# Patient Record
Sex: MALE | Race: WHITE | Employment: STUDENT | URBAN - METROPOLITAN AREA
[De-identification: names, ages, dates, MRNs, and addresses within clinical notes are randomized per-mention and may not be internally consistent; named-entity substitution may affect disease eponyms.]

---
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
## 2019-01-16 ENCOUNTER — OFFICE VISIT (OUTPATIENT)
Dept: OTHER | Facility: OUTPATIENT CENTER | Age: 19
End: 2019-01-16
Payer: COMMERCIAL

## 2019-01-16 DIAGNOSIS — F64.2 GENDER DYSPHORIA IN PEDIATRIC PATIENT: ICD-10-CM

## 2019-01-16 NOTE — PROGRESS NOTES
"[facility] -  Case Progress Note    1/16/19  Client Name: Remy Stephenson she/her/hers  YOB: 2000  MRN:  [id number]  Treating Provider: Cornelia Avendano PsyD  Type of Session: individual  Present in Session: client  Number of Minutes:  55    Current Symptoms/Status:  Client describes incongruence between her birth assigned sex and her experienced gender. Client describes self as a \"gender fluid female\"; noting distress in reaction to being treated as and referred to as male. Client describes body dysphoria regarding some primary and secondary sex characteristics. Client describes preference for flexibility in her gender expression.    Client's parents are legal guardians. Client is a neurodiverse individual (ASD, ADHD).    Progress Toward Treatment Goals:   Attending second session with provider.    Intervention and Response:  Client was administered the Body Image Scale-Gender Spectrum, [place] Gender Dysphoria Scale-Gender Spectrum, and the Gender Queer Identity Scale in the second session. Client s responses on the Body Image Scale-Gender Spectrum indicated that she is most distressed by her facial/body hair, weight, penis, and testicles. Client expressed dissatisfaction with her scrotum, lack of breasts, lack of vagina, lack of clitoris, arms, weight, waist, voice, figure, and lack of ovaries/uterus; noting desire for feminine secondary sex characteristics. However, it is unclear as to what degree client could comprehend the concepts discussed. For example, when responding to what she would change about the absence of ovaries/uterus, client responded \"bottom surgery.\" Also, when asked about what bothers her about her muscles with responded with \"my penis.\" Therefore, assessment should continue to explore the development of client's gender dysphoria and the acquisition of her gender transition narrative.     On the [place] Gender Dysphoria Scale-Gender Spectrum, the " "client's responses indicated complete agreement with  every time someone treats me like my assigned sex I feel hurt,   it feels good to live as my affirmed gender,   it is uncomfortable to be sexual in my assigned sex,   a life in my affirmed gender is more attractive for me than a life in my assigned sex,\"  I feel unhappy behaving in my assigned sex,   I always want to be treated as my affirmed gender,   I wish I had been born in my affirmed gender,   I feel unhappy because I have the physical characteristics of my assigned sex,  the bodily functions of my assigned sex are distressing,   my life would be meaningless if I would have to live in my assigned sex,   I feel hopeless if I have to stay in my assigned sex,   I feel unhappy when someone misgenders me,  and  I hate my assigned sex,  and  feel uncomfortable behaving like my assigned sex.       Client struggled to provide responses, often responding with \"I don't know.\" Client required long pauses prior to responding and required repeated reframing from provider in order to understand questions. Client struggled with expressive and respective language processing.   Assignment:  none    Interactive Complexity:  1. The need to manage maladaptive communication (difficulties with receptive and expressive language processing) among participants that complicates delivery of care.    Diagnosis:  302.6 Gender Dysphoria, unspecified  ASD, ADHD, Bipolar II, anxiety (by history)    Plan / Need for Future Services:  Return for therapy in 2 weeks.      Cornelia Avendano PsyD  "

## 2019-01-24 ENCOUNTER — OFFICE VISIT (OUTPATIENT)
Dept: FAMILY MEDICINE | Facility: CLINIC | Age: 19
End: 2019-01-24
Payer: COMMERCIAL

## 2019-01-24 VITALS
DIASTOLIC BLOOD PRESSURE: 75 MMHG | BODY MASS INDEX: 28.58 KG/M2 | OXYGEN SATURATION: 98 % | SYSTOLIC BLOOD PRESSURE: 118 MMHG | TEMPERATURE: 97.6 F | HEIGHT: 72 IN | WEIGHT: 211 LBS | HEART RATE: 79 BPM

## 2019-01-24 DIAGNOSIS — Z00.00 ANNUAL PHYSICAL EXAM: Primary | ICD-10-CM

## 2019-01-24 DIAGNOSIS — R50.9 FEVER, UNSPECIFIED FEVER CAUSE: ICD-10-CM

## 2019-01-24 ASSESSMENT — PATIENT HEALTH QUESTIONNAIRE - PHQ9: 5. POOR APPETITE OR OVEREATING: MORE THAN HALF THE DAYS

## 2019-01-24 ASSESSMENT — ANXIETY QUESTIONNAIRES
1. FEELING NERVOUS, ANXIOUS, OR ON EDGE: NEARLY EVERY DAY
3. WORRYING TOO MUCH ABOUT DIFFERENT THINGS: MORE THAN HALF THE DAYS
2. NOT BEING ABLE TO STOP OR CONTROL WORRYING: MORE THAN HALF THE DAYS
5. BEING SO RESTLESS THAT IT IS HARD TO SIT STILL: MORE THAN HALF THE DAYS
6. BECOMING EASILY ANNOYED OR IRRITABLE: MORE THAN HALF THE DAYS
IF YOU CHECKED OFF ANY PROBLEMS ON THIS QUESTIONNAIRE, HOW DIFFICULT HAVE THESE PROBLEMS MADE IT FOR YOU TO DO YOUR WORK, TAKE CARE OF THINGS AT HOME, OR GET ALONG WITH OTHER PEOPLE: VERY DIFFICULT
7. FEELING AFRAID AS IF SOMETHING AWFUL MIGHT HAPPEN: MORE THAN HALF THE DAYS
GAD7 TOTAL SCORE: 15

## 2019-01-24 ASSESSMENT — MIFFLIN-ST. JEOR: SCORE: 2005.22

## 2019-01-24 NOTE — PROGRESS NOTES
"Remy \"Seema\" Jewel is here for a general check up, accompanied by parents.  She is not fasting. She is up to date on eye exams and dental visits, but reports she may need new glasses. Wears seat belt.--yes Wears bike helmet--yes.  Concerns today:    HCM  Jacques \"Yeison" is a 17 yo who identifies as female, here for a check up. They prefer she/her pronouns. She is up to date on immunizations. She eats a fairly healthy diet. She typically has 3 meals per day. She believes she is intolerant of gluten and dairy. She had a lipid panel on 6/8/17, normal.She also had a basic metabolic panel, which was normal aside from a slightly elevated calcium level (10.5) and low anion gap (6). Mom reports she may have had kidney stones in the past.    Camp forms  Seema has brought forms to fill out for camp. She has gone to this camp several times in the past. 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 She will be living in staff housing with 1-2 other roommates. The camp starts in June, and lasts for 8 weeks.    Depression/anxiety  She was admitted to the hospital in May 2018 for her depression. She reports today that her mental health is doing better since then. Her primary concern is her anxiety. [facility] She sees her childhood psychiatrist, Dr. Rose, as needed. She will try to transition to an adult psychiatrist around age 21-22.    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 Mom has a list of potential drug interactions. This diagnosis was made by her psychiatrist.    Cough/sore throat  Seema reports a cough and mild sore throat today.    Fever  Seema has had a " history of cyclic fevers, up to 101. This occurs 1-2 times a year. She first noted this in February of 2018. Fever was associated with a mild sore throat and headache. No fatigue. She had CBC, hepatic panel, EBV antibody, and CMV antibody, all of which were normal. Her parents report that she experiences similar episodes 1-2 times/year.    She recently developed a fever for an unknown etiology that lasted for about 1 week. She did not feel fatigued, but her appetite was slightly decreased.  She took 4 days off of work, then returned to her normal activities. She was taking Tylenol to regulate her temperature. Family would like to see ID doctor to determine cause. She feels well today.     GERD  She experiences intermittent heart burn, which she treats with ranitidine. She occasionally has trouble swallowing pills, but no trouble with food. This works well to resolve the symptoms. She also reports intermittent diarrhea and constipation.      Health Maintenance   Topic Date Due     HIV SCREEN (SYSTEM ASSIGNED)  02/15/2017     INFLUENZA VACCINE (1) 09/01/2018     PHQ-2 Q1 YR  04/19/2019     DTAP/TDAP/TD IMMUNIZATION (7 - Td) 04/12/2021     ZOSTER IMMUNIZATION (1 of 2) 02/15/2049     IPV IMMUNIZATION  Completed     HIB IMMUNIZATION  Completed     HPV IMMUNIZATION  Completed     MENINGITIS IMMUNIZATION  Completed     VARICELLA IMMUNIZATION  Completed         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      Current Outpatient Medications   Medication Sig Dispense Refill     metFORMIN (GLUCOPHAGE) 500 MG tablet Take 500 mg by mouth 2 times daily (with meals)       acetylcysteine (N-ACETYL CYSTEINE) 600 MG CAPS capsule Take one po bid 60 capsule 3     clonazePAM (KLONOPIN) 1 MG tablet 0.5 mg 2 times daily as needed Take 0.5 -1 tablet in the morning, and may repeat one additional dose q day prn 90 tablet 1     guanFACINE HCl (INTUNIV) 3 MG TB24 24 hr tablet 4 mg Take one daily for ADHD 30 tablet 1     hydrOXYzine (ATARAX) 25 MG tablet Take one po q 8 hr for respiratory symptoms prn (Patient not taking: Reported on 1/24/2019) 30 tablet 3     lithium (ESKALITH) 450 MG CR tablet Take a single 450mg tablet po q hs along with a single 300mg tablet at hs 90 tablet 3     lithium (ESKALITH/LITHOBID) 300 MG CR tablet 750 mg 2 times daily Take 2 po q am and one po q hs along with 450mg dose 90 tablet 1     loratadine (CLARITIN) 10 MG capsule Take 10 mg by mouth daily 30 capsule 3     lurasidone (LATUDA) 80 MG TABS tablet Take 160 mg by mouth       melatonin 3 MG tablet Take 6-9 mg by mouth       mirtazapine (REMERON) 7.5 MG TABS tablet Take 7.5mg dose  "at bedtime (Patient not taking: Reported on 6/11/2018) 30 tablet 3     polyethylene glycol (MIRALAX/GLYCOLAX) powder None Entered       QUEtiapine (SEROQUEL) 400 MG tablet Take 1 tablet (400 mg) by mouth At Bedtime Take 2 (200mg) po q hs 30 tablet 3     QUEtiapine (SEROQUEL) 50 MG tablet 50 mg as needed Take 1-2 po TID prn 120 tablet 1     sodium chloride (OCEAN) 0.65 % nasal spray Spray 2 sprays into both nostrils daily as needed for congestion 30 mL 0     Allergies   Allergen Reactions     Gluten Meal Other (See Comments)     No Known Allergies          ROS  CONSTITUTIONAL:NEGATIVE for chills or change in weight. POSITIVE for intermittent fever, unknown cause.  INTEGUMENTARY/SKIN: NEGATIVE for worrisome rashes, moles or lesions, mild acne  EYES: NEGATIVE for vision changes or irritation, glasses  ENT/MOUTH: NEGATIVE for ear or mouth problems. POSITIVE for mild sore throat.  RESP:NEGATIVE for significant cough or SOB.  CV: NEGATIVE for chest pain, palpitations, FISCHER, orthopnea, PND  or peripheral edema  GI: NEGATIVE for nausea, abdominal pain, heartburn, or change in bowel habits. POSITIVE for heartburn and intermittent bowel changes.  :NEGATIVE for frequency, dysuria, or hematuria  MUSCULOSKELETAL:NEGATIVE for significant arthralgias or myalgia. Some muscle pain after exercise.  NEURO: NEGATIVE for weakness, dizziness or paresthesias  ENDOCRINE: NEGATIVE for polyuria/dipsia,  temperature intolerance, skin/hair changes  HEME/ALLERGY/IMMUNE: NEGATIVE for bleeding problems  PSYCHIATRIC: NEGATIVE for changes in mood or affect, followed by psychiatrist and counselor    PHQ9 score = 16  LOLLY 7 score = 15    EXAM  /75   Pulse 79   Temp 97.6  F (36.4  C) (Oral)   Ht 1.821 m (5' 11.69\")   Wt 95.7 kg (211 lb)   SpO2 98%   BMI 28.86 kg/m    GENERAL APPEARANCE: Alert, pleasant, NAD, overweight  EYES: PERRL, EOMI, conjunctiva clear  HENT: TM normal bilaterally. Nose and mouth without lesions  NECK: no adenopathy, " thyroid normal to palpation  RESP: lungs clear to auscultation bilaterally  CV: regular rate and rhythm, normal S1 S2, no murmur, no carotid bruits  ABDOMEN: soft, without HSM or masses. Bowel sounds normal. Mild tenderness at midepigastrium  MS: extremities normal- no gross deformities noted, no tender, hot or swollen joints.  SKIN: no suspicious lesions or rashes, mild facial acne  NEURO: Normal strength and tone, sensory exam grossly normal, DTR normoreflexive in upper and lower extremities  PSYCH: mentation appears normal. and affect normal/bright.  EXT: no peripheral edema, pedal pulses palpable    Assessment:  (Z00.00) Annual physical exam  (primary encounter diagnosis)  Comment: 17 yo patient in good health, vaccines are up to date  Plan: Anticipatory guidance given today regarding diet, exercise and calcium intake, safety. I completed form for participation in camp.     (R50.9) Fever, unspecified fever cause  Comment: historically has episodic fevers of unknown origin lasting > 1 week  Plan: INFECTIOUS DISEASE REFERRAL        Refer to ID for evaluation    Lana Faustin MD  Internal Medicine    I, Sophie Riggins, am serving as a scribe to document services personally performed by Dr. Lana Faustin, based on data collection and the provider's statements to me. Dr. Faustin has reviewed, edited, and approved the above note.

## 2019-01-24 NOTE — NURSING NOTE
"18 year old  Chief Complaint   Patient presents with     Physical     no concerns per mom       Blood pressure 118/75, pulse 79, temperature 97.6  F (36.4  C), temperature source Oral, height 1.821 m (5' 11.69\"), weight 95.7 kg (211 lb), SpO2 98 %. Body mass index is 28.86 kg/m .  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     BP Readings from Last 3 Encounters:   01/24/19 118/75 (30 %/ 63 %)*   06/11/18 104/70 (3 %/ 37 %)*   04/19/18 126/69 (62 %/ 35 %)*     *BP percentiles are based on the August 2017 AAP Clinical Practice Guideline for boys         Current Outpatient Medications   Medication     metFORMIN (GLUCOPHAGE) 500 MG tablet     acetylcysteine (N-ACETYL CYSTEINE) 600 MG CAPS capsule     clonazePAM (KLONOPIN) 1 MG tablet     guanFACINE HCl (INTUNIV) 3 MG TB24 24 hr tablet     hydrOXYzine (ATARAX) 25 MG tablet     lithium (ESKALITH) 450 MG CR tablet     lithium (ESKALITH/LITHOBID) 300 MG CR tablet     loratadine (CLARITIN) 10 MG capsule     lurasidone (LATUDA) 80 MG TABS tablet     melatonin 3 MG tablet     mirtazapine (REMERON) 7.5 MG TABS tablet     polyethylene glycol " (MIRALAX/GLYCOLAX) powder     QUEtiapine (SEROQUEL) 400 MG tablet     QUEtiapine (SEROQUEL) 50 MG tablet     sodium chloride (OCEAN) 0.65 % nasal spray     No current facility-administered medications for this visit.        Social History     Tobacco Use     Smoking status: Never Smoker     Smokeless tobacco: Never Used   Substance Use Topics     Alcohol use: No     Drug use: No       Health Maintenance Due   Topic Date Due     HIV SCREEN (SYSTEM ASSIGNED)  02/15/2017     INFLUENZA VACCINE (1) 09/01/2018       No results found for: PAP      January 24, 2019 1:08 PM

## 2019-01-25 ENCOUNTER — OFFICE VISIT (OUTPATIENT)
Dept: OTHER | Facility: OUTPATIENT CENTER | Age: 19
End: 2019-01-25
Payer: COMMERCIAL

## 2019-01-25 DIAGNOSIS — F64.2 GENDER DYSPHORIA IN PEDIATRIC PATIENT: Primary | ICD-10-CM

## 2019-01-25 NOTE — PROGRESS NOTES
"[facility] -  Case Progress Note    1/25/19  Client Name: Remy Stephenson she/her/hers  YOB: 2000  MRN:  [id number]  Treating Provider: Cornelia Avendano PsyD  Type of Session: individual  Present in Session: client  Number of Minutes:  55    Current Symptoms/Status:  Client describes incongruence between her birth assigned sex and her experienced gender. Client describes self as a \"gender fluid female\"; noting distress in reaction to being treated as and referred to as male. Client describes body dysphoria regarding some primary and secondary sex characteristics. Client describes preference for flexibility in her gender expression.    Client's parents are legal guardians. Client is a neurodiverse individual (ASD, ADHD).    Progress Toward Treatment Goals:   Attending second session with provider.    Intervention and Response:  Supportive and expressive approach to exploring and identifying goals for treatment and treatment planning, as well as discussing diagnosis and subsequent recommendations. See scanned and attached treatment plan. Client was actively engaged in the treatment planning process as noted in their identified goals for treatment. Took adequate time providing explanations in ways in which client could best process. Client asked many clarifying questions in order to fully understand recommendations. Client reported agreement with diagnosis and subsequent recommendations for treatment.    Planned to meet with parents separately to discuss diagnostic impressions and recommendations at next session.    Assignment:  none    Interactive Complexity:  1. The need to manage maladaptive communication (difficulties with receptive and expressive language processing) among participants that complicates delivery of care.    Diagnosis:  302.6 Gender Dysphoria, unspecified  ASD, ADHD, Bipolar I, anxiety (by history)    Plan / Need for Future Services:  Return for therapy " in 2 weeks.      Cornelia Avendano PsyD

## 2019-01-29 ENCOUNTER — TELEPHONE (OUTPATIENT)
Dept: FAMILY MEDICINE | Facility: CLINIC | Age: 19
End: 2019-01-29

## 2019-01-29 NOTE — TELEPHONE ENCOUNTER
Father states patient gets fevers once in a while that have no known cause. The patient currently is asymptomatic. He wants to know what to do if she should get a fever. The patient's temperature ranges in the 99.5-102 F using forehead thermometer. Advised father to get an oral thermometer, and can ask pharmacist which brand to purchase. If temperature is elevated >100.5 and/or patient has pain, OK to give acetaminophen, and the intent of the fever reducer is to bring temp down 1-2 degrees.  Increase fluids during illness. All questions answered, and father verbalized understanding, and agrees with the plan.   Vivien Silva RN  01/29/19  12:06 PM

## 2019-01-29 NOTE — TELEPHONE ENCOUNTER
----- Message from Drake Chong sent at 1/29/2019 11:20 AM CST -----  Regarding: call [facility] requested  Contact: [phone]  Msg for :    Pt father looking for guidance on what to do while waiting for infectious disease appt.    VOICEMAIL OK    Thanks,      Drake [facility]

## 2019-02-12 ENCOUNTER — OFFICE VISIT (OUTPATIENT)
Dept: OTHER | Facility: OUTPATIENT CENTER | Age: 19
End: 2019-02-12
Payer: COMMERCIAL

## 2019-02-12 DIAGNOSIS — F84.0 AUTISM SPECTRUM DISORDER: ICD-10-CM

## 2019-02-12 DIAGNOSIS — F64.0 GENDER DYSPHORIA IN ADOLESCENT AND ADULT: Primary | ICD-10-CM

## 2019-02-12 NOTE — PROGRESS NOTES
"[facility] -  Case Progress Note    2/12/19  Client Name: Remy Stephenson she/her/hers  YOB: 2000  MRN:  [id number]  Treating Provider: Cornelia Avendano PsyD  Type of Session: family  Present in Session: client, client's father, client's mother  Number of Minutes:  55    Current Symptoms/Status:  Client describes incongruence between her birth assigned sex and her experienced gender. Client describes self as a \"gender fluid female\"; noting distress in reaction to being treated as and referred to as male. Client describes body dysphoria regarding some primary and secondary sex characteristics. Client describes preference for flexibility in her gender expression.    Client's parents are legal guardians. Client is a neurodiverse individual (ASD, ADHD).    Progress Toward Treatment Goals:   Attending family session with parents and provider.    Intervention and Response:  Supportive and expressive approach to exploring and identifying goals for treatment and treatment planning, as well as discussing diagnosis and subsequent recommendations. See scanned and attached treatment plan. Client and parents were actively engaged in the treatment planning process as noted in their identified goals for treatment. Took adequate time providing explanations in ways in which client could best process. Parents asked clarifying questions in order to fully understand recommendations and best treatment plan. Client and parents reported agreement with diagnosis and subsequent recommendations for treatment.      Assignment:  none    Interactive Complexity:  1. The need to manage maladaptive communication (difficulties with receptive and expressive language processing) among participants that complicates delivery of care.    Diagnosis:  302.85 Gender Dysphoria  ASD, ADHD, Bipolar I, anxiety (by history)    Plan / Need for Future Services:  Return for therapy in 2 weeks.      Cornelia BECERRA" Juan Alberto, SegundoyD

## 2019-02-20 ENCOUNTER — OFFICE VISIT (OUTPATIENT)
Dept: INFECTIOUS DISEASES | Facility: CLINIC | Age: 19
End: 2019-02-20
Attending: INTERNAL MEDICINE
Payer: COMMERCIAL

## 2019-02-20 ENCOUNTER — APPOINTMENT (OUTPATIENT)
Dept: LAB | Facility: CLINIC | Age: 19
End: 2019-02-20
Payer: COMMERCIAL

## 2019-02-20 VITALS
DIASTOLIC BLOOD PRESSURE: 84 MMHG | BODY MASS INDEX: 27.11 KG/M2 | OXYGEN SATURATION: 96 % | HEIGHT: 74 IN | TEMPERATURE: 97.6 F | WEIGHT: 211.2 LBS | SYSTOLIC BLOOD PRESSURE: 125 MMHG | HEART RATE: 78 BPM

## 2019-02-20 DIAGNOSIS — A68.9 RECURRENT FEVER: Primary | ICD-10-CM

## 2019-02-20 DIAGNOSIS — R50.9 FEVER, UNSPECIFIED FEVER CAUSE: ICD-10-CM

## 2019-02-20 LAB
ALBUMIN SERPL-MCNC: 4.3 G/DL (ref 3.4–5)
ALBUMIN UR-MCNC: NEGATIVE MG/DL
ALP SERPL-CCNC: 139 U/L (ref 40–150)
ALT SERPL W P-5'-P-CCNC: 29 U/L (ref 0–70)
ANION GAP SERPL CALCULATED.3IONS-SCNC: 4 MMOL/L (ref 3–14)
APPEARANCE UR: ABNORMAL
AST SERPL W P-5'-P-CCNC: 26 U/L (ref 0–45)
BASOPHILS # BLD AUTO: 0 10E9/L (ref 0–0.2)
BASOPHILS NFR BLD AUTO: 0.2 %
BILIRUB SERPL-MCNC: 0.4 MG/DL (ref 0.2–1.3)
BILIRUB UR QL STRIP: NEGATIVE
BUN SERPL-MCNC: 12 MG/DL (ref 7–30)
CALCIUM SERPL-MCNC: 9.9 MG/DL (ref 8.5–10.1)
CHLORIDE SERPL-SCNC: 105 MMOL/L (ref 94–109)
CO2 SERPL-SCNC: 27 MMOL/L (ref 20–32)
COLOR UR AUTO: YELLOW
CREAT SERPL-MCNC: 0.94 MG/DL (ref 0.66–1.25)
CRP SERPL-MCNC: 16 MG/L (ref 0–8)
DIFFERENTIAL METHOD BLD: NORMAL
EOSINOPHIL # BLD AUTO: 0.2 10E9/L (ref 0–0.7)
EOSINOPHIL NFR BLD AUTO: 2.3 %
ERYTHROCYTE [DISTWIDTH] IN BLOOD BY AUTOMATED COUNT: 12.6 % (ref 10–15)
ERYTHROCYTE [SEDIMENTATION RATE] IN BLOOD BY WESTERGREN METHOD: 10 MM/H (ref 0–15)
GFR SERPL CREATININE-BSD FRML MDRD: >90 ML/MIN/{1.73_M2}
GLUCOSE SERPL-MCNC: 84 MG/DL (ref 70–99)
GLUCOSE UR STRIP-MCNC: NEGATIVE MG/DL
HCT VFR BLD AUTO: 42.6 % (ref 40–53)
HGB BLD-MCNC: 13.9 G/DL (ref 13.3–17.7)
HGB UR QL STRIP: NEGATIVE
IMM GRANULOCYTES # BLD: 0.1 10E9/L (ref 0–0.4)
IMM GRANULOCYTES NFR BLD: 0.5 %
KETONES UR STRIP-MCNC: NEGATIVE MG/DL
LEUKOCYTE ESTERASE UR QL STRIP: NEGATIVE
LYMPHOCYTES # BLD AUTO: 1.6 10E9/L (ref 0.8–5.3)
LYMPHOCYTES NFR BLD AUTO: 16.5 %
MCH RBC QN AUTO: 27.7 PG (ref 26.5–33)
MCHC RBC AUTO-ENTMCNC: 32.6 G/DL (ref 31.5–36.5)
MCV RBC AUTO: 85 FL (ref 78–100)
MONOCYTES # BLD AUTO: 0.5 10E9/L (ref 0–1.3)
MONOCYTES NFR BLD AUTO: 5 %
MUCOUS THREADS #/AREA URNS LPF: PRESENT /LPF
NEUTROPHILS # BLD AUTO: 7.1 10E9/L (ref 1.6–8.3)
NEUTROPHILS NFR BLD AUTO: 75.5 %
NITRATE UR QL: NEGATIVE
NRBC # BLD AUTO: 0 10*3/UL
NRBC BLD AUTO-RTO: 0 /100
PH UR STRIP: 6 PH (ref 5–7)
PLATELET # BLD AUTO: 356 10E9/L (ref 150–450)
POTASSIUM SERPL-SCNC: 4 MMOL/L (ref 3.4–5.3)
PROT SERPL-MCNC: 8.1 G/DL (ref 6.8–8.8)
RBC # BLD AUTO: 5.02 10E12/L (ref 4.4–5.9)
RBC #/AREA URNS AUTO: 0 /HPF (ref 0–2)
SODIUM SERPL-SCNC: 136 MMOL/L (ref 133–144)
SOURCE: ABNORMAL
SP GR UR STRIP: 1.01 (ref 1–1.03)
TRANS CELLS #/AREA URNS HPF: <1 /HPF
UROBILINOGEN UR STRIP-MCNC: 0 MG/DL (ref 0–2)
WBC # BLD AUTO: 9.5 10E9/L (ref 4–11)
WBC #/AREA URNS AUTO: <1 /HPF (ref 0–5)

## 2019-02-20 PROCEDURE — 81001 URINALYSIS AUTO W/SCOPE: CPT | Performed by: STUDENT IN AN ORGANIZED HEALTH CARE EDUCATION/TRAINING PROGRAM

## 2019-02-20 PROCEDURE — 36415 COLL VENOUS BLD VENIPUNCTURE: CPT | Performed by: STUDENT IN AN ORGANIZED HEALTH CARE EDUCATION/TRAINING PROGRAM

## 2019-02-20 PROCEDURE — G0463 HOSPITAL OUTPT CLINIC VISIT: HCPCS | Mod: ZF

## 2019-02-20 PROCEDURE — 85025 COMPLETE CBC W/AUTO DIFF WBC: CPT | Performed by: STUDENT IN AN ORGANIZED HEALTH CARE EDUCATION/TRAINING PROGRAM

## 2019-02-20 PROCEDURE — 86480 TB TEST CELL IMMUN MEASURE: CPT | Performed by: STUDENT IN AN ORGANIZED HEALTH CARE EDUCATION/TRAINING PROGRAM

## 2019-02-20 PROCEDURE — 86308 HETEROPHILE ANTIBODY SCREEN: CPT | Performed by: STUDENT IN AN ORGANIZED HEALTH CARE EDUCATION/TRAINING PROGRAM

## 2019-02-20 PROCEDURE — 80053 COMPREHEN METABOLIC PANEL: CPT | Performed by: STUDENT IN AN ORGANIZED HEALTH CARE EDUCATION/TRAINING PROGRAM

## 2019-02-20 PROCEDURE — 87040 BLOOD CULTURE FOR BACTERIA: CPT | Performed by: STUDENT IN AN ORGANIZED HEALTH CARE EDUCATION/TRAINING PROGRAM

## 2019-02-20 PROCEDURE — 86038 ANTINUCLEAR ANTIBODIES: CPT | Performed by: STUDENT IN AN ORGANIZED HEALTH CARE EDUCATION/TRAINING PROGRAM

## 2019-02-20 PROCEDURE — 86618 LYME DISEASE ANTIBODY: CPT | Performed by: STUDENT IN AN ORGANIZED HEALTH CARE EDUCATION/TRAINING PROGRAM

## 2019-02-20 PROCEDURE — 87491 CHLMYD TRACH DNA AMP PROBE: CPT | Performed by: STUDENT IN AN ORGANIZED HEALTH CARE EDUCATION/TRAINING PROGRAM

## 2019-02-20 PROCEDURE — 86140 C-REACTIVE PROTEIN: CPT | Performed by: STUDENT IN AN ORGANIZED HEALTH CARE EDUCATION/TRAINING PROGRAM

## 2019-02-20 PROCEDURE — 0064U ANTB TP TOTAL&RPR IA QUAL: CPT | Performed by: STUDENT IN AN ORGANIZED HEALTH CARE EDUCATION/TRAINING PROGRAM

## 2019-02-20 PROCEDURE — 87591 N.GONORRHOEAE DNA AMP PROB: CPT | Performed by: STUDENT IN AN ORGANIZED HEALTH CARE EDUCATION/TRAINING PROGRAM

## 2019-02-20 PROCEDURE — 87389 HIV-1 AG W/HIV-1&-2 AB AG IA: CPT | Performed by: STUDENT IN AN ORGANIZED HEALTH CARE EDUCATION/TRAINING PROGRAM

## 2019-02-20 PROCEDURE — 85652 RBC SED RATE AUTOMATED: CPT | Performed by: STUDENT IN AN ORGANIZED HEALTH CARE EDUCATION/TRAINING PROGRAM

## 2019-02-20 ASSESSMENT — PAIN SCALES - GENERAL: PAINLEVEL: MODERATE PAIN (5)

## 2019-02-20 ASSESSMENT — MIFFLIN-ST. JEOR: SCORE: 2029.81

## 2019-02-20 NOTE — PROGRESS NOTES
[facility]  Infectious Disease Clinic Note:  New Patient     Patient:  Remy Holloway, Date of birth 2000, Medical record number [id number]  Date of Visit:  02/20/2019  Consult requested by   for evaluation of .         Assessment and Recommendations:   Recommendations:    Assessment:               History of the Infectious Disease lllness:       Review of Systems:  CONSTITUTIONAL:  No fevers or chills. No night sweats.  EYES: negative for icterus or acute vision changes.   ENT:  negative for hearing loss, tinnitus or sore throat  RESPIRATORY:  negative for cough, sputum, dyspnea  CARDIOVASCULAR:  negative for chest pain, heart palpitations  GASTROINTESTINAL:  negative for nausea, vomiting, diarrhea or constipation  GENITOURINARY:  negative for dysuria or hematuria.  HEME:  No easy bruising or bleeding  INTEGUMENT:  negative for rash or pruritus  NEURO:  Negative for headache or tremor.    Past Medical History:   Diagnosis Date     Amblyopia, unspecified      Esotropia, unspecified      Lack of normal physiological development, unspecified     Normal chromosomes, nl MRI, neg fragile X     Redundant prepuce and phimosis     Penile coronal adhesions-repaired     Umbilical hernia without mention of obstruction or gangrene     repaired     Unspecified otitis media     Recurrent       Past Surgical History:   Procedure Laterality Date     DENTAL SURGERY      wisdom teeth removed     HC CREATE EARDRUM OPENING,GEN ANESTH  8/2000    P.E. Tubes - removed 2002     HC REPAIR, INCOMPLETE CIRCUMCISION  2000    Recircumcised     HERNIA REPAIR, UMBILICAL  2000    repair umbilical hernia       Family History   Problem Relation Age of Onset     Migraines Mother        Social History     Social History Narrative    Environmental History    Child is around people that smoke: No     Child's home has well water: No    Child's home has filtered water:No    Child has pets in the home: Yes    Child's  home/apartment was built before 1950:Yes    Child attends : Yes    Child has exposure to sibling/playmate with lead poisoning: No    Child has exposure to someone with tuberculosis: No    Child home have guns/firearms without trigger locks: No    Child home have guns/firearms with trigger locks: No    There are concerns about the child's exposure to violence in the home: No        Family Information:    Parent #1    Name: SARA Mcdonough, 12-2-63  Education:  Post grad Masters  Occupation:     Parent #2    Name: AAMIR Verma, 1-16-64  Education: Post Grad  Occupation:         Relationship Status of Parent(s):     Who does the child live with? mother, father and brother(s)    What language(s) is/are spoken at home? English     Nuria Reyes RN                             Social History     Tobacco Use     Smoking status: Never Smoker     Smokeless tobacco: Never Used   Substance Use Topics     Alcohol use: No     Drug use: No       Immunization History   Administered Date(s) Administered     Comvax (HIB/HepB) 2000, 2000, 02/16/2000     DTAP (<7y) 2000, 2000, 1999, 06/09/2000, 03/02/2004     HEPA 02/14/2007, 12/14/2007     HPV 07/24/2012, 11/20/2013, 08/13/2014     Influenza (H1N1) 01/24/2010     Influenza (IIV3) PF 02/14/2007     Influenza Intranasal Vaccine 11/15/2007, 10/07/2008     Influenza Intranasal Vaccine 4 valent 11/20/2013     Influenza Vaccine IM 3yrs+ 4 Valent IIV4 10/04/2018     MMR 06/09/2000, 03/08/2001, 03/02/2004     Meningococcal (Menactra ) 04/12/2011, 04/19/2018     OPV, trivalent, live 1999     Poliovirus, inactivated (IPV) 2000, 1999, 03/02/2004     TDAP Vaccine (Boostrix) 04/12/2011     Varicella 02/16/2000, 02/14/2007     Yellow Fever 01/19/2010       Patient Active Problem List   Diagnosis     Constipation     Attention deficit hyperactivity disorder (ADHD)     GENERALIZED ANXIETY and depression      LD- nonverbal, dysgraphia and dyspraxia     Dysuria/ likely passed a kidney stone     Exercise-induced asthma     Autism spectrum disorder     Gender dysphoria in adolescent and adult     Folliculitis     Gastroesophageal reflux disease without esophagitis     Bipolar disorder in partial remission (H)     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)     Gluten-sensitive enteropathy     Gluten intolerance     Seasonal allergic rhinitis     Mild intermittent asthma without complication     Gender dysphoria, unspecified       Outpatient Medications Marked as Taking for the 2/20/19 encounter (Office Visit) with Timothy Taylor MD   Medication Sig     acetylcysteine (N-ACETYL CYSTEINE) 600 MG CAPS capsule Take one po bid     clonazePAM (KLONOPIN) 1 MG tablet 0.5 mg 2 times daily as needed Take 0.5 -1 tablet in the morning, and may repeat one additional dose q day prn     guanFACINE HCl (INTUNIV) 3 MG TB24 24 hr tablet 4 mg Take one daily for ADHD     lithium (ESKALITH) 450 MG CR tablet Take a single 450mg tablet po q hs along with a single 300mg tablet at hs     lithium (ESKALITH/LITHOBID) 300 MG CR tablet 750 mg 2 times daily Take 2 po q am and one po q hs along with 450mg dose     loratadine (CLARITIN) 10 MG capsule Take 10 mg by mouth daily     lurasidone (LATUDA) 80 MG TABS tablet Take 160 mg by mouth     melatonin 3 MG tablet Take 6-9 mg by mouth     metFORMIN (GLUCOPHAGE) 500 MG tablet Take 500 mg by mouth 2 times daily (with meals)     polyethylene glycol (MIRALAX/GLYCOLAX) powder None Entered     QUEtiapine (SEROQUEL) 400 MG tablet Take 1 tablet (400 mg) by mouth At Bedtime Take 2 (200mg) po q hs     QUEtiapine (SEROQUEL) 50 MG tablet 50 mg as needed Take 1-2 po TID prn       Allergies   Allergen Reactions     Gluten Meal Other (See Comments)     No Known Allergies           Physical Exam:   Vitals were reviewed.  All vitals stable  /84   Pulse 78   Temp 97.6  F (36.4  C) (Oral)    "Ht 1.867 m (6' 1.5\")   Wt 95.8 kg (211 lb 3.2 oz)   SpO2 96%   BMI 27.49 kg/m    Wt Readings from Last 4 Encounters:   02/20/19 95.8 kg (211 lb 3.2 oz)   01/24/19 95.7 kg (211 lb) (95 %)*   06/11/18 89.4 kg (197 lb 1.3 oz) (92 %)*   04/19/18 88 kg (194 lb) (91 %)*     * Growth percentiles are based on CDC (Boys, 2-20 Years) data.     Exam:  GENERAL: well-developed, well-nourished, alert, oriented, in no acute distress.  HEENT: Head is normocephalic, atraumatic.   EYES: Eyes have anicteric sclerae.    ENT: Oropharynx is moist without exudates or ulcers. Bilateral ears ceruminous R>L  NECK: Supple.  LUNGS: Clear to auscultation.  CARDIOVASCULAR: Regular rate and rhythm with no murmurs, gallops or rubs.  ABDOMEN: Normal bowel sounds, soft, nontender.  SKIN: No acute rashes. No nail changes.   NEUROLOGIC: Grossly nonfocal.         Laboratory Data:     Inflammatory Markers    Recent Labs   Lab Test 02/20/19  1624   SED 10   CRP 16.0*     Metabolic Studies    Recent Labs   Lab Test 02/20/19  1624 06/11/15 04/30/15  0807 01/22/15  0813     --  138 139   POTASSIUM 4.0 4.1 4.0 4.3   CHLORIDE 105  --  104 108   CO2 27  --  26 26   ANIONGAP 4  --  8 5   BUN 12  --  14 14   CR 0.94 0.93 0.84 0.93   GFRESTIMATED >90  --  >90  Non  GFR Calc   GFR not calculated, patient <16 years old.  Non  GFR Calc     GLC 84 76 90 90   GAIL 9.9  --  10.0 9.2     Hepatic Studies    Recent Labs   Lab Test 02/20/19  1624 02/05/18  1725 04/30/15  0807   BILITOTAL 0.4 0.5 0.7   DBIL  --  0.1  --    ALKPHOS 139 110 130   PROTTOTAL 8.1 7.7 8.0   ALBUMIN 4.3 4.3 4.1   AST 26 24 22   ALT 29 31 36     Pancreatitis testing    Recent Labs   Lab Test 04/30/15  0807 01/22/15  0813   TRIG 52 53     Lipid testing    Recent Labs   Lab Test 04/30/15  0807 01/22/15  0813   CHOL 147 129   HDL 70 78   LDL 67 40   TRIG 52 53   CHOLHDLRATIO 2.1 1.7     Hematology Studies     Recent Labs   Lab Test 02/20/19  1624 02/05/18  1725 " 04/30/15  0807 01/22/15  0813   WBC 9.5 8.7 5.3 7.6   ANEU 7.1 5.8 3.1 4.6   ALYM 1.6 2.2 1.6 2.2   GEOVANI 0.5 0.5 0.4 0.7   AEOS 0.2 0.2 0.1 0.1   HGB 13.9 14.0 14.9 15.9*   HCT 42.6 42.9 42.7 46.2    301 266 246     Iron Testing    Recent Labs   Lab Test 02/20/19  1624 02/05/18  1725 04/30/15  0807 01/22/15  0813   MCV 85 87 82 84     Thyroid Studies     Recent Labs   Lab Test 01/22/15  0813 08/12/13  1359   TSH 1.96 1.16     Urine Studies     Recent Labs   Lab Test 02/20/19  1604 06/05/12  1341 06/01/12  1833 04/01/11  0939   URINEPH 6.0 6.5 5.5 5.5   NITRITE Negative Negative Negative Negative   LEUKEST Negative Trace* Negative Negative   WBCU <1 O - 2 O - 2 O - 2     Microbiology:  Last Culture results with specimen source  Culture Micro   Date Value Ref Range Status   01/30/2018 No Beta Streptococcus isolated  Final   04/21/2010 No Beta Streptococcus isolated  Final   04/19/2010 No Beta Streptococcus isolated  Final   04/21/2009 No Beta Streptococcus isolated  Final   02/20/2009 No Beta Streptococcus isolated  Final   02/12/2009 No Beta Streptococcus isolated  Final   07/01/2008 No Beta Streptococcus isolated  Final   03/07/2008 No Beta Streptococcus isolated  Final   12/14/2007 No Beta Streptococcus isolated  Final   12/02/2006 No Beta Streptococcus isolated  Final   09/11/2006 No Beta Streptococcus isolated  Final   03/08/2006 No Beta Streptococcus isolated  Final   10/07/2003 No Beta Streptococcus isolated  Final   09/16/2003 No Beta Streptococcus isolated  Final    Specimen Description   Date Value Ref Range Status   01/30/2018 Nasopharyngeal  Final   01/30/2018 Throat  Final   01/30/2018 Throat  Final   04/21/2010 Throat  Final   04/21/2010 Throat  Final   04/19/2010 Throat  Final   04/19/2010 Throat  Final   04/21/2009 Throat  Final   04/21/2009 Throat  Final   02/20/2009 Throat  Final   02/20/2009 Throat  Final   02/12/2009 Throat  Final   02/12/2009 Throat  Final   07/01/2008 Throat  Final    07/01/2008 Throat  Final   03/07/2008 Throat  Final   03/07/2008 Throat  Final   12/14/2007 Throat  Final   12/14/2007 Throat  Final   12/02/2006 Throat  Final   12/02/2006 Throat  Final   10/27/2006 Throat  Final   09/11/2006 Throat  Final   09/11/2006 Throat  Final   03/08/2006 Throat  Final   03/08/2006 Throat  Final   11/17/2004 Throat  Final   10/07/2003 Throat  Final   10/07/2003 Throat  Final   09/16/2003 Throat  Final   09/16/2003 Throat  Final   04/10/2003 Throat  Final        Respiratory virus testing    Recent Labs   Lab Test 01/30/18  1326   IRSV Negative     Recent Labs   Lab Test 01/30/18  1326 01/29/18  1631   AFLU  --  NEGATIVE   BFLU  --  NEGATIVE   INFZA Negative  --    INFZB Negative  --      CMV Antibody IgG   Date Value Ref Range Status   02/05/2018 5.9 (H) 0.0 - 0.8 AI Final     Comment:     Positive  Antibody index (AI) values reflect qualitative changes in antibody   concentration that cannot be directly associated with clinical condition or   disease state.       CMV Antibody IgM   Date Value Ref Range Status   02/05/2018 <0.2 0.0 - 0.8 AI Final     Comment:     Negative  Antibody index (AI) values reflect qualitative changes in antibody   concentration that cannot be directly associated with clinical condition or   disease state.       EBV Capsid Antibody IgG   Date Value Ref Range Status   02/05/2018 3.4 (H) 0.0 - 0.8 AI Final     Comment:     Positive, suggests recent or past exposure  Antibody index (AI) values reflect qualitative changes in antibody   concentration that cannot be directly associated with clinical condition or   disease state.       EBV Capsid Antibody IgM   Date Value Ref Range Status   02/05/2018 <0.2 0.0 - 0.8 AI Final     Comment:     No detectable antibody.  Antibody index (AI) values reflect qualitative changes in antibody   concentration that cannot be directly associated with clinical condition or   disease state.       Imaging:  Results for orders placed or  performed during the hospital encounter of 01/30/18   Chest XR,  PA & LAT    Narrative    Exam:  Chest X-ray 1/30/2018 1:40 PM    History: fever- eval for infiltrate;     Comparison: None    Findings: PA and lateral chest radiographs are obtained. Cardiac  mediastinal silhouette is within normal limits. Pulmonary vasculature  is distinct. Lungs are hyperinflated. Trachea is midline. No focal  pulmonary opacities. No pleural effusion or pneumothorax. The upper  abdomen is unremarkable. No acute bony abnormalities.       Impression    Impression:   No acute cardiopulmonary abnormalities.    I have personally reviewed the examination and initial interpretation  and I agree with the findings.    NANCI CASTILLO MD

## 2019-02-20 NOTE — LETTER
2/20/2019      RE: Remy Holloway  [address]  [facility] [id number]       [facility]  Infectious Disease Clinic Note:  New Patient     Patient:  Remy Holloway, Date of birth 2000, Medical record number [id number]  Date of Visit:  02/20/2019  Consult requested by Dr.Janine Faustin for evaluation of fevers of uncertain etiology         Assessment and Recommendations:     Recommendations:  - Check CBC with diff, CMP, ESR, CRP, Blood cultures x 2, UA, HIV, Urine chlamydia, gonorrhea, Treponema antibodies,  Quantiferon, Lyme serology, MARY, mononucleosis screen  - Advised to keep a log of fevers including dates and times, and temperatures before and after tylenol and associated symptoms.  - Follow up in 1 month    Pt seen and plan of care discussed with Staff ID Physician Dr Vicente Brady  PGY4 Infectious Diseases Fellow  Pager: [phone]    Assessment:  18 y/o with autism spectrum disorder, gender dysphoria, BPAD here with recurrent fevers for about 1 year. Prefers female pronouns and the name Seema. Intermittent spikes described, sometimes with weeks in between temperatures. History and examination without any definite localizing signs. Advised patient and father to make a log of fevers and associated symptoms to better ascertain diagnosis as fevers of unknown origin. Will start workup with CBC with diff, CMP, ERS, CRP, Blood cultures, UA, Quantiferon, Patient also reports genital soreness with past history of unprotected sex. Amenable to STD testing. Will check HIV, test for syphilis, gonorrhea, chlamydia. Has a history of going to camp in [place] every summer, and also reports living in [place] the woods in the past in [id number]. Would check Lyme serologies as well. Other tick borne illnesses are not likely given clinical course. Intermittently describes sore throat, nasal congestion. H/o recurrent Otitis media as a child, tympanostomy tubes  removed in 2002. Will consider sinus imaging if above tests negative and fevers persist.Will check a mononucleosis screen. Father also with question of familial mediterranean fever, reports question was raised by PCP, but no family history reported. Will pursue above workup first before considering other etiologies.        History of the Infectious Disease lllness:     20 y/o with autism spectrum disorder, gender dysphoria, BPAD here with recurrent fevers. Prefers female pronouns and the name Seema. Symptoms started towards the end of January 2018. She was on a trip to [place] with her parents when she was noticed to have a fever, which lasted about a week and resolved. When they returned to [place], her fevers returned. She was seen by her PCP, who did a rapid flu test which was negative. She had some sore throat and sinus congestion at the time, along with myalgias and was treated with Doxycycline for sinusitis. She was seen in the ER on 1/30/2018, where some posterior pharyngeal erythema was noted. No fevers were noted in the ER. Flu PCRs, strep throat, and CXR were negative. In 02/2018, they visited an IM physician with persistent fevers. Again, she was afebrile at the clinic. CBC with diff, LFTs were ordered and were negative. CMV and EBV IgGs were positive, IgMs were negative. They returned to PCP in Jan 2019, and complained of persistent fever spikes, and was referred to ID.    Patient is here with her father today. Fevers described as occasional spikes, with sometimes weeks in between. None currently. Associated with chills and a sick feeling, sometimes with sore throat, nasal congestion. When she gets the episodes, she often misses school/work. No abdominal pain, nausea, vomiting, dysuria, rash, joint pain or swelling. Intermittent constipation and diarrhea, attributed to psychotropic meds. Patient also reports some genital soreness, with no discharge or ulcers. Last sexual encounter reported >6 months ago,  unprotected, with a female partner. Is amenable to STD testing. Goes to a camp in [place] every summer, where she engages in swimming, hiking. Also reports living near the woods in [place] in [id number]. 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 No history of exposure to homelessness or residential recently. She reports getting PPDs as part of her employment, which have been reported as negative. She denies any recent travel other than to [place]. No sick contacts. No use of steroids or immunosuppression. No weight loss. No history of lymphoma or rheumatological disease in the family. H/o recurrent otitis media in childhood, tympanostomy tubes removed in 2002. No hearing loss, pain, discharge or other ear symptoms recently.  Reports being treated for a UTI in December 2018 by an outside provider. No other antibiotic use.    Review of Systems:  CONSTITUTIONAL:  Fevers, chills+ No night sweats  EYES: negative for icterus or acute vision changes.   ENT:  negative for hearing loss, tinnitus. Reports occasional sore throat.  RESPIRATORY:  negative for cough, sputum, dyspnea  CARDIOVASCULAR:  negative for chest pain, heart palpitations  GASTROINTESTINAL:  negative for nausea, vomiting, diarrhea or constipation  GENITOURINARY:  negative for dysuria or hematuria. Genital soreness reported+  HEME:  No easy bruising or bleeding  INTEGUMENT:  negative for rash or pruritus  NEURO:  Negative for headache or tremor.    Past Medical History:   Diagnosis Date     Amblyopia, unspecified      Esotropia, unspecified      Lack of normal physiological development, unspecified     Normal chromosomes, nl MRI, neg fragile X     Redundant prepuce and phimosis     Penile coronal adhesions-repaired     Umbilical hernia without mention of obstruction or gangrene     repaired     Unspecified otitis media     Recurrent     Past Surgical History:   Procedure Laterality Date     DENTAL SURGERY      wisdom teeth removed     HC CREATE EARDRUM  OPENING,GEN ANESTH  8/2000    P.E. Tubes - removed 2002     HC REPAIR, INCOMPLETE CIRCUMCISION  2000    Recircumcised     HERNIA REPAIR, UMBILICAL  2000    repair umbilical hernia     Family History   Problem Relation Age of Onset     Migraines Mother    Breast cancer in mother    Social History     Social History Narrative    Environmental History    Child is around people that smoke: No     Child's home has well water: No    Child's home has filtered water:No    Child has pets in the home: Yes    Child's home/apartment was built before 1950:Yes    Child attends : Yes    Child has exposure to sibling/playmate with lead poisoning: No    Child has exposure to someone with tuberculosis: No    Child home have guns/firearms without trigger locks: No    Child home have guns/firearms with trigger locks: No    There are concerns about the child's exposure to violence in the home: No        Family Information:    Parent #1    Name: SARA Mcdonough, 12-2-63  Education:  Post grad Masters  Occupation:     Parent #2    Name: AAMIR Verma, 1-16-64  Education: Post Grad  Occupation:         Relationship Status of Parent(s):     Who does the child live with? mother, father and brother(s)    What language(s) is/are spoken at home? English     Nuria Reyes RN                             Social History     Tobacco Use     Smoking status: Never Smoker     Smokeless tobacco: Never Used   Substance Use Topics     Alcohol use: No     Drug use: No     Immunization History   Administered Date(s) Administered     Comvax (HIB/HepB) [birth year], 02/16/2000     DTAP (<7y) 2000, 2000, 2000, 06/09/2000, 03/02/2004     HEPA 02/14/2007, 12/14/2007     HPV 07/24/2012, 11/20/2013, 08/13/2014     Influenza (H1N1) 01/24/2010     Influenza (IIV3) PF 02/14/2007     Influenza Intranasal Vaccine 11/15/2007, 10/07/2008     Influenza Intranasal Vaccine 4 valent 11/20/2013      Influenza Vaccine IM 3yrs+ 4 Valent IIV4 10/04/2018     MMR 06/09/2000, 03/08/2001, 03/02/2004     Meningococcal (Menactra ) 04/12/2011, 04/19/2018     OPV, trivalent, live 1999     Poliovirus, inactivated (IPV) 2000, 2000, 03/02/2004     TDAP Vaccine (Boostrix) 04/12/2011     Varicella 02/16/2000, 02/14/2007     Yellow Fever 01/19/2010     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)     Gluten-sensitive enteropathy     Gluten intolerance     Seasonal allergic rhinitis     Mild intermittent asthma without complication     Gender dysphoria, unspecified     Outpatient Medications Marked as Taking for the 2/20/19 encounter (Office Visit) with Timothy Taylor MD   Medication Sig     acetylcysteine (N-ACETYL CYSTEINE) 600 MG CAPS capsule Take one po bid     clonazePAM (KLONOPIN) 1 MG tablet 0.5 mg 2 times daily as needed Take 0.5 -1 tablet in the morning, and may repeat one additional dose q day prn     guanFACINE HCl (INTUNIV) 3 MG TB24 24 hr tablet 4 mg Take one daily for ADHD     lithium (ESKALITH) 450 MG CR tablet Take a single 450mg tablet po q hs along with a single 300mg tablet at hs     lithium (ESKALITH/LITHOBID) 300 MG CR tablet 750 mg 2 times daily Take 2 po q am and one po q hs along with 450mg dose     loratadine (CLARITIN) 10 MG capsule Take 10 mg by mouth daily     lurasidone (LATUDA) 80 MG TABS tablet Take 160 mg by mouth     melatonin 3 MG tablet Take 6-9 mg by mouth     metFORMIN (GLUCOPHAGE) 500 MG tablet Take  "500 mg by mouth 2 times daily (with meals)     polyethylene glycol (MIRALAX/GLYCOLAX) powder None Entered     QUEtiapine (SEROQUEL) 400 MG tablet Take 1 tablet (400 mg) by mouth At Bedtime Take 2 (200mg) po q hs     QUEtiapine (SEROQUEL) 50 MG tablet 50 mg as needed Take 1-2 po TID prn     Allergies   Allergen Reactions     Gluten Meal Other (See Comments)     No Known Allergies           Physical Exam:   Vitals were reviewed.  All vitals stable  /84   Pulse 78   Temp 97.6  F (36.4  C) (Oral)   Ht 1.867 m (6' 1.5\")   Wt 95.8 kg (211 lb 3.2 oz)   SpO2 96%   BMI 27.49 kg/m     Wt Readings from Last 4 Encounters:   02/20/19 95.8 kg (211 lb 3.2 oz)   01/24/19 95.7 kg (211 lb) (95 %)*   06/11/18 89.4 kg (197 lb 1.3 oz) (92 %)*   04/19/18 88 kg (194 lb) (91 %)*     * Growth percentiles are based on [facility] (Boys, 2-20 Years) data.     Exam:  GENERAL: well-developed, well-nourished, alert, oriented, in no acute distress.  HEENT: Head is normocephalic, atraumatic.   EYES: Eyes have anicteric sclerae.    ENT: Oropharynx is moist without exudates or ulcers. Bilateral ears ceruminous R>L  NECK: Supple.  LUNGS: Clear to auscultation.  CARDIOVASCULAR: Regular rate and rhythm with no murmurs, gallops or rubs.  ABDOMEN: Normal bowel sounds, soft, nontender.  SKIN: No acute rashes. No nail changes.   NEUROLOGIC: Grossly nonfocal.         Laboratory Data:     Metabolic Studies    2/18: BMP normal    Hepatic Studies    2/18 LFTs normal    Pancreatitis testing    Recent Labs   Lab Test 04/30/15  0807 01/22/15  0813   TRIG 52 53     Lipid testing    Recent Labs   Lab Test 04/30/15  0807 01/22/15  0813   CHOL 147 129   HDL 70 78   LDL 67 40   TRIG 52 53   CHOLHDLRATIO 2.1 1.7     Hematology Studies   WBC 9.4 2/18    Thyroid Studies     Recent Labs   Lab Test 01/22/15  0813 08/12/13  1359   TSH 1.96 1.16     Microbiology:  Last Culture results with specimen source  Culture Micro   Date Value Ref Range Status   01/30/2018 No Beta " Streptococcus isolated  Final   04/21/2010 No Beta Streptococcus isolated  Final   04/19/2010 No Beta Streptococcus isolated  Final   04/21/2009 No Beta Streptococcus isolated  Final   02/20/2009 No Beta Streptococcus isolated  Final   02/12/2009 No Beta Streptococcus isolated  Final   07/01/2008 No Beta Streptococcus isolated  Final   03/07/2008 No Beta Streptococcus isolated  Final   12/14/2007 No Beta Streptococcus isolated  Final   12/02/2006 No Beta Streptococcus isolated  Final   09/11/2006 No Beta Streptococcus isolated  Final   03/08/2006 No Beta Streptococcus isolated  Final   10/07/2003 No Beta Streptococcus isolated  Final   09/16/2003 No Beta Streptococcus isolated  Final    Specimen Description   Date Value Ref Range Status   01/30/2018 Nasopharyngeal  Final   01/30/2018 Throat  Final   01/30/2018 Throat  Final   04/21/2010 Throat  Final   04/21/2010 Throat  Final   04/19/2010 Throat  Final   04/19/2010 Throat  Final   04/21/2009 Throat  Final   04/21/2009 Throat  Final   02/20/2009 Throat  Final   02/20/2009 Throat  Final   02/12/2009 Throat  Final   02/12/2009 Throat  Final   07/01/2008 Throat  Final   07/01/2008 Throat  Final   03/07/2008 Throat  Final   03/07/2008 Throat  Final   12/14/2007 Throat  Final   12/14/2007 Throat  Final   12/02/2006 Throat  Final   12/02/2006 Throat  Final   10/27/2006 Throat  Final   09/11/2006 Throat  Final   09/11/2006 Throat  Final   03/08/2006 Throat  Final   03/08/2006 Throat  Final   11/17/2004 Throat  Final   10/07/2003 Throat  Final   10/07/2003 Throat  Final   09/16/2003 Throat  Final   09/16/2003 Throat  Final   04/10/2003 Throat  Final        Respiratory virus testing    Recent Labs   Lab Test 01/30/18  1326   IRSV Negative     Recent Labs   Lab Test 01/30/18  1326 01/29/18  1631   AFLU  --  NEGATIVE   BFLU  --  NEGATIVE   INFZA Negative  --    INFZB Negative  --      CMV Antibody IgG   Date Value Ref Range Status   02/05/2018 5.9 (H) 0.0 - 0.8 AI Final      Comment:     Positive  Antibody index (AI) values reflect qualitative changes in antibody   concentration that cannot be directly associated with clinical condition or   disease state.       CMV Antibody IgM   Date Value Ref Range Status   02/05/2018 <0.2 0.0 - 0.8 AI Final     Comment:     Negative  Antibody index (AI) values reflect qualitative changes in antibody   concentration that cannot be directly associated with clinical condition or   disease state.       EBV Capsid Antibody IgG   Date Value Ref Range Status   02/05/2018 3.4 (H) 0.0 - 0.8 AI Final     Comment:     Positive, suggests recent or past exposure  Antibody index (AI) values reflect qualitative changes in antibody   concentration that cannot be directly associated with clinical condition or   disease state.       EBV Capsid Antibody IgM   Date Value Ref Range Status   02/05/2018 <0.2 0.0 - 0.8 AI Final     Comment:     No detectable antibody.  Antibody index (AI) values reflect qualitative changes in antibody   concentration that cannot be directly associated with clinical condition or   disease state.       Imaging:  Results for orders placed or performed during the hospital encounter of 01/30/18   Chest XR,  PA & LAT    Narrative    Exam:  Chest X-ray 1/30/2018 1:40 PM    History: fever- eval for infiltrate;     Comparison: None    Findings: PA and lateral chest radiographs are obtained. Cardiac  mediastinal silhouette is within normal limits. Pulmonary vasculature  is distinct. Lungs are hyperinflated. Trachea is midline. No focal  pulmonary opacities. No pleural effusion or pneumothorax. The upper  abdomen is unremarkable. No acute bony abnormalities.       Impression    Impression:   No acute cardiopulmonary abnormalities.    I have personally reviewed the examination and initial interpretation  and I agree with the findings.    NANCI CASTILLO MD         Infectious Disease Clinic Staff Note: Ms. Holloway was seen, examined, and the case was  discussed with Dr. Taylor, ID Fellow -- I agree with her consultative patient history and examination, assessment and plan in this outpatient ID Consult note. This note reflects my observations and opinions and the plan outlined fully reflects my approach. I have reviewed the available history, radiology, laboratory results, and reports with the Fellow.    Timothy Taylor MD

## 2019-02-20 NOTE — NURSING NOTE
"Chief Complaint   Patient presents with     Consult     Fevers of unknown etiology     /84   Pulse 78   Temp 97.6  F (36.4  C) (Oral)   Ht 1.867 m (6' 1.5\")   Wt 95.8 kg (211 lb 3.2 oz)   SpO2 96%   BMI 27.49 kg/m    Nargis Cueto CMA    "

## 2019-02-21 LAB
ANA SER QL IF: NEGATIVE
B BURGDOR IGG+IGM SER QL: 0.03 (ref 0–0.89)
C TRACH DNA SPEC QL NAA+PROBE: NEGATIVE
HETEROPH AB SER QL: NEGATIVE
HIV 1+2 AB+HIV1 P24 AG SERPL QL IA: NONREACTIVE
N GONORRHOEA DNA SPEC QL NAA+PROBE: NEGATIVE
SPECIMEN SOURCE: NORMAL
SPECIMEN SOURCE: NORMAL
T PALLIDUM AB SER QL: NONREACTIVE

## 2019-02-21 NOTE — PROGRESS NOTES
[facility]  Infectious Disease Clinic Note:  New Patient     Patient:  Remy Holloway, Date of birth 2000, Medical record number [id number]  Date of Visit:  02/20/2019  Consult requested by Dr.Janine Faustin for evaluation of fevers of uncertain etiology         Assessment and Recommendations:     Recommendations:  - Check CBC with diff, CMP, ESR, CRP, Blood cultures x 2, UA, HIV, Urine chlamydia, gonorrhea, Treponema antibodies,  Quantiferon, Lyme serology, MARY, mononucleosis screen  - Advised to keep a log of fevers including dates and times, and temperatures before and after tylenol and associated symptoms.  - Follow up in 1 month    Pt seen and plan of care discussed with Staff ID Physician Dr Vicente Brady  PGY4 Infectious Diseases Fellow  Pager: [phone]    Assessment:  18 y/o with autism spectrum disorder, gender dysphoria, BPAD here with recurrent fevers for about 1 year. Prefers female pronouns and the name Seema. Intermittent spikes described, sometimes with weeks in between temperatures. History and examination without any definite localizing signs. Advised patient and father to make a log of fevers and associated symptoms to better ascertain diagnosis as fevers of unknown origin. Will start workup with CBC with diff, CMP, ERS, CRP, Blood cultures, UA, Quantiferon, Patient also reports genital soreness with past history of unprotected sex. Amenable to STD testing. Will check HIV, test for syphilis, gonorrhea, chlamydia. Has a history of going to camp in [place] every summer, and also reports living in [place] in [id number]. Would check Lyme serologies as well. Other tick borne illnesses are not likely given clinical course. Intermittently describes sore throat, nasal congestion. H/o recurrent Otitis media as a child, tympanostomy tubes removed in 2002. Will consider sinus imaging if above tests negative and fevers  persist.Will check a mononucleosis screen. Father also with question of familial mediterranean fever, reports question was raised by PCP, but no family history reported. Will pursue above workup first before considering other etiologies.        History of the Infectious Disease lllness:     20 y/o with autism spectrum disorder, gender dysphoria, BPAD here with recurrent fevers. Prefers female pronouns and the name Seema. Symptoms started towards the end of January 2018. She was on a trip to [place] with her parents when she was noticed to have a fever, which lasted about a week and resolved. When they returned to [place], her fevers returned. She was seen by her PCP, who did a rapid flu test which was negative. She had some sore throat and sinus congestion at the time, along with myalgias and was treated with Doxycycline for sinusitis. She was seen in the ER on 1/30/2018, where some posterior pharyngeal erythema was noted. No fevers were noted in the ER. Flu PCRs, strep throat, and CXR were negative. In 02/2018, they visited an IM physician with persistent fevers. Again, she was afebrile at the clinic. CBC with diff, LFTs were ordered and were negative. CMV and EBV IgGs were positive, IgMs were negative. They returned to PCP in Jan 2019, and complained of persistent fever spikes, and was referred to ID.    Patient is here with her father today. Fevers described as occasional spikes, with sometimes weeks in between. None currently. Associated with chills and a sick feeling, sometimes with sore throat, nasal congestion. When she gets the episodes, she often misses school/work. No abdominal pain, nausea, vomiting, dysuria, rash, joint pain or swelling. Intermittent constipation and diarrhea, attributed to psychotropic meds. Patient also reports some genital soreness, with no discharge or ulcers. Last sexual encounter reported >6 months ago, unprotected, with a female partner. Is amenable to STD testing. Goes to a camp in  [place] every summer, where she engages in swimming, hiking. Also reports living near the woods in [place] in [phone]. 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 No history of exposure to homelessness or care home recently. She reports getting PPDs as part of her employment, which have been reported as negative. She denies any recent travel other than to [place]. No sick contacts. No use of steroids or immunosuppression. No weight loss. No history of lymphoma or rheumatological disease in the family. H/o recurrent otitis media in childhood, tympanostomy tubes removed in 2002. No hearing loss, pain, discharge or other ear symptoms recently.  Reports being treated for a UTI in December 2018 by an outside provider. No other antibiotic use.    Review of Systems:  CONSTITUTIONAL:  Fevers, chills+ No night sweats  EYES: negative for icterus or acute vision changes.   ENT:  negative for hearing loss, tinnitus. Reports occasional sore throat.  RESPIRATORY:  negative for cough, sputum, dyspnea  CARDIOVASCULAR:  negative for chest pain, heart palpitations  GASTROINTESTINAL:  negative for nausea, vomiting, diarrhea or constipation  GENITOURINARY:  negative for dysuria or hematuria. Genital soreness reported+  HEME:  No easy bruising or bleeding  INTEGUMENT:  negative for rash or pruritus  NEURO:  Negative for headache or tremor.    Past Medical History:   Diagnosis Date     Amblyopia, unspecified      Esotropia, unspecified      Lack of normal physiological development, unspecified     Normal chromosomes, nl MRI, neg fragile X     Redundant prepuce and phimosis     Penile coronal adhesions-repaired     Umbilical hernia without mention of obstruction or gangrene     repaired     Unspecified otitis media     Recurrent     Past Surgical History:   Procedure Laterality Date     DENTAL SURGERY      wisdom teeth removed     HC CREATE EARDRUM OPENING,GEN ANESTH  8/2000    P.E. Tubes - removed 2002     HC REPAIR, INCOMPLETE  CIRCUMCISION  2000    Recircumcised     HERNIA REPAIR, UMBILICAL  2000    repair umbilical hernia     Family History   Problem Relation Age of Onset     Migraines Mother    Breast cancer in mother    Social History     Social History Narrative    Environmental History    Child is around people that smoke: No     Child's home has well water: No    Child's home has filtered water:No    Child has pets in the home: Yes    Child's home/apartment was built before 1950:Yes    Child attends : Yes    Child has exposure to sibling/playmate with lead poisoning: No    Child has exposure to someone with tuberculosis: No    Child home have guns/firearms without trigger locks: No    Child home have guns/firearms with trigger locks: No    There are concerns about the child's exposure to violence in the home: No        Family Information:    Parent #1    Name: SARA Mcdonough, 12-2-63  Education:  Post grad Masters  Occupation:     Parent #2    Name: Daisha  Ana Lilia AAMIR, 1-16-64  Education: Post Grad  Occupation:         Relationship Status of Parent(s):     Who does the child live with? mother, father and brother(s)    What language(s) is/are spoken at home? English     Nuria Reyes RN                             Social History     Tobacco Use     Smoking status: Never Smoker     Smokeless tobacco: Never Used   Substance Use Topics     Alcohol use: No     Drug use: No     Immunization History   Administered Date(s) Administered     Comvax (HIB/HepB) [birth year], 02/16/2000     DTAP (<7y) 2000, 2000, 2000, 06/09/2000, 03/02/2004     HEPA 02/14/2007, 12/14/2007     HPV 07/24/2012, 11/20/2013, 08/13/2014     Influenza (H1N1) 01/24/2010     Influenza (IIV3) PF 02/14/2007     Influenza Intranasal Vaccine 11/15/2007, 10/07/2008     Influenza Intranasal Vaccine 4 valent 11/20/2013     Influenza Vaccine IM 3yrs+ 4 Valent IIV4 10/04/2018     MMR 06/09/2000, 03/08/2001,  03/02/2004     Meningococcal (Menactra ) 04/12/2011, 04/19/2018     OPV, trivalent, live 1999     Poliovirus, inactivated (IPV) 2000, 1999, 03/02/2004     TDAP Vaccine (Boostrix) 04/12/2011     Varicella 02/16/2000, 02/14/2007     Yellow Fever 01/19/2010     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)     Gluten-sensitive enteropathy     Gluten intolerance     Seasonal allergic rhinitis     Mild intermittent asthma without complication     Gender dysphoria, unspecified     Outpatient Medications Marked as Taking for the 2/20/19 encounter (Office Visit) with Timothy Taylor MD   Medication Sig     acetylcysteine (N-ACETYL CYSTEINE) 600 MG CAPS capsule Take one po bid     clonazePAM (KLONOPIN) 1 MG tablet 0.5 mg 2 times daily as needed Take 0.5 -1 tablet in the morning, and may repeat one additional dose q day prn     guanFACINE HCl (INTUNIV) 3 MG TB24 24 hr tablet 4 mg Take one daily for ADHD     lithium (ESKALITH) 450 MG CR tablet Take a single 450mg tablet po q hs along with a single 300mg tablet at hs     lithium (ESKALITH/LITHOBID) 300 MG CR tablet 750 mg 2 times daily Take 2 po q am and one po q hs along with 450mg dose     loratadine (CLARITIN) 10 MG capsule Take 10 mg by mouth daily     lurasidone (LATUDA) 80 MG TABS tablet Take 160 mg by mouth     melatonin 3 MG tablet Take 6-9 mg by mouth     metFORMIN (GLUCOPHAGE) 500 MG tablet Take 500 mg by mouth 2 times daily (with meals)     polyethylene glycol  "(MIRALAX/GLYCOLAX) powder None Entered     QUEtiapine (SEROQUEL) 400 MG tablet Take 1 tablet (400 mg) by mouth At Bedtime Take 2 (200mg) po q hs     QUEtiapine (SEROQUEL) 50 MG tablet 50 mg as needed Take 1-2 po TID prn     Allergies   Allergen Reactions     Gluten Meal Other (See Comments)     No Known Allergies           Physical Exam:   Vitals were reviewed.  All vitals stable  /84   Pulse 78   Temp 97.6  F (36.4  C) (Oral)   Ht 1.867 m (6' 1.5\")   Wt 95.8 kg (211 lb 3.2 oz)   SpO2 96%   BMI 27.49 kg/m    Wt Readings from Last 4 Encounters:   02/20/19 95.8 kg (211 lb 3.2 oz)   01/24/19 95.7 kg (211 lb) (95 %)*   06/11/18 89.4 kg (197 lb 1.3 oz) (92 %)*   04/19/18 88 kg (194 lb) (91 %)*     * Growth percentiles are based on CDC (Boys, 2-20 Years) data.     Exam:  GENERAL: well-developed, well-nourished, alert, oriented, in no acute distress.  HEENT: Head is normocephalic, atraumatic.   EYES: Eyes have anicteric sclerae.    ENT: Oropharynx is moist without exudates or ulcers. Bilateral ears ceruminous R>L  NECK: Supple.  LUNGS: Clear to auscultation.  CARDIOVASCULAR: Regular rate and rhythm with no murmurs, gallops or rubs.  ABDOMEN: Normal bowel sounds, soft, nontender.  SKIN: No acute rashes. No nail changes.   NEUROLOGIC: Grossly nonfocal.         Laboratory Data:     Metabolic Studies    2/18: BMP normal    Hepatic Studies    2/18 LFTs normal    Pancreatitis testing    Recent Labs   Lab Test 04/30/15  0807 01/22/15  0813   TRIG 52 53     Lipid testing    Recent Labs   Lab Test 04/30/15  0807 01/22/15  0813   CHOL 147 129   HDL 70 78   LDL 67 40   TRIG 52 53   CHOLHDLRATIO 2.1 1.7     Hematology Studies   WBC 9.4 2/18    Thyroid Studies     Recent Labs   Lab Test 01/22/15  0813 08/12/13  1359   TSH 1.96 1.16     Microbiology:  Last Culture results with specimen source  Culture Micro   Date Value Ref Range Status   01/30/2018 No Beta Streptococcus isolated  Final   04/21/2010 No Beta Streptococcus " isolated  Final   04/19/2010 No Beta Streptococcus isolated  Final   04/21/2009 No Beta Streptococcus isolated  Final   02/20/2009 No Beta Streptococcus isolated  Final   02/12/2009 No Beta Streptococcus isolated  Final   07/01/2008 No Beta Streptococcus isolated  Final   03/07/2008 No Beta Streptococcus isolated  Final   12/14/2007 No Beta Streptococcus isolated  Final   12/02/2006 No Beta Streptococcus isolated  Final   09/11/2006 No Beta Streptococcus isolated  Final   03/08/2006 No Beta Streptococcus isolated  Final   10/07/2003 No Beta Streptococcus isolated  Final   09/16/2003 No Beta Streptococcus isolated  Final    Specimen Description   Date Value Ref Range Status   01/30/2018 Nasopharyngeal  Final   01/30/2018 Throat  Final   01/30/2018 Throat  Final   04/21/2010 Throat  Final   04/21/2010 Throat  Final   04/19/2010 Throat  Final   04/19/2010 Throat  Final   04/21/2009 Throat  Final   04/21/2009 Throat  Final   02/20/2009 Throat  Final   02/20/2009 Throat  Final   02/12/2009 Throat  Final   02/12/2009 Throat  Final   07/01/2008 Throat  Final   07/01/2008 Throat  Final   03/07/2008 Throat  Final   03/07/2008 Throat  Final   12/14/2007 Throat  Final   12/14/2007 Throat  Final   12/02/2006 Throat  Final   12/02/2006 Throat  Final   10/27/2006 Throat  Final   09/11/2006 Throat  Final   09/11/2006 Throat  Final   03/08/2006 Throat  Final   03/08/2006 Throat  Final   11/17/2004 Throat  Final   10/07/2003 Throat  Final   10/07/2003 Throat  Final   09/16/2003 Throat  Final   09/16/2003 Throat  Final   04/10/2003 Throat  Final        Respiratory virus testing    Recent Labs   Lab Test 01/30/18  1326   IRSV Negative     Recent Labs   Lab Test 01/30/18  1326 01/29/18  1631   AFLU  --  NEGATIVE   BFLU  --  NEGATIVE   INFZA Negative  --    INFZB Negative  --      CMV Antibody IgG   Date Value Ref Range Status   02/05/2018 5.9 (H) 0.0 - 0.8 AI Final     Comment:     Positive  Antibody index (AI) values reflect  qualitative changes in antibody   concentration that cannot be directly associated with clinical condition or   disease state.       CMV Antibody IgM   Date Value Ref Range Status   02/05/2018 <0.2 0.0 - 0.8 AI Final     Comment:     Negative  Antibody index (AI) values reflect qualitative changes in antibody   concentration that cannot be directly associated with clinical condition or   disease state.       EBV Capsid Antibody IgG   Date Value Ref Range Status   02/05/2018 3.4 (H) 0.0 - 0.8 AI Final     Comment:     Positive, suggests recent or past exposure  Antibody index (AI) values reflect qualitative changes in antibody   concentration that cannot be directly associated with clinical condition or   disease state.       EBV Capsid Antibody IgM   Date Value Ref Range Status   02/05/2018 <0.2 0.0 - 0.8 AI Final     Comment:     No detectable antibody.  Antibody index (AI) values reflect qualitative changes in antibody   concentration that cannot be directly associated with clinical condition or   disease state.       Imaging:  Results for orders placed or performed during the hospital encounter of 01/30/18   Chest XR,  PA & LAT    Narrative    Exam:  Chest X-ray 1/30/2018 1:40 PM    History: fever- eval for infiltrate;     Comparison: None    Findings: PA and lateral chest radiographs are obtained. Cardiac  mediastinal silhouette is within normal limits. Pulmonary vasculature  is distinct. Lungs are hyperinflated. Trachea is midline. No focal  pulmonary opacities. No pleural effusion or pneumothorax. The upper  abdomen is unremarkable. No acute bony abnormalities.       Impression    Impression:   No acute cardiopulmonary abnormalities.    I have personally reviewed the examination and initial interpretation  and I agree with the findings.    NANCI CASTILLO MD

## 2019-02-22 ENCOUNTER — OFFICE VISIT (OUTPATIENT)
Dept: OTHER | Facility: OUTPATIENT CENTER | Age: 19
End: 2019-02-22
Payer: COMMERCIAL

## 2019-02-22 DIAGNOSIS — F64.0 GENDER DYSPHORIA IN ADOLESCENT AND ADULT: Primary | ICD-10-CM

## 2019-02-22 DIAGNOSIS — F84.0 AUTISM SPECTRUM DISORDER: ICD-10-CM

## 2019-02-22 LAB
GAMMA INTERFERON BACKGROUND BLD IA-ACNC: 0.04 IU/ML
M TB IFN-G BLD-IMP: NEGATIVE
M TB IFN-G CD4+ BCKGRND COR BLD-ACNC: 3.66 IU/ML
MITOGEN IGNF BCKGRD COR BLD-ACNC: 0 IU/ML
MITOGEN IGNF BCKGRD COR BLD-ACNC: 0 IU/ML

## 2019-02-22 NOTE — PROGRESS NOTES
"[facility] -  Case Progress Note    2/22/19  Client Name: Remy Stephenson she/her/hers  YOB: 2000  MRN:  [id number]  Treating Provider: Cornelia Avendano PsyD  Type of Session: family  Present in Session: client, client's father, client's mother  Number of Minutes:  55    Current Symptoms/Status:  Client describes incongruence between her birth assigned sex and her experienced gender. Client describes self as a \"gender fluid female\"; noting distress in reaction to being treated as and referred to as male. Client describes body dysphoria regarding some primary and secondary sex characteristics. Client describes preference for flexibility in her gender expression.    Client's parents are legal guardians. Client is a neurodiverse individual (ASD, ADHD).    Progress Toward Treatment Goals:   Attending family session with parents and provider.    Intervention and Response:  CBT, relational, and interpersonal approach to developing rapport and clarifying goals for treatment. Met with client's parents to take time to clarify goals and develop plans for therapy with family moving forward. Parents expressed concerns regarding therapist's practice, including placement of care coordination following initial assessment of gender dysphoria. Parents expressed concern for how information in therapy gets processed by client and ways in which she reacts emotionally when confused. Provider reviewed her office agreement, including boundaries regarding accessibility and alternatives of contacting therapist/clinic in times of crisis. Parents responded from a place of protection and perspective that therapist's practice is \"unprofessional.\" Took time to set out session structure of check-in with client and parents, meet with client independently, and end session with a summary check-in.     Met independently with client. Took time to develop rapport through getting to know client through " exploring her family relationships and other relationships that are important to her. Client shared positive relationships with parents and brother, and excitement around her connection as a fan with local rapper. Ended session exploring any questions client had about the session. Explained session goal of getting to know additional aspects of client beyond her gender identity. Validated, reflected, and normalized client's thoughts and feelings throughout session. Client was open, responsive, and engaged throughout session.      Assignment:  none    Interactive Complexity:  1. The need to manage maladaptive communication (difficulties with receptive and expressive language processing) among participants that complicates delivery of care.    Diagnosis:  302.85 Gender Dysphoria  ASD, ADHD, Bipolar I, anxiety (by history)    Plan / Need for Future Services:  Return for therapy in 2 weeks.      Cornelia Avendano PsyD

## 2019-02-23 NOTE — PROGRESS NOTES
Infectious Disease Clinic Staff Note: Ms. Holloway was seen, examined, and the case was discussed with Dr. Taylor, ID Fellow -- I agree with her consultative patient history and examination, assessment and plan in this outpatient ID Consult note. This note reflects my observations and opinions and the plan outlined fully reflects my approach. I have reviewed the available history, radiology, laboratory results, and reports with the Fellow.

## 2019-02-25 ENCOUNTER — TELEPHONE (OUTPATIENT)
Dept: OTHER | Facility: OUTPATIENT CENTER | Age: 19
End: 2019-02-25

## 2019-02-25 NOTE — TELEPHONE ENCOUNTER
"Cl's father, Ed, LM for provider stating that cl had a difficult time understanding aspects of the feedback session, including the term \"explotation.\" Mr. Holloway noted that given cl's difficulty with receptive language processing, cl often becomes confused and subsequently frustrated and emotionally dysregulated. Mr. Holloway urged provider to keep this in mind and urged provider to contact cl's other providers to coordinate care.     Provider replied to father's message with a call on 2/14/19. Provider LM requesting best contact number for Dr. Rose given no number was listed on [facility]. Provider noted plans to coordinate care with providers in the near future. Provider noted awareness of cl's cognitive differences, suggesting some feedback be reserved for parents alone in the future, as well as plans to explore options for client to get questions answered and for provider to assess understanding in future sessions. Provider suggested parents assist client in writing down her questions to bring to next session.    Cl's father, Ed, CB and LM for provider on 2/19/19 stating that the message from provider \"Cut off\" and he did not receive all of it. Mr. Holloway provided number for client's therapist Jaylin Samson, and did not provide number that was requested.   "

## 2019-02-25 NOTE — TELEPHONE ENCOUNTER
"Provider contacted cl's therapist, Jaylinrudy Samson on 2/14/19. Provider and Ms. Samson set up a time to have a telephone conversation re cl on Wed Feb 20 @ noon.      Ms. Samson LM for provider on 2/20 and sent an email in response to scheduled care coordination. Due to the snow ranulfo, this provider was out of the office and missed the call. This provider emailed Ms. Samson back at 12:41pm on 2/20 as well as called twice. Ms. Samson had stated in her email that she would be available to speak all afternoon due to all her cl's cancelling due to weather.     This Provider called and spoke with Ms. Samson on 2/22/19. Ms. Samson was in [place] on vacation. Ms. Samson reported that cl's family have had many providers to varying degrees of success. She noted that client has a history of aggression and significant emotional dysregulation. She reported that client is great to have in group therapy when able to regulate and avoid getting \"reaved up\" particularly about perceived injustices in the world. Provider and Ms. Samson planned to be in contact in the future for further care coordination as needed.  "

## 2019-02-25 NOTE — TELEPHONE ENCOUNTER
"'s psychiatrist, Reina Rose MD, LM for provider noting mert's family is requesting she and provider coordinate care. Dr. Rose noted times over the week that she would be available.     Dr. Rose also emailed provider the following day 2/21/19 with the following message:    \"Jacques's mother has asked me to contact you to discuss his history prior to his visit with you tomorrow.  You can reach me at [phone].  I have left a message previously but have not yet heard back from you.  I have significant concerns about proceeding with any type of hormonal treatment or hormonal suppression.  Jacques has a long history of an autism spectrum disorder and also carries a bipolar disorder diagnosis.  He has an unstable emotional situation which I do not feel is primarily due to gender dysphoria.  He has a history of long periods of mood dysregulation that are associated with aggressive behavior and suicidal thoughts and plans.  His gender dysphoria is not long-standing and has only emerged over the past year or so.  Any use of medications to proceed with transitioning needs to be done extremely cautiously and conservatively.  I would like to discuss his care with you further.  I would be available tomorrow morning between eight and 8:30 or between 11:30 and one.  I am in clinic today and leaving the office at 5:30 so am not available for a phone call today.\"    Provider called and spoke with Dr. Rose on 2/22/19. Dr. Rose described client as difficult and complex to treat from a psychopharmacological standpoint. She described client as emotionally unstable and volatile when in a state of psychological sickness. Provider questioned the urgent nature by which hormone treatment was being talked about between provider and Dr. Rose; noting the message from this provider being that any approach to medical intervention would be assessed through [facility] with care and careful consideration. Dr. Rose noted that mert's mother " "expressed concerns with Dr. Rose re the topic being discussed during recent feedback session. Provider reiterated the message that \"if\" medical intervention is considered, that it would be done in a careful and interdisciplinary way; noting that cl's mother had asked several questions about the topic during the recent feedback session. Provider and Dr. Rose planned to continue care coordination as needed.  "

## 2019-02-26 ENCOUNTER — TELEPHONE (OUTPATIENT)
Dept: FAMILY MEDICINE | Facility: CLINIC | Age: 19
End: 2019-02-26

## 2019-02-26 ENCOUNTER — TELEPHONE (OUTPATIENT)
Dept: INFECTIOUS DISEASES | Facility: CLINIC | Age: 19
End: 2019-02-26

## 2019-02-26 LAB
BACTERIA SPEC CULT: NO GROWTH
Lab: NORMAL
SPECIMEN SOURCE: NORMAL

## 2019-02-26 NOTE — TELEPHONE ENCOUNTER
Placed a call, dad answered and he states that patient may have called because patient is having some mental health issues. 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 Father states there is no need for triage at this time. All questions answered.   Vivien Silva RN  02/26/19  11:34 AM

## 2019-02-26 NOTE — TELEPHONE ENCOUNTER
[facility] Call Center    Phone Message    May a detailed message be left on voicemail: yes    Reason for Call: Other: Patient woke up early this morning with body aches & chills.  He does not have a fever.  He is wanting a call back.  He saw Dr. Taylor regarding fever of unknown origin.       Action Taken: Message routed to:  [facility] ([facility]): Infectious Disease

## 2019-02-26 NOTE — TELEPHONE ENCOUNTER
Health Call Center    Phone Message    May a detailed message be left on voicemail: yes    Reason for Call: Symptoms or Concerns     If patient has red-flag symptoms, warm transfer to triage line    Current symptom or concern: chills, body aches    Symptoms have been present for:  1 years(s)    Has patient previously been seen for this? Yes    By :       Are there any new or worsening symptoms? No      Action Taken: Message routed to:  [facility]: [place]

## 2019-02-27 NOTE — TELEPHONE ENCOUNTER
See note yesterday, 02/27/2019, by Vivien Silva RN. No need for triage at this time.      Karen Ferrer RN

## 2019-03-13 ENCOUNTER — OFFICE VISIT (OUTPATIENT)
Dept: INFECTIOUS DISEASES | Facility: CLINIC | Age: 19
End: 2019-03-13
Attending: INTERNAL MEDICINE
Payer: COMMERCIAL

## 2019-03-13 VITALS
HEART RATE: 83 BPM | DIASTOLIC BLOOD PRESSURE: 75 MMHG | BODY MASS INDEX: 27.46 KG/M2 | OXYGEN SATURATION: 96 % | TEMPERATURE: 97.8 F | SYSTOLIC BLOOD PRESSURE: 116 MMHG | WEIGHT: 214 LBS | HEIGHT: 74 IN

## 2019-03-13 DIAGNOSIS — R50.9 FEVER OF UNKNOWN ORIGIN: ICD-10-CM

## 2019-03-13 DIAGNOSIS — R50.9 FEVER OF UNKNOWN ORIGIN: Primary | ICD-10-CM

## 2019-03-13 LAB — CRP SERPL-MCNC: 12.7 MG/L (ref 0–8)

## 2019-03-13 PROCEDURE — G0463 HOSPITAL OUTPT CLINIC VISIT: HCPCS | Mod: ZF

## 2019-03-13 PROCEDURE — 86140 C-REACTIVE PROTEIN: CPT | Performed by: STUDENT IN AN ORGANIZED HEALTH CARE EDUCATION/TRAINING PROGRAM

## 2019-03-13 PROCEDURE — 87040 BLOOD CULTURE FOR BACTERIA: CPT | Performed by: STUDENT IN AN ORGANIZED HEALTH CARE EDUCATION/TRAINING PROGRAM

## 2019-03-13 PROCEDURE — 36415 COLL VENOUS BLD VENIPUNCTURE: CPT | Performed by: STUDENT IN AN ORGANIZED HEALTH CARE EDUCATION/TRAINING PROGRAM

## 2019-03-13 ASSESSMENT — MIFFLIN-ST. JEOR: SCORE: 2042.51

## 2019-03-13 ASSESSMENT — PAIN SCALES - GENERAL: PAINLEVEL: MODERATE PAIN (5)

## 2019-03-13 NOTE — PROGRESS NOTES
[facility]  Infectious Disease Clinic Note: Established Patient     Patient:  Remy Holloway, Date of birth 2000, Medical record number [id number]  Date of Visit:  03/13/2019  Consult requested by Dr.Janine Faustin for evaluation of fevers of uncertain etiology         Assessment and Recommendations:     Recommendations:  - CT sinuses recommended, patient and family preferred to hold off for now, and consider this again at follow up visit if symptoms persist  - Recheck CRP today  - Recheck another set of blood cultures today  - Advised again to keep a log of fevers including dates and times, and temperatures before and after tylenol, as well as any associated symptoms  - Follow up in 3 months    Plan of care discussed with Staff ID Physician Dr Vicente Brady  PGY4 Infectious Diseases Fellow  Pager: [phone]    Assessment:  20 y/o with autism spectrum disorder, gender dysphoria, BPAD here for 3 week follow up of recurrent fevers for about 1 year. Prefers female pronouns and the name Seema. Intermittent spikes were described at last visit, sometimes with weeks in between temperatures. Advised patient and father to make a log of fevers and associated symptoms to better ascertain diagnosis as fevers of unknown origin. At today's visit, patient reports chills and joint pain. Per father may have had one episode of fever since last visit, but unfortunately no documentation was made. Afebrile in clinic today.    Initial workup done at last visit on 2/20/2019 included CBC with diff, CMP, ESR, Blood cultures, UA and Quantiferon TB, which were found to be negative. Patient also reported genital soreness with past history of unprotected sex, and STD testing including HIV, syphilis, gonorrhea and chlamydia were done, also found to be negative. She also had a history of going to camp in [place] every summer, and [place] in [place] past in  [id number].  Lyme serologies were tested and found negative. Other tick borne illnesses are not likely given clinical course. She also described intermittent sore throat, nasal congestion, with a h/o recurrent Otitis media as a child, tympanostomy tubes removed in 2002. Mononucleosis screen was also done, and negative. Sinus imaging was discussed today, but patient and family preferred to hold off for now, and consider this again at follow up visit if symptoms persist. CRP was elevated at 16. This is a non specific finding in the absence of other evidence of infection, but would repeat to ensure not rising to alarmingly high levels. Would also repeat another set of blood cultures at this point. Patient reported abdominal pain, but per mother this is chronic, and associated with constipation, attributed to her psychiatric meds.     Per our evaluation, no infectious etiology has come to light during history, exam or labs. Other than above mentioned workup, would pursue watchful waiting at this time. Advised again to keep a log of fevers including dates and times, and temperatures before and after tylenol, as well as any associated symptoms, and recommended 3 month follow up. They preferred to call to schedule follow up after further consideration.         History of the Infectious Disease lllness:     18 y/o with autism spectrum disorder, gender dysphoria, BPAD here for follow up of recurrent fevers. Prefers female pronouns and the name Seema.     Symptoms started towards the end of January 2018. She was on a trip to [place] with her parents when she was noticed to have a fever, which lasted about a week and resolved. When they returned to [place], her fevers returned. She was seen by her PCP, who did a rapid flu test which was negative. She had some sore throat and sinus congestion at the time, along with myalgias and was treated with Doxycycline for sinusitis. She was seen in the ER on 1/30/2018, where some posterior  pharyngeal erythema was noted. No fevers were noted in the ER. Flu PCRs, strep throat, and CXR were negative. In 02/2018, they visited an IM physician with persistent fevers. Again, she was afebrile at the clinic. CBC with diff, LFTs were ordered and were negative. CMV and EBV IgGs were positive, IgMs were negative. They returned to PCP in Jan 2019, and complained of persistent fever spikes, and was referred to ID. Last seen in clinic 3 weeks ago, now here for follow up, with negative initial workup including CBC with diff, CMP, ESR, Blood cultures, UA and Quantiferon TB. CRP was elevated at 16.    Patient also reported some genital soreness at last visit, with no discharge or ulcers. Last sexual encounter was reported >6 months ago, unprotected, with a female partner. We tested for HIV, Chlamydia, Gonorrhea and syphilis, all were found negative. She reported going to camp in [place] every summer, where she engaged in swimming, hiking. Also reported living near the woods in [place] in [phone]. We also checked Lyme serologies which were negative. 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 No history of exposure to homelessness or assisted recently. No recent travel other than to [place]. No sick contacts. No use of steroids or immunosuppression. No weight loss. No history of lymphoma or rheumatological disease in the family. H/o recurrent otitis media in childhood, tympanostomy tubes removed in 2002. No hearing loss, pain, discharge or other ear symptoms recently. Mononucleosis screen was done, also negative. Reported being treated for a UTI in December 2018 by an outside provider. No other antibiotic use. Question of Familial Mediterranean Fever, was raised at last visit, but no family history reported, and not seen in commercial genetic testing per mother.     Her fevers were described as occasional spikes, with sometimes weeks in between, none at this tme or last visit. Associated with chills and a  sick feeling, sometimes with sore throat, nasal congestion. When she gets the episodes, she often misses school/work. At last visit, we advised patient and father to make a log of fevers and associated symptoms to better ascertain diagnosis as fevers of unknown origin. At today's visit, patient reports chills and joint pain. Since last visit, she may have had one episode of fever per father, but unfortunately no documentation was made. Patient reports daily abdominal pain today, but per mother this is not new, and is associated with intermittent constipation, attributed to psychotropic meds.     She did call in with body aches and chills on 2/26. Per notes, when triage nurse called dad answered and reported that patient may have called because patient is having some mental health issues.     Review of Systems:  CONSTITUTIONAL:  Fevers, chills+ No night sweats  EYES: negative for icterus or acute vision changes.   ENT:  negative for hearing loss, tinnitus. Reports occasional sore throat, runny nose  RESPIRATORY:  negative for cough, sputum, dyspnea  CARDIOVASCULAR:  negative for chest pain, heart palpitations  GASTROINTESTINAL:  negative for nausea, vomiting, diarrhea, Abdominal pain, constipation+  GENITOURINARY:  negative for dysuria or hematuria.   HEME:  No easy bruising or bleeding  INTEGUMENT:  negative for rash or pruritus  NEURO:  Negative for headache or tremor.  MSK: Myalgia+    Past Medical History:   Diagnosis Date     Amblyopia, unspecified      Esotropia, unspecified      Lack of normal physiological development, unspecified     Normal chromosomes, nl MRI, neg fragile X     Redundant prepuce and phimosis     Penile coronal adhesions-repaired     Umbilical hernia without mention of obstruction or gangrene     repaired     Unspecified otitis media     Recurrent     Past Surgical History:   Procedure Laterality Date     DENTAL SURGERY      wisdom teeth removed     HC CREATE EARDRUM OPENING,GEN ANESTH   8/2000    P.E. Tubes - removed 2002     HC REPAIR, INCOMPLETE CIRCUMCISION  2000    Recircumcised     HERNIA REPAIR, UMBILICAL  2000    repair umbilical hernia     Family History   Problem Relation Age of Onset     Migraines Mother      Breast Cancer Natural parent         Mother     Social History     Social History Narrative    Environmental History    Child is around people that smoke: No     Child's home has well water: No    Child's home has filtered water:No    Child has pets in the home: Yes    Child's home/apartment was built before 1950:Yes    Child attends : Yes    Child has exposure to sibling/playmate with lead poisoning: No    Child has exposure to someone with tuberculosis: No    Child home have guns/firearms without trigger locks: No    Child home have guns/firearms with trigger locks: No    There are concerns about the child's exposure to violence in the home: No        Family Information:    Parent #1    Name: SARA Mcdonough, 12-2-63  Education:  Post grad Masters  Occupation:     Parent #2    Name: AAMIR Verma, 1-16-64  Education: Post Grad  Occupation:         Relationship Status of Parent(s):     Who does the child live with? mother, father and brother(s)    What language(s) is/are spoken at home? English     Nuria Reyes RN                             Social History     Tobacco Use     Smoking status: Never Smoker     Smokeless tobacco: Never Used   Substance Use Topics     Alcohol use: No     Drug use: No     Immunization History   Administered Date(s) Administered     Comvax (HIB/HepB) 2000, 2000, 02/16/2000     DTAP (<7y) 2000, 2000, 2000, 06/09/2000, 03/02/2004     HEPA 02/14/2007, 12/14/2007     HPV 07/24/2012, 11/20/2013, 08/13/2014     Influenza (H1N1) 01/24/2010     Influenza (IIV3) PF 02/14/2007     Influenza Intranasal Vaccine 11/15/2007, 10/07/2008     Influenza Intranasal Vaccine 4 valent 11/20/2013      Influenza Vaccine IM 3yrs+ 4 Valent IIV4 10/04/2018     MMR 06/09/2000, 03/08/2001, 03/02/2004     Meningococcal (Menactra ) 04/12/2011, 04/19/2018     OPV, trivalent, live 1999     Poliovirus, inactivated (IPV) 1999, 1999, 03/02/2004     TDAP Vaccine (Boostrix) 04/12/2011     Varicella 02/16/2000, 02/14/2007     Yellow Fever 01/19/2010     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)     Gluten-sensitive enteropathy     Gluten intolerance     Seasonal allergic rhinitis     Mild intermittent asthma without complication     Gender dysphoria, unspecified     Outpatient Medications Marked as Taking for the 3/13/19 encounter (Office Visit) with Timothy Taylor MD   Medication Sig     acetylcysteine (N-ACETYL CYSTEINE) 600 MG CAPS capsule Take one po bid     clonazePAM (KLONOPIN) 1 MG tablet 0.5 mg 2 times daily as needed Take 0.5 -1 tablet in the morning, and may repeat one additional dose q day prn     guanFACINE HCl (INTUNIV) 3 MG TB24 24 hr tablet 4 mg Take one daily for ADHD     lithium (ESKALITH) 450 MG CR tablet Take a single 450mg tablet po q hs along with a single 300mg tablet at hs     lithium (ESKALITH/LITHOBID) 300 MG CR tablet 750 mg 2 times daily Take 2 po q am and one po q hs along with 450mg dose     loratadine (CLARITIN) 10 MG capsule Take 10 mg by mouth daily     lurasidone (LATUDA) 80 MG TABS tablet Take 160 mg by mouth     melatonin 3 MG tablet Take 6-9 mg by mouth     metFORMIN (GLUCOPHAGE) 500 MG tablet Take  "500 mg by mouth 2 times daily (with meals)     polyethylene glycol (MIRALAX/GLYCOLAX) powder None Entered     QUEtiapine (SEROQUEL) 400 MG tablet Take 1 tablet (400 mg) by mouth At Bedtime Take 2 (200mg) po q hs     QUEtiapine (SEROQUEL) 50 MG tablet 50 mg as needed Take 1-2 po TID prn     Allergies   Allergen Reactions     Gluten Meal Other (See Comments)     No Known Allergies           Physical Exam:   Vitals were reviewed.  All vitals stable  /75   Pulse 83   Temp 97.8  F (36.6  C) (Oral)   Ht 1.867 m (6' 1.5\")   Wt 97.1 kg (214 lb)   SpO2 96%   BMI 27.85 kg/m    Wt Readings from Last 4 Encounters:   03/13/19 97.1 kg (214 lb)   02/20/19 95.8 kg (211 lb 3.2 oz)   01/24/19 95.7 kg (211 lb) (95 %)*   06/11/18 89.4 kg (197 lb 1.3 oz) (92 %)*     * Growth percentiles are based on CDC (Boys, 2-20 Years) data.     Exam:  GENERAL: well-developed, well-nourished, alert, oriented, in no acute distress.  HEENT: Head is normocephalic, atraumatic.   EYES: Eyes have anicteric sclerae.    LUNGS: Clear to auscultation.  CARDIOVASCULAR: Regular rate and rhythm with no murmurs, gallops or rubs.  ABDOMEN: Normal bowel sounds, soft, no visible wincing, although patient verbally reports ttp in left hypochondrium  SKIN: No acute rashes.  NEUROLOGIC: Grossly nonfocal.         Laboratory Data:     Metabolic Studies / Hepatic studies:  Last Comprehensive Metabolic Panel:  Sodium   Date Value Ref Range Status   02/20/2019 136 133 - 144 mmol/L Final     Potassium   Date Value Ref Range Status   02/20/2019 4.0 3.4 - 5.3 mmol/L Final     Chloride   Date Value Ref Range Status   02/20/2019 105 94 - 109 mmol/L Final     Carbon Dioxide   Date Value Ref Range Status   02/20/2019 27 20 - 32 mmol/L Final     Anion Gap   Date Value Ref Range Status   02/20/2019 4 3 - 14 mmol/L Final     Glucose   Date Value Ref Range Status   02/20/2019 84 70 - 99 mg/dL Final     Urea Nitrogen   Date Value Ref Range Status   02/20/2019 12 7 - 30 mg/dL " Final     Creatinine   Date Value Ref Range Status   02/20/2019 0.94 0.66 - 1.25 mg/dL Final     GFR Estimate   Date Value Ref Range Status   02/20/2019 >90 >60 mL/min/[1.73_m2] Final     Comment:     Non  GFR Calc  Starting 12/18/2018, serum creatinine based estimated GFR (eGFR) will be   calculated using the Chronic Kidney Disease Epidemiology Collaboration   (CKD-EPI) equation.       Calcium   Date Value Ref Range Status   02/20/2019 9.9 8.5 - 10.1 mg/dL Final     Pancreatitis testing    Recent Labs   Lab Test 04/30/15  0807 01/22/15  0813   TRIG 52 53     Lipid testing    Recent Labs   Lab Test 04/30/15  0807 01/22/15  0813   CHOL 147 129   HDL 70 78   LDL 67 40   TRIG 52 53   CHOLHDLRATIO 2.1 1.7     Hematology Studies   Lab Results   Component Value Date    WBC 9.5 02/20/2019     Lab Results   Component Value Date    RBC 5.02 02/20/2019     Lab Results   Component Value Date    HGB 13.9 02/20/2019     Lab Results   Component Value Date    HCT 42.6 02/20/2019     No components found for: MCT  Lab Results   Component Value Date    MCV 85 02/20/2019     Lab Results   Component Value Date    MCH 27.7 02/20/2019     Lab Results   Component Value Date    MCHC 32.6 02/20/2019     Lab Results   Component Value Date    RDW 12.6 02/20/2019     Lab Results   Component Value Date     02/20/2019     Inflammatory markers:    Sed Rate   Date Value Ref Range Status   02/20/2019 10 0 - 15 mm/h Final     CRP Inflammation   Date Value Ref Range Status   02/20/2019 16.0 (H) 0.0 - 8.0 mg/L Final     Urine studies:  UA RESULTS:  Recent Labs   Lab Test 02/20/19  1604 06/05/12  1341   COLOR Yellow Yellow   APPEARANCE Cloudy Clear   URINEGLC Negative Negative   URINEBILI Negative Negative   URINEKETONE Negative Negative   SG 1.012 <=1.005   UBLD Negative Trace*   URINEPH 6.0 6.5   PROTEIN Negative Negative   UROBILINOGEN  --  0.2   NITRITE Negative Negative   LEUKEST Negative Trace*   RBCU 0 O - 2   WBCU <1 O -  2     Thyroid Studies     Recent Labs   Lab Test 01/22/15  0813 08/12/13  1359   TSH 1.96 1.16     MARY: negatove    Microbiology:    2/20/2019:  HIV antigen/antibody: Negative  Urine Chlamydia/Gonorhhea PCRs: negative  Treponema antibody: NR  Lyme disease antibodies: Negative  Mononucleosis screen: negative  Quantiferon TB: Negative    Last Culture results with specimen source  Culture Micro   Date Value Ref Range Status   02/20/2019 No growth  Final   01/30/2018 No Beta Streptococcus isolated  Final   04/21/2010 No Beta Streptococcus isolated  Final   04/19/2010 No Beta Streptococcus isolated  Final   04/21/2009 No Beta Streptococcus isolated  Final   02/20/2009 No Beta Streptococcus isolated  Final   02/12/2009 No Beta Streptococcus isolated  Final   07/01/2008 No Beta Streptococcus isolated  Final   03/07/2008 No Beta Streptococcus isolated  Final   12/14/2007 No Beta Streptococcus isolated  Final   12/02/2006 No Beta Streptococcus isolated  Final   09/11/2006 No Beta Streptococcus isolated  Final   03/08/2006 No Beta Streptococcus isolated  Final   10/07/2003 No Beta Streptococcus isolated  Final   09/16/2003 No Beta Streptococcus isolated  Final    Specimen Description   Date Value Ref Range Status   02/20/2019 Blood Left Arm  Final   02/20/2019 Urine  Corrected     Comment:     CORRECTED ON 02/21 AT 0608: PREVIOUSLY REPORTED AS Midstream Urine   01/30/2018 Nasopharyngeal  Final   01/30/2018 Throat  Final   01/30/2018 Throat  Final   04/21/2010 Throat  Final   04/21/2010 Throat  Final   04/19/2010 Throat  Final   04/19/2010 Throat  Final   04/21/2009 Throat  Final   04/21/2009 Throat  Final   02/20/2009 Throat  Final   02/20/2009 Throat  Final   02/12/2009 Throat  Final   02/12/2009 Throat  Final   07/01/2008 Throat  Final   07/01/2008 Throat  Final   03/07/2008 Throat  Final   03/07/2008 Throat  Final   12/14/2007 Throat  Final   12/14/2007 Throat  Final   12/02/2006 Throat  Final   12/02/2006 Throat  Final    10/27/2006 Throat  Final   09/11/2006 Throat  Final   09/11/2006 Throat  Final   03/08/2006 Throat  Final   03/08/2006 Throat  Final   11/17/2004 Throat  Final   10/07/2003 Throat  Final   10/07/2003 Throat  Final   09/16/2003 Throat  Final   09/16/2003 Throat  Final   04/10/2003 Throat  Final        Respiratory virus testing    Recent Labs   Lab Test 01/30/18  1326   IRSV Negative     Recent Labs   Lab Test 01/30/18  1326 01/29/18  1631   AFLU  --  NEGATIVE   BFLU  --  NEGATIVE   INFZA Negative  --    INFZB Negative  --      CMV Antibody IgG   Date Value Ref Range Status   02/05/2018 5.9 (H) 0.0 - 0.8 AI Final     Comment:     Positive  Antibody index (AI) values reflect qualitative changes in antibody   concentration that cannot be directly associated with clinical condition or   disease state.       CMV Antibody IgM   Date Value Ref Range Status   02/05/2018 <0.2 0.0 - 0.8 AI Final     Comment:     Negative  Antibody index (AI) values reflect qualitative changes in antibody   concentration that cannot be directly associated with clinical condition or   disease state.       EBV Capsid Antibody IgG   Date Value Ref Range Status   02/05/2018 3.4 (H) 0.0 - 0.8 AI Final     Comment:     Positive, suggests recent or past exposure  Antibody index (AI) values reflect qualitative changes in antibody   concentration that cannot be directly associated with clinical condition or   disease state.       EBV Capsid Antibody IgM   Date Value Ref Range Status   02/05/2018 <0.2 0.0 - 0.8 AI Final     Comment:     No detectable antibody.  Antibody index (AI) values reflect qualitative changes in antibody   concentration that cannot be directly associated with clinical condition or   disease state.       Imaging:  Results for orders placed or performed during the hospital encounter of 01/30/18   Chest XR,  PA & LAT    Narrative    Exam:  Chest X-ray 1/30/2018 1:40 PM    History: fever- eval for infiltrate;     Comparison:  None    Findings: PA and lateral chest radiographs are obtained. Cardiac  mediastinal silhouette is within normal limits. Pulmonary vasculature  is distinct. Lungs are hyperinflated. Trachea is midline. No focal  pulmonary opacities. No pleural effusion or pneumothorax. The upper  abdomen is unremarkable. No acute bony abnormalities.       Impression    Impression:   No acute cardiopulmonary abnormalities.    I have personally reviewed the examination and initial interpretation  and I agree with the findings.    NANCI CASTILLO MD

## 2019-03-13 NOTE — LETTER
3/13/2019      RE: Remy Holloway  [address]  [facility] [id number]       [facility]  Infectious Disease Clinic Note: Established Patient     Patient:  Remy Holloway, Date of birth 2000, Medical record number [id number]  Date of Visit:  03/13/2019  Consult requested by Dr.Janine Faustin for evaluation of fevers of uncertain etiology         Assessment and Recommendations:     Recommendations:  - CT sinuses recommended, patient and family preferred to hold off for now, and consider this again at follow up visit if symptoms persist  - Recheck CRP today  - Recheck another set of blood cultures today  - Advised again to keep a log of fevers including dates and times, and temperatures before and after tylenol, as well as any associated symptoms  - Follow up in 3 months    Plan of care discussed with Staff ID Physician Dr Vicente Brady  PGY4 Infectious Diseases Fellow  Pager: [phone]    Assessment:  20 y/o with autism spectrum disorder, gender dysphoria, BPAD here for 3 week follow up of recurrent fevers for about 1 year. Prefers female pronouns and the name Seema. Intermittent spikes were described at last visit, sometimes with weeks in between temperatures. Advised patient and father to make a log of fevers and associated symptoms to better ascertain diagnosis as fevers of unknown origin. At today's visit, patient reports chills and joint pain. Per father may have had one episode of fever since last visit, but unfortunately no documentation was made. Afebrile in clinic today.    Initial workup done at last visit on 2/20/2019 included CBC with diff, CMP, ESR, Blood cultures, UA and Quantiferon TB, which were found to be negative. Patient also reported genital soreness with past history of unprotected sex, and STD testing including HIV, syphilis, gonorrhea and chlamydia were done, also found to be negative. She also had a history of going to camp in  [place] every summer, and [place] in [id number].  Lyme serologies were tested and found negative. Other tick borne illnesses are not likely given clinical course. She also described intermittent sore throat, nasal congestion, with a h/o recurrent Otitis media as a child, tympanostomy tubes removed in 2002. Mononucleosis screen was also done, and negative. Sinus imaging was discussed today, but patient and family preferred to hold off for now, and consider this again at follow up visit if symptoms persist. CRP was elevated at 16. This is a non specific finding in the absence of other evidence of infection, but would repeat to ensure not rising to alarmingly high levels. Would also repeat another set of blood cultures at this point. Patient reported abdominal pain, but per mother this is chronic, and associated with constipation, attributed to her psychiatric meds.     Per our evaluation, no infectious etiology has come to light during history, exam or labs. Other than above mentioned workup, would pursue watchful waiting at this time. Advised again to keep a log of fevers including dates and times, and temperatures before and after tylenol, as well as any associated symptoms, and recommended 3 month follow up. They preferred to call to schedule follow up after further consideration.         History of the Infectious Disease lllness:     20 y/o with autism spectrum disorder, gender dysphoria, BPAD here for follow up of recurrent fevers. Prefers female pronouns and the name Seema.     Symptoms started towards the end of January 2018. She was on a trip to [place] with her parents when she was noticed to have a fever, which lasted about a week and resolved. When they returned to [place], her fevers returned. She was seen by her PCP, who did a rapid flu test which was negative. She had some sore throat and sinus congestion at the time, along with myalgias and was treated with  Doxycycline for sinusitis. She was seen in the ER on 1/30/2018, where some posterior pharyngeal erythema was noted. No fevers were noted in the ER. Flu PCRs, strep throat, and CXR were negative. In 02/2018, they visited an IM physician with persistent fevers. Again, she was afebrile at the clinic. CBC with diff, LFTs were ordered and were negative. CMV and EBV IgGs were positive, IgMs were negative. They returned to PCP in Jan 2019, and complained of persistent fever spikes, and was referred to ID. Last seen in clinic 3 weeks ago, now here for follow up, with negative initial workup including CBC with diff, CMP, ESR, Blood cultures, UA and Quantiferon TB. CRP was elevated at 16.    Patient also reported some genital soreness at last visit, with no discharge or ulcers. Last sexual encounter was reported >6 months ago, unprotected, with a female partner. We tested for HIV, Chlamydia, Gonorrhea and syphilis, all were found negative. She reported going to camp in [place] every summer, where she engaged in swimming, hiking. Also reported living near the woods in [place] in [phone]. We also checked Lyme serologies which were negative. 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 No history of exposure to homelessness or longterm recently. No recent travel other than to [place]. No sick contacts. No use of steroids or immunosuppression. No weight loss. No history of lymphoma or rheumatological disease in the family. H/o recurrent otitis media in childhood, tympanostomy tubes removed in 2002. No hearing loss, pain, discharge or other ear symptoms recently. Mononucleosis screen was done, also negative. Reported being treated for a UTI in December 2018 by an outside provider. No other antibiotic use. Question of Familial Mediterranean Fever, was raised at last visit, but no family history reported, and not seen in commercial genetic testing per mother.     Her fevers were described as occasional spikes, with  sometimes weeks in between, none at this tme or last visit. Associated with chills and a sick feeling, sometimes with sore throat, nasal congestion. When she gets the episodes, she often misses school/work. At last visit, we advised patient and father to make a log of fevers and associated symptoms to better ascertain diagnosis as fevers of unknown origin. At today's visit, patient reports chills and joint pain. Since last visit, she may have had one episode of fever per father, but unfortunately no documentation was made. Patient reports daily abdominal pain today, but per mother this is not new, and is associated with intermittent constipation, attributed to psychotropic meds.     She did call in with body aches and chills on 2/26. Per notes, when triage nurse called dad answered and reported that patient may have called because patient is having some mental health issues.     Review of Systems:  CONSTITUTIONAL:  Fevers, chills+ No night sweats  EYES: negative for icterus or acute vision changes.   ENT:  negative for hearing loss, tinnitus. Reports occasional sore throat, runny nose  RESPIRATORY:  negative for cough, sputum, dyspnea  CARDIOVASCULAR:  negative for chest pain, heart palpitations  GASTROINTESTINAL:  negative for nausea, vomiting, diarrhea, Abdominal pain, constipation+  GENITOURINARY:  negative for dysuria or hematuria.   HEME:  No easy bruising or bleeding  INTEGUMENT:  negative for rash or pruritus  NEURO:  Negative for headache or tremor.  MSK: Myalgia+    Past Medical History:   Diagnosis Date     Amblyopia, unspecified      Esotropia, unspecified      Lack of normal physiological development, unspecified     Normal chromosomes, nl MRI, neg fragile X     Redundant prepuce and phimosis     Penile coronal adhesions-repaired     Umbilical hernia without mention of obstruction or gangrene     repaired     Unspecified otitis media     Recurrent     Past Surgical History:   Procedure Laterality Date      DENTAL SURGERY      wisdom teeth removed     HC CREATE EARDRUM OPENING,GEN ANESTH  8/2000    P.E. Tubes - removed 2002     HC REPAIR, INCOMPLETE CIRCUMCISION  2000    Recircumcised     HERNIA REPAIR, UMBILICAL  2000    repair umbilical hernia     Family History   Problem Relation Age of Onset     Migraines Mother      Breast Cancer Natural parent         Mother     Social History     Social History Narrative    Environmental History    Child is around people that smoke: No     Child's home has well water: No    Child's home has filtered water:No    Child has pets in the home: Yes    Child's home/apartment was built before 1950:Yes    Child attends : Yes    Child has exposure to sibling/playmate with lead poisoning: No    Child has exposure to someone with tuberculosis: No    Child home have guns/firearms without trigger locks: No    Child home have guns/firearms with trigger locks: No    There are concerns about the child's exposure to violence in the home: No        Family Information:    Parent #1    Name: SARA Mcdonough, 12-2-63  Education:  Post grad Masters  Occupation:     Parent #2    Name: AAMIR Verma, 1-16-64  Education: Post Grad  Occupation:         Relationship Status of Parent(s):     Who does the child live with? mother, father and brother(s)    What language(s) is/are spoken at home? English     Nuria Reyes RN                             Social History     Tobacco Use     Smoking status: Never Smoker     Smokeless tobacco: Never Used   Substance Use Topics     Alcohol use: No     Drug use: No     Immunization History   Administered Date(s) Administered     Comvax (HIB/HepB) 2000, 2000, 02/16/2000     DTAP (<7y) 2000, 2000, 2000, 06/09/2000, 03/02/2004     HEPA 02/14/2007, 12/14/2007     HPV 07/24/2012, 11/20/2013, 08/13/2014     Influenza (H1N1) 01/24/2010     Influenza (IIV3) PF 02/14/2007     Influenza Intranasal  Vaccine 11/15/2007, 10/07/2008     Influenza Intranasal Vaccine 4 valent 11/20/2013     Influenza Vaccine IM 3yrs+ 4 Valent IIV4 10/04/2018     MMR 06/09/2000, 03/08/2001, 03/02/2004     Meningococcal (Menactra ) 04/12/2011, 04/19/2018     OPV, trivalent, live 1999     Poliovirus, inactivated (IPV) 1999, 1999, 03/02/2004     TDAP Vaccine (Boostrix) 04/12/2011     Varicella 02/16/2000, 02/14/2007     Yellow Fever 01/19/2010     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)     Gluten-sensitive enteropathy     Gluten intolerance     Seasonal allergic rhinitis     Mild intermittent asthma without complication     Gender dysphoria, unspecified     Outpatient Medications Marked as Taking for the 3/13/19 encounter (Office Visit) with Timothy Taylor MD   Medication Sig     acetylcysteine (N-ACETYL CYSTEINE) 600 MG CAPS capsule Take one po bid     clonazePAM (KLONOPIN) 1 MG tablet 0.5 mg 2 times daily as needed Take 0.5 -1 tablet in the morning, and may repeat one additional dose q day prn     guanFACINE HCl (INTUNIV) 3 MG TB24 24 hr tablet 4 mg Take one daily for ADHD     lithium (ESKALITH) 450 MG CR tablet Take a single 450mg tablet po q hs along with a single 300mg tablet at hs     lithium (ESKALITH/LITHOBID) 300 MG CR tablet 750 mg 2 times daily Take 2 po q am and one po q hs along with 450mg dose     loratadine (CLARITIN) 10 MG capsule Take 10 mg by mouth daily     lurasidone (LATUDA) 80 MG TABS tablet Take 160 mg by mouth      "melatonin 3 MG tablet Take 6-9 mg by mouth     metFORMIN (GLUCOPHAGE) 500 MG tablet Take 500 mg by mouth 2 times daily (with meals)     polyethylene glycol (MIRALAX/GLYCOLAX) powder None Entered     QUEtiapine (SEROQUEL) 400 MG tablet Take 1 tablet (400 mg) by mouth At Bedtime Take 2 (200mg) po q hs     QUEtiapine (SEROQUEL) 50 MG tablet 50 mg as needed Take 1-2 po TID prn     Allergies   Allergen Reactions     Gluten Meal Other (See Comments)     No Known Allergies           Physical Exam:   Vitals were reviewed.  All vitals stable  /75   Pulse 83   Temp 97.8  F (36.6  C) (Oral)   Ht 1.867 m (6' 1.5\")   Wt 97.1 kg (214 lb)   SpO2 96%   BMI 27.85 kg/m     Wt Readings from Last 4 Encounters:   03/13/19 97.1 kg (214 lb)   02/20/19 95.8 kg (211 lb 3.2 oz)   01/24/19 95.7 kg (211 lb) (95 %)*   06/11/18 89.4 kg (197 lb 1.3 oz) (92 %)*     * Growth percentiles are based on CDC (Boys, 2-20 Years) data.     Exam:  GENERAL: well-developed, well-nourished, alert, oriented, in no acute distress.  HEENT: Head is normocephalic, atraumatic.   EYES: Eyes have anicteric sclerae.    LUNGS: Clear to auscultation.  CARDIOVASCULAR: Regular rate and rhythm with no murmurs, gallops or rubs.  ABDOMEN: Normal bowel sounds, soft, no visible wincing, although patient verbally reports ttp in left hypochondrium  SKIN: No acute rashes.  NEUROLOGIC: Grossly nonfocal.         Laboratory Data:     Metabolic Studies / Hepatic studies:  Last Comprehensive Metabolic Panel:  Sodium   Date Value Ref Range Status   02/20/2019 136 133 - 144 mmol/L Final     Potassium   Date Value Ref Range Status   02/20/2019 4.0 3.4 - 5.3 mmol/L Final     Chloride   Date Value Ref Range Status   02/20/2019 105 94 - 109 mmol/L Final     Carbon Dioxide   Date Value Ref Range Status   02/20/2019 27 20 - 32 mmol/L Final     Anion Gap   Date Value Ref Range Status   02/20/2019 4 3 - 14 mmol/L Final     Glucose   Date Value Ref Range Status   02/20/2019 84 70 - 99 " mg/dL Final     Urea Nitrogen   Date Value Ref Range Status   02/20/2019 12 7 - 30 mg/dL Final     Creatinine   Date Value Ref Range Status   02/20/2019 0.94 0.66 - 1.25 mg/dL Final     GFR Estimate   Date Value Ref Range Status   02/20/2019 >90 >60 mL/min/[1.73_m2] Final     Comment:     Non  GFR Calc  Starting 12/18/2018, serum creatinine based estimated GFR (eGFR) will be   calculated using the Chronic Kidney Disease Epidemiology Collaboration   (CKD-EPI) equation.       Calcium   Date Value Ref Range Status   02/20/2019 9.9 8.5 - 10.1 mg/dL Final     Pancreatitis testing    Recent Labs   Lab Test 04/30/15  0807 01/22/15  0813   TRIG 52 53     Lipid testing    Recent Labs   Lab Test 04/30/15  0807 01/22/15  0813   CHOL 147 129   HDL 70 78   LDL 67 40   TRIG 52 53   CHOLHDLRATIO 2.1 1.7     Hematology Studies   Lab Results   Component Value Date    WBC 9.5 02/20/2019     Lab Results   Component Value Date    RBC 5.02 02/20/2019     Lab Results   Component Value Date    HGB 13.9 02/20/2019     Lab Results   Component Value Date    HCT 42.6 02/20/2019     No components found for: MCT  Lab Results   Component Value Date    MCV 85 02/20/2019     Lab Results   Component Value Date    MCH 27.7 02/20/2019     Lab Results   Component Value Date    MCHC 32.6 02/20/2019     Lab Results   Component Value Date    RDW 12.6 02/20/2019     Lab Results   Component Value Date     02/20/2019     Inflammatory markers:    Sed Rate   Date Value Ref Range Status   02/20/2019 10 0 - 15 mm/h Final     CRP Inflammation   Date Value Ref Range Status   02/20/2019 16.0 (H) 0.0 - 8.0 mg/L Final     Urine studies:  UA RESULTS:  Recent Labs   Lab Test 02/20/19  1604 06/05/12  1341   COLOR Yellow Yellow   APPEARANCE Cloudy Clear   URINEGLC Negative Negative   URINEBILI Negative Negative   URINEKETONE Negative Negative   SG 1.012 <=1.005   UBLD Negative Trace*   URINEPH 6.0 6.5   PROTEIN Negative Negative   UROBILINOGEN  --   0.2   NITRITE Negative Negative   LEUKEST Negative Trace*   RBCU 0 O - 2   WBCU <1 O - 2     Thyroid Studies     Recent Labs   Lab Test 01/22/15  0813 08/12/13  1359   TSH 1.96 1.16     MARY: negatove    Microbiology:    2/20/2019:  HIV antigen/antibody: Negative  Urine Chlamydia/Gonorhhea PCRs: negative  Treponema antibody: NR  Lyme disease antibodies: Negative  Mononucleosis screen: negative  Quantiferon TB: Negative    Last Culture results with specimen source  Culture Micro   Date Value Ref Range Status   02/20/2019 No growth  Final   01/30/2018 No Beta Streptococcus isolated  Final   04/21/2010 No Beta Streptococcus isolated  Final   04/19/2010 No Beta Streptococcus isolated  Final   04/21/2009 No Beta Streptococcus isolated  Final   02/20/2009 No Beta Streptococcus isolated  Final   02/12/2009 No Beta Streptococcus isolated  Final   07/01/2008 No Beta Streptococcus isolated  Final   03/07/2008 No Beta Streptococcus isolated  Final   12/14/2007 No Beta Streptococcus isolated  Final   12/02/2006 No Beta Streptococcus isolated  Final   09/11/2006 No Beta Streptococcus isolated  Final   03/08/2006 No Beta Streptococcus isolated  Final   10/07/2003 No Beta Streptococcus isolated  Final   09/16/2003 No Beta Streptococcus isolated  Final    Specimen Description   Date Value Ref Range Status   02/20/2019 Blood Left Arm  Final   02/20/2019 Urine  Corrected     Comment:     CORRECTED ON 02/21 AT 0608: PREVIOUSLY REPORTED AS Midstream Urine   01/30/2018 Nasopharyngeal  Final   01/30/2018 Throat  Final   01/30/2018 Throat  Final   04/21/2010 Throat  Final   04/21/2010 Throat  Final   04/19/2010 Throat  Final   04/19/2010 Throat  Final   04/21/2009 Throat  Final   04/21/2009 Throat  Final   02/20/2009 Throat  Final   02/20/2009 Throat  Final   02/12/2009 Throat  Final   02/12/2009 Throat  Final   07/01/2008 Throat  Final   07/01/2008 Throat  Final   03/07/2008 Throat  Final   03/07/2008 Throat  Final   12/14/2007 Throat   Final   12/14/2007 Throat  Final   12/02/2006 Throat  Final   12/02/2006 Throat  Final   10/27/2006 Throat  Final   09/11/2006 Throat  Final   09/11/2006 Throat  Final   03/08/2006 Throat  Final   03/08/2006 Throat  Final   11/17/2004 Throat  Final   10/07/2003 Throat  Final   10/07/2003 Throat  Final   09/16/2003 Throat  Final   09/16/2003 Throat  Final   04/10/2003 Throat  Final        Respiratory virus testing    Recent Labs   Lab Test 01/30/18  1326   IRSV Negative     Recent Labs   Lab Test 01/30/18  1326 01/29/18  1631   AFLU  --  NEGATIVE   BFLU  --  NEGATIVE   INFZA Negative  --    INFZB Negative  --      CMV Antibody IgG   Date Value Ref Range Status   02/05/2018 5.9 (H) 0.0 - 0.8 AI Final     Comment:     Positive  Antibody index (AI) values reflect qualitative changes in antibody   concentration that cannot be directly associated with clinical condition or   disease state.       CMV Antibody IgM   Date Value Ref Range Status   02/05/2018 <0.2 0.0 - 0.8 AI Final     Comment:     Negative  Antibody index (AI) values reflect qualitative changes in antibody   concentration that cannot be directly associated with clinical condition or   disease state.       EBV Capsid Antibody IgG   Date Value Ref Range Status   02/05/2018 3.4 (H) 0.0 - 0.8 AI Final     Comment:     Positive, suggests recent or past exposure  Antibody index (AI) values reflect qualitative changes in antibody   concentration that cannot be directly associated with clinical condition or   disease state.       EBV Capsid Antibody IgM   Date Value Ref Range Status   02/05/2018 <0.2 0.0 - 0.8 AI Final     Comment:     No detectable antibody.  Antibody index (AI) values reflect qualitative changes in antibody   concentration that cannot be directly associated with clinical condition or   disease state.       Imaging:  Results for orders placed or performed during the hospital encounter of 01/30/18   Chest XR,  PA & LAT    Narrative    Exam:  Chest  X-ray 1/30/2018 1:40 PM    History: fever- eval for infiltrate;     Comparison: None    Findings: PA and lateral chest radiographs are obtained. Cardiac  mediastinal silhouette is within normal limits. Pulmonary vasculature  is distinct. Lungs are hyperinflated. Trachea is midline. No focal  pulmonary opacities. No pleural effusion or pneumothorax. The upper  abdomen is unremarkable. No acute bony abnormalities.       Impression    Impression:   No acute cardiopulmonary abnormalities.    I have personally reviewed the examination and initial interpretation  and I agree with the findings.    NANCI CASTILLO MD         Infectious Disease Clinic Staff Note: Ms. Holloway was seen, examined, and the case was discussed with Dr. Taylor, ID Fellow -- I agree with her interval history and examination, assessment and plan in this outpatient ID Progress note. This note reflects my observations and opinions and the plan outlined fully reflects my approach. I have reviewed the available history, radiology, laboratory results, and reports with the Fellow.    Timothy Taylor MD

## 2019-03-13 NOTE — NURSING NOTE
Chief Complaint   Patient presents with     RECHECK     fevers o f unknown etiology     Fredis Amin MA

## 2019-03-19 LAB
BACTERIA SPEC CULT: NO GROWTH
Lab: NORMAL
SPECIMEN SOURCE: NORMAL

## 2019-03-23 NOTE — PROGRESS NOTES
Infectious Disease Clinic Staff Note: Ms. Holloway was seen, examined, and the case was discussed with Dr. Taylor, ID Fellow -- I agree with her interval history and examination, assessment and plan in this outpatient ID Progress note. This note reflects my observations and opinions and the plan outlined fully reflects my approach. I have reviewed the available history, radiology, laboratory results, and reports with the Fellow.

## 2019-03-25 ENCOUNTER — OFFICE VISIT (OUTPATIENT)
Dept: OTHER | Facility: OUTPATIENT CENTER | Age: 19
End: 2019-03-25
Payer: COMMERCIAL

## 2019-03-25 DIAGNOSIS — F84.0 AUTISM SPECTRUM DISORDER: ICD-10-CM

## 2019-03-25 DIAGNOSIS — F64.0 GENDER DYSPHORIA IN ADOLESCENT AND ADULT: Primary | ICD-10-CM

## 2019-03-25 NOTE — PROGRESS NOTES
"[facility] -  Case Progress Note    3/25/19  Client Name: Remy Stephenson she/her/hers  YOB: 2000  MRN:  [id number]  Treating Provider: Cornelia Avendano PsyD  Type of Session: individual  Present in Session: client, client's father, client's mother  Number of Minutes:  55    Current Symptoms/Status:  Client describes incongruence between her birth assigned sex and her experienced gender. Client describes self as a \"gender fluid female\"; noting distress in reaction to being treated as and referred to as male. Client describes body dysphoria regarding some primary and secondary sex characteristics. Client describes preference for flexibility in her gender expression.    Client's parents are legal guardians. Client is a neurodiverse individual (ASD, ADHD).    Progress Toward Treatment Goals:   Attending family session with parents and provider.    Intervention and Response:  CBT, relational, and interpersonal approach to developing rapport and clarifying goals for treatment. Began session engaging client, father, and mother in an exploratory exercise identifying preferred name and pronouns coupled by a general check-in of family status. Client identified name and pronouns. Client shared narrative around feeling triggered at work and efforts to ask for support from supervisor when feeling frustrated. Parents checked in and shared positive experience with client attending [employer]'s [employer] hockey championship game; noting that they would not have experienced it if client had not introduced them to women's hockey.     Met independently with client. Relational, exploratory, and psychoeducational approach to exploring gender. Took time to explore client's understanding of birth assigned sex, and gender identity. Client was able to differentiate between the 2 components and identify where they fall on the identity spectrum for each. Took time to answer questions and clarify " concepts throughout. Encouraged client take note of questions, thoughts, or ideas that emerge between sessions. Took time to check in with parents at end of session and summarize the content of session. Client agreed prior to checking in with clients to sharing what was discussed independently. Validated, reflected, and normalized client's thoughts and feelings throughout session. Client was open, responsive, and engaged throughout session.      Assignment:  none    Interactive Complexity:  1. The need to manage maladaptive communication (difficulties with receptive and expressive language processing) among participants that complicates delivery of care.    Diagnosis:  302.85 Gender Dysphoria  ASD, ADHD, Bipolar I, anxiety (by history)    Plan / Need for Future Services:  Return for therapy in 2 weeks.      Cornelia Avendano PsyD

## 2019-04-30 ENCOUNTER — OFFICE VISIT (OUTPATIENT)
Dept: OTHER | Facility: OUTPATIENT CENTER | Age: 19
End: 2019-04-30
Payer: COMMERCIAL

## 2019-04-30 DIAGNOSIS — F64.0 GENDER DYSPHORIA IN ADOLESCENT AND ADULT: Primary | ICD-10-CM

## 2019-04-30 DIAGNOSIS — F84.0 AUTISM SPECTRUM DISORDER: ICD-10-CM

## 2019-04-30 NOTE — PROGRESS NOTES
"[facility] -  Case Progress Note    4/30/19  Client Name: Remy Stephenson she/her/hers  YOB: 2000  MRN:  [id number]  Treating Provider: Cornelia Avendano PsyD  Type of Session: individual  Present in Session: client; client's father, client's mother (check-in)  Number of Minutes:  55    Current Symptoms/Status:  Client describes incongruence between her birth assigned sex and her experienced gender. Client describes self as a \"gender fluid female\"; noting distress in reaction to being treated as and referred to as male. Client describes body dysphoria regarding some primary and secondary sex characteristics. Client describes preference for flexibility in her gender expression.    Client's parents are legal guardians. Client is a neurodiverse individual (ASD, ADHD).    Progress Toward Treatment Goals:   See Jaylin Samson weekly for individual and group therapy.     Intervention and Response:  CBT, relational, and interpersonal approach to developing rapport and clarifying goals for treatment. Began session engaging client in an exploratory exercise identifying preferred name and pronouns coupled by a general check-in of family status. Client identified name and pronouns. Client shared narrative around feeling triggered dissatisfied with body. Explored discomfort with body. Client noted desire for breasts and \"a vagina.\" Further themes emerged of body dysphoria increasing when client is misgendered. Themes emerged of feelings towards former PCA, who client felt was being unprofessional with client. Explored the impact of PCA misgendering client on client's reaction to PCA. Client recognized some impact but more so lack of respect of client's boundaries. Confirmed that client felt like they had space for processing issue with PCA. Client noted plans to discuss with Jaylin Samson. Returned to explore client's understanding of gender expression. Provided psychoeducation re " the diversity of response to gender expression, particularly body dysphoria. Discussed the importance of caring for and honoring the body in order to keep body intact if changes in the future are desired. Client noted confidence in capacity to keep body safe. Planned to return to further explore client's noted body discomfort. Validated, reflected, and normalized client's thoughts and feelings throughout session. Client was open, responsive, and engaged throughout session.      Assignment:  none    Interactive Complexity:  1. The need to manage maladaptive communication (difficulties with receptive and expressive language processing) among participants that complicates delivery of care.    Diagnosis:  302.85 Gender Dysphoria  ASD, ADHD, Bipolar I, anxiety (by history)    Plan / Need for Future Services:  Return for therapy in 2 weeks.      Cornelia Avendano PsyD

## 2019-05-10 ENCOUNTER — TELEPHONE (OUTPATIENT)
Dept: INFECTIOUS DISEASES | Facility: CLINIC | Age: 19
End: 2019-05-10

## 2019-08-05 ENCOUNTER — OFFICE VISIT (OUTPATIENT)
Dept: FAMILY MEDICINE | Facility: CLINIC | Age: 19
End: 2019-08-05
Payer: COMMERCIAL

## 2019-08-05 VITALS
SYSTOLIC BLOOD PRESSURE: 102 MMHG | BODY MASS INDEX: 26.75 KG/M2 | HEIGHT: 72 IN | HEART RATE: 66 BPM | DIASTOLIC BLOOD PRESSURE: 69 MMHG | TEMPERATURE: 97.5 F | OXYGEN SATURATION: 97 % | WEIGHT: 197.5 LBS

## 2019-08-05 DIAGNOSIS — Z02.89 PHYSICAL EXAM FOR CAMP: Primary | ICD-10-CM

## 2019-08-05 RX ORDER — POLYETHYLENE GLYCOL 3350 17 G/17G
1 POWDER, FOR SOLUTION ORAL DAILY
Qty: 1 BOTTLE | COMMUNITY
Start: 2019-08-05 | End: 2021-12-07

## 2019-08-05 RX ORDER — GUANFACINE 4 MG/1
TABLET, EXTENDED RELEASE ORAL
Qty: 30 TABLET | Refills: 0 | COMMUNITY
Start: 2019-08-05 | End: 2021-05-17

## 2019-08-05 RX ORDER — HYDROXYZINE HYDROCHLORIDE 25 MG/1
TABLET, FILM COATED ORAL
Qty: 30 TABLET | Refills: 3 | COMMUNITY
Start: 2019-08-05 | End: 2019-12-03

## 2019-08-05 RX ORDER — LANOLIN ALCOHOL/MO/W.PET/CERES
3 CREAM (GRAM) TOPICAL
Qty: 30 TABLET | Refills: 0 | COMMUNITY
Start: 2019-08-05

## 2019-08-05 ASSESSMENT — MIFFLIN-ST. JEOR: SCORE: 1940.23

## 2019-08-05 NOTE — PROGRESS NOTES
"Remy \"Seema\"Jewel is a 19 year old patient with gender dysphoria. Seema prefers they/them pronouns. Seema has a history of autism, anxiety, bipolar disorder, and ADHD. They are here with their mom for the following:    Physical exam for camp  Seema will be attending a camp for autistic indivduals in [facility] this December. They bring in a form that needs to be completed. All immunizations are up to date. The form asks for a hemoglobin level and urine analysis. These were both completed in February and were normal.      Seema is followed by a psychiatrist at [facility] and also follows with a therapist regularly. Mood has actually been fairly good lately. Sleep is somewhat difficult but improving.    PMH, PSH, FH, medications, allergies and immunizations are updated this visit.    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)     Gluten-sensitive enteropathy     Gluten intolerance     Seasonal allergic rhinitis     Mild intermittent asthma without complication     Gender dysphoria, unspecified       Current Outpatient Medications   Medication Sig Dispense Refill     acetylcysteine (N-ACETYL CYSTEINE) 600 MG CAPS capsule Take one po bid 60 capsule 3     clonazePAM (KLONOPIN) 1 MG tablet 0.5 mg 2 times daily as needed Take 0.5 -1 tablet in the morning, and may repeat one additional dose q day prn 90 tablet 1     guanFACINE HCl (INTUNIV) 4 MG TB24 Take one daily for ADHD 30 tablet 0     hydrOXYzine (ATARAX) 25 MG tablet Take one po q hs prn " insomnia 30 tablet 3     lithium (ESKALITH) 450 MG CR tablet Take a single 450mg tablet po q hs along with a single 300mg tablet at hs 90 tablet 3     lithium (ESKALITH/LITHOBID) 300 MG CR tablet 750 mg 2 times daily Take 2 po q am and one po q hs along with 450mg dose 90 tablet 1     loratadine (CLARITIN) 10 MG capsule Take 10 mg by mouth daily 30 capsule 3     lurasidone (LATUDA) 80 MG TABS tablet Take 160 mg by mouth       melatonin 3 MG tablet Take one po q hs 30 tablet 0     metFORMIN (GLUCOPHAGE) 1000 MG tablet Take 1 tablet (1,000 mg) by mouth 2 times daily (with meals)       polyethylene glycol (MIRALAX/GLYCOLAX) powder Take 17 g (1 capful) by mouth daily PRN constipation 1 Bottle      QUEtiapine (SEROQUEL) 400 MG tablet Take 1 tablet (400 mg) by mouth At Bedtime Take 2 (200mg) po q hs 30 tablet 3     QUEtiapine (SEROQUEL) 50 MG tablet 50 mg as needed Take 1-2 po TID prn 120 tablet 1     sodium chloride (OCEAN) 0.65 % nasal spray Spray 2 sprays into both nostrils daily as needed for congestion (Patient not taking: Reported on 2/20/2019) 30 mL 0       Allergies   Allergen Reactions     Gluten Meal Other (See Comments)     No Known Allergies         ROS  CONSTITUTIONAL: NEGATIVE for current illness. Concerned weight loss of 17 pounds  EYES: NEGATIVE for diplopia, eye pain and photophobia. Wears glasses  ENT/MOUTH: hx of seasonal allergies, stable  RESP: NEGATIVE for cough, wheezing or SOB  CV: reports occasional chest pain with anxiety attacks, none with exertion  GI: recent loose stools, now with constipation after use of imodium  : NEGATIVE for Dysuria, Frequency and Hematuria  MUSCULOSKELETAL: recent strain of right quad muscles with running, improving.  INTEGUMENTARY/SKIN: NEGATIVE for new lesions and pruritis  NEURO: NEGATIVE fordizziness/lightheadedness, gait disturbance, memory problems and paresthesias , no headaches  ENDOCRINE: NEGATIVE for cold intolerance and heat intolerance  HEME/ALLERGY/IMMUNE:  "NEGATIVE for bleeding or clotting disorders  PSYCHIATRIC: Mood currently stable, no significant outbursts, hx of anxiety and depression, ADHD. Sees psychiatry and counselor      EXAM  /69   Pulse 66   Temp 97.5  F (36.4  C) (Oral)   Ht 1.823 m (5' 11.77\")   Wt 89.6 kg (197 lb 8 oz)   SpO2 97%   BMI 26.96 kg/m    Gen: Alert, pleasant, NAD, Overweight  HEENT:  Conjunctiva nl, TM normal bilaterally, OP clear, no posterior erythema  COR: S1,S2, no murmur  Lungs: CTA bilaterally, no rhonchi, wheezes or rales  Abdomen: Soft, non tender, normal bowel sounds, no HSM or mass  Ext: no peripheral edema, pulses full  Neuro: CN 2-12 grossly intact  DTR +2/4 in all extremities      Assessment:  (Z02.89) Physical exam for camp  (primary encounter diagnosis)  Comment: attending camp for autistic individuals in [place] in December 2019  Plan: hydrOXYzine (ATARAX) 25 MG tablet        May participate in camp, no restrictions other than dietary, no dairy or gluten. Immunizations are up to date. Typhoid vaccine only needed in [place] but he is not traveling to these areas.  Form is completed today.     Lana Faustin MD  Internal Medicine/Pediatrics        "

## 2019-08-05 NOTE — NURSING NOTE
"19 year old  Chief Complaint   Patient presents with     Forms       Blood pressure 102/69, pulse 66, temperature 97.5  F (36.4  C), temperature source Oral, height 1.823 m (5' 11.77\"), weight 89.6 kg (197 lb 8 oz), SpO2 97 %. Body mass index is 26.96 kg/m .  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)     Gluten-sensitive enteropathy     Gluten intolerance     Seasonal allergic rhinitis     Mild intermittent asthma without complication     Gender dysphoria, unspecified       Wt Readings from Last 2 Encounters:   08/05/19 89.6 kg (197 lb 8 oz)   03/13/19 97.1 kg (214 lb)     BP Readings from Last 3 Encounters:   08/05/19 102/69   03/13/19 116/75   02/20/19 125/84         Current Outpatient Medications   Medication     acetylcysteine (N-ACETYL CYSTEINE) 600 MG CAPS capsule     clonazePAM (KLONOPIN) 1 MG tablet     guanFACINE HCl (INTUNIV) 3 MG TB24 24 hr tablet     hydrOXYzine (ATARAX) 25 MG tablet     lithium (ESKALITH) 450 MG CR tablet     lithium (ESKALITH/LITHOBID) 300 MG CR tablet     loratadine (CLARITIN) 10 MG capsule     lurasidone (LATUDA) 80 MG TABS tablet     melatonin 3 MG tablet     metFORMIN (GLUCOPHAGE) 500 MG tablet     mirtazapine (REMERON) 7.5 MG TABS tablet     polyethylene glycol (MIRALAX/GLYCOLAX) powder     QUEtiapine (SEROQUEL) 400 MG tablet     QUEtiapine (SEROQUEL) 50 MG tablet     sodium chloride (OCEAN) 0.65 % nasal spray     No current facility-administered medications for this visit.        Social History     Tobacco " Use     Smoking status: Never Smoker     Smokeless tobacco: Never Used   Substance Use Topics     Alcohol use: No     Drug use: No       Health Maintenance Due   Topic Date Due     ASTHMA CONTROL TEST  2000     ASTHMA ACTION PLAN  08/12/2014       No results found for: PAP      August 5, 2019 3:15 PM

## 2019-09-05 ENCOUNTER — OFFICE VISIT (OUTPATIENT)
Dept: OTHER | Facility: OUTPATIENT CENTER | Age: 19
End: 2019-09-05
Payer: COMMERCIAL

## 2019-09-05 DIAGNOSIS — F64.0 GENDER DYSPHORIA IN ADOLESCENT AND ADULT: Primary | ICD-10-CM

## 2019-09-05 NOTE — PROGRESS NOTES
"[facility] -  Case Progress Note    9/05/19  Client Name: Remy Stephenson she/her/hers  YOB: 2000  MRN:  [id number]  Treating Provider: Cornelia Avendano PsyD  Type of Session: individual  Present in Session: client; client's father (check-in)  Number of Minutes:  55    Current Symptoms/Status:  Client describes incongruence between her birth assigned sex and her experienced gender. Client describes self as a \"gender fluid female\"; noting distress in reaction to being treated as and referred to as male. Client describes body dysphoria regarding some primary and secondary sex characteristics. Client describes preference for flexibility in her gender expression.    Client's parents are legal guardians. Client is a neurodiverse individual (ASD, ADHD).    Progress Toward Treatment Goals:   See aJylin Samson weekly for individual and group therapy. Began transitional school - [employer] through [facility] - August 2019.  - [employer] - [employer]ico[employer] Cook. Ines Gill .    Intervention and Response:  Treatment Plan: 1/25/2020    CBT, relational, and interpersonal approach to exploring current status. Began session engaging client and father in general check-in of family status. Father and client shared efforts to begin college transitional program to take steps towards dream of being a . Client noted feeling more \"suicidal\"; father noted pattern of client \"talking about [suicidal thought]\" when feeling discouraged. When meeting with client independently, assessed SI further. Client was able to articulate a pattern of passive SI in response to feeling negative judgment from others. Explored client's awareness of what helps to shift them away from passive SI. Client shared examples of exercise and avoidance of caffeine as helpful. Normalized client's fear of judgment of beginning college later than some peers. Explored " client's perspective of how teacher and classmates are responding to their gender identity. Client noted efforts to address gender identity with teacher in a private way; noting supportive response. Client noted intention to be discreet with teacher for her own protection. Client noted avoidance of disclosing to classmates due to just getting to know classmates. Explored client's thinking about when disclosure of gender identity would be important. Client noted openness to disclosing if someone questioned their gender identity; noting no sense of urgency to disclose to their entire class. Theme of GCS emerged. Normalized the range of experience of comfortability with body of transgender folx. Explored client's experience. Client noted discomfort with genitals and ideal outcome of GCS. Client was able to process recommendation from psychiatrist that they avoid GCS due to their mood disorder and need to be continuously medicated. Clarified if body discomfort is a topic that client would be comfortable including parents in. Client noted openness to discussing topic with parents. Validated, reflected, and normalized client's thoughts and feelings throughout session. Client was open, responsive, and engaged throughout session.      Assignment:  none    Interactive Complexity:  1. none    Diagnosis:  302.85 Gender Dysphoria  ASD, ADHD, Bipolar I, anxiety (by history)    Plan / Need for Future Services:  Return for therapy in 2 weeks.      Cornelia Avendano PsyD

## 2019-10-01 ENCOUNTER — HOSPITAL ENCOUNTER (OUTPATIENT)
Dept: BEHAVIORAL HEALTH | Facility: CLINIC | Age: 19
Discharge: HOME | End: 2019-10-01
Attending: PSYCHIATRY & NEUROLOGY | Admitting: PSYCHIATRY & NEUROLOGY
Payer: COMMERCIAL

## 2019-10-01 PROCEDURE — 90791 PSYCH DIAGNOSTIC EVALUATION: CPT | Performed by: COUNSELOR

## 2019-10-01 ASSESSMENT — PAIN SCALES - GENERAL: PAINLEVEL: MODERATE PAIN (4)

## 2019-10-01 NOTE — PROGRESS NOTES
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  children. Pt states an older half-sister and an older brother.  describes her childhood as \"tough dealing with mental health concerns, supportive parents, tough in a way with emotions.  She states at 15yrs old identifying as transgender, and father was not the most supportive.\"  Pt reports she had difficulty with aggression as a adolescent.\"  Pt states identifying her sexual orientation as queer.  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    They reports having personal strengths as generous, kind, inclusive, caring, \"Mentorship award in kindness\" in [place].   They would benefit from a Day treatment group for structure, stability and decrease in anxiety and depressive symptoms.  They, guardians, and writer discussed she could possibly attend Day Tx on \"2 week trial\" and see if it is a good fit. They would also benefit from ongoing individual therapy ([facility]) and therapist Pebbles Mena Mn, Psychiatrist-Dr Reina Brink MD, [facility], and Case Management--[employer].        Prognosis is Good. " Without the recommended intervention, the client is likely to experience the following consequences of their symptoms:   increase in symptoms, decrease in functioning, worsening safety concerns, need for higher level of care.      Referrals to services required by statute or rule:   Report to child/adult protection services was NA.   Referral to another professional/service is not indicated at this time..    Program Recommendation: Day Treatment (DT) .   Group 2A  Start date 10/3/2019    Assessment Completed by: KAILASH Cotter

## 2019-10-03 ENCOUNTER — HEALTH MAINTENANCE LETTER (OUTPATIENT)
Age: 19
End: 2019-10-03

## 2019-10-03 ENCOUNTER — HOSPITAL ENCOUNTER (OUTPATIENT)
Dept: BEHAVIORAL HEALTH | Facility: CLINIC | Age: 19
End: 2019-10-03
Attending: PSYCHIATRY & NEUROLOGY | Admitting: PSYCHIATRY & NEUROLOGY
Payer: COMMERCIAL

## 2019-10-03 DIAGNOSIS — F31.9 BIPOLAR 1 DISORDER (H): ICD-10-CM

## 2019-10-03 PROCEDURE — G0177 OPPS/PHP; TRAIN & EDUC SERV: HCPCS

## 2019-10-03 PROCEDURE — 90853 GROUP PSYCHOTHERAPY: CPT

## 2019-10-03 ASSESSMENT — ANXIETY QUESTIONNAIRES
GAD7 TOTAL SCORE: 14
7. FEELING AFRAID AS IF SOMETHING AWFUL MIGHT HAPPEN: MORE THAN HALF THE DAYS
3. WORRYING TOO MUCH ABOUT DIFFERENT THINGS: MORE THAN HALF THE DAYS
1. FEELING NERVOUS, ANXIOUS, OR ON EDGE: MORE THAN HALF THE DAYS
5. BEING SO RESTLESS THAT IT IS HARD TO SIT STILL: MORE THAN HALF THE DAYS
2. NOT BEING ABLE TO STOP OR CONTROL WORRYING: MORE THAN HALF THE DAYS
6. BECOMING EASILY ANNOYED OR IRRITABLE: MORE THAN HALF THE DAYS
IF YOU CHECKED OFF ANY PROBLEMS ON THIS QUESTIONNAIRE, HOW DIFFICULT HAVE THESE PROBLEMS MADE IT FOR YOU TO DO YOUR WORK, TAKE CARE OF THINGS AT HOME, OR GET ALONG WITH OTHER PEOPLE: VERY DIFFICULT

## 2019-10-03 ASSESSMENT — PATIENT HEALTH QUESTIONNAIRE - PHQ9
SUM OF ALL RESPONSES TO PHQ QUESTIONS 1-9: 14
5. POOR APPETITE OR OVEREATING: MORE THAN HALF THE DAYS

## 2019-10-03 NOTE — PROGRESS NOTES
"Adult Mental Health Outpatient Group Therapy Progress Note         Client Initial Individualized Goals for Treatment: \"coping skills for anger.\"      Transgender: SHE or they/them : preferred pronouns  [facility]    Psychiatrist Dr Reina Rose MD, [facility].   -Nicolette Cook @ [employer]   Therapist: Jaylin Samson In [place]  PCP:  Dr Lana Faustin @ [facility]    Psychiatric Diagnosis:    Autism Spectrum Disorder 299.00(F84.0)  Associated with another neurodevelopmental, mental or behavioral disorder, Attention-Deficit/Hyperactivity Disorder  314.01 (F90.9) Unspecified Attention -Deficit / Hyperactivity Disorder and Specific Learning Disorder 315.2 (F81.81) With impairment in written expression grammar and punctuation accuracy  296.43 Bipolar I Disorder Current or Most Recent Episode Manic, Severe   300.00 (F41.9) Unspecified Anxiety Disorder  302.85 (F64.1) Gender dysphoria in Adolescents and Adults  With a disorder of sex development    Initial Treatment suggestions for the client during the time between Diagnostic Assessment and completion of the Individualized Treatment Plan:  Abstain from Substance Use   Ask for more information, support and/or assistance as needed.  Follow up with providers/community supports as needed: TREY Salas, [facility]  Report increases or changes in symptoms to staff.  Report any personal safety concerns to staff.   Take medications as prescribed.  Report medication changes and/or side effects to staff.  Attend and participate in groups as scheduled or notify staff if unable to do so.  Report any use of substances to staff as this may impact your symptoms and/or personal safety.  Notify staff if you have any other issues that need to be addressed. This may include any current abuse / neglect / exploitation or other vulnerability.  Follow recommendations of your treatment team and discuss concerns if not in agreement.     Area of Treatment " "Focus:  Symptom Management, Personal Safety and Community Resources/Discharge Planning     Therapeutic Interventions/Treatment Strategies:  Support, Feedback, Safety Assessments and Cognitive Behavioral Therapy     Response to Treatment Strategies:  Listened and Focused on Goals     Name of Group:  Group Psychotherapy,  9:00 - 9:50 am     10/3/2019     Group Participants: 9      Description and Outcome:   \"Seema\" reported being safe today.  She reported that sleep is good, that she talks with others, and her mother is a support, takes her medications as prescribed, and has calm thoughts, now.  She reported that she felt there was some john symptoms of overspending and an elevated mood, but not racing thoughts.     She reported that she enjoys running, listening to music, watching sports on TV, and drinking coffee.              Client participated in a mindfulness exercise.  Client will benefit from further practice of skills from group therapy.    Client  demonstrated understanding of session through his participation.     Is this a Weekly Review of the Progress on the Treatment Plan?  Yes.       Are Treatment Plan Goals being addressed?  Yes, continue treatment goals        Are Treatment Plan Strategies to Address Goals Effective?  Yes, continue treatment strategies        Are there any current contracts in place?  No    "

## 2019-10-03 NOTE — ADDENDUM NOTE
Encounter addended by: Concepcion Carrillo PsyD on: 10/3/2019 5:28 PM   Actions taken: Order Reconciliation Section accessed

## 2019-10-03 NOTE — ADDENDUM NOTE
Encounter addended by: Bryan Gerber, OT on: 10/3/2019 12:03 PM   Actions taken: Charge Capture section accepted

## 2019-10-03 NOTE — ADDENDUM NOTE
Encounter addended by: Lopez Gray, OTR/L on: 10/3/2019 12:47 PM   Actions taken: Flowsheet accepted, Clinical Note Signed, Charge Capture section accepted

## 2019-10-03 NOTE — PROGRESS NOTES
"Adult Mental Health Day Treatment    PATIENT'S NAME: Remy Holloway  MRN:   [id number]  :   2000  ACCT. NUMBER: [id number]  DATE OF SERVICE: 10/03/19  START TIME: 1100  END TIME: 1150    NUMBER OF PARTICIPANTS: 8      Summary of Group / Topics Discussed:  Resiliency Development: Self Esteem and Self Compassion: Perception: Patients were given the opportunity to reflect and identified the positive aspects of their life.  Patients were taught about the importance of self kindness on mental health wellbeing.  Patients were also given the opportunity to improve self efficacy, self sufficiency, quality of life and a sense of mastery and competency by identifying positive aspects of their life and to instill hope.      Patient Session Goals / Objectives:    Identified personal strengths and qualities about themselves as a way to provide hope and build self-compassion as a way to effectively manage both mental health and substance abuse/abuse symptoms     Improved awareness of positive qualities and how this contribute to the process of recovery and mental health wellbeing and resiliency      Established a plan for practice of these skills in their own environments    Practiced and reflected on how to generalize taught skills to their everyday life    Patient Participation / Response:  Moderately participated, sharing some personal reflections / insights and adequately adequately received / provided feedback with other participants.    Patient presentation: Calm,alert with fair to good focus and concentration. Mood and thought process stable. Seema reports diagnosis of Bipolar at age 14. Autism spectrum has been life long. Reports being in a program called [facility] in [place] was very helpful particularly the DBT.He has struggled to make social relationships even with others whom have had autism spectrum disorder. [employer] and interest in volunteering for [employer]. A change he wants to make " is improving his relationship with his father. He scored an 11/30 on a mental health recovery scale, Verbalized understanding of content and Patient would benefit from additional opportunities to practice the content to be able to generalize it to their everyday life with increased intentionality, consistency, and efficacy in support of their psychiatric recovery    Treatment Plan:  Patient has an initial individualized treatment plan that was created as part of their diagnostic assessment / admission process.  A master individualized treatment plan is in the process of being developed with the patient and multi-disciplinary care team.

## 2019-10-04 ASSESSMENT — ANXIETY QUESTIONNAIRES: GAD7 TOTAL SCORE: 14

## 2019-10-04 NOTE — PROGRESS NOTES
Adult Mental Health Day Treatment    PATIENT'S NAME: Remy Holloway  MRN:   [id number]  :   2000  ACCT. NUMBER: [id number]  DATE OF SERVICE: 10/03/19  START TIME: 10:00  END TIME: 10:50    NUMBER OF PARTICIPANTS: 9 (2nd staff present)      Summary of Group / Topics Discussed:  Resiliency Development: Coping Skills: Aftercare Coping Cards: Patients were taught how to begin to develop a relapse management kit for both mental and substance use/abuse by creating individualized coping cards.    Patient Session Goals / Objectives:    Identified individualized coping skills they can use for effectively managing both mental health and substance abuse/abuse symptoms     Improved awareness of different types of coping skills and how to personalize them to their unique situation and circumstances      Established a plan for practice of these skills in their own environments    Practiced and reflected on how to generalize taught skills to their everyday life    Patient Participation / Response:  Fully participated with the group by sharing personal reflections / insights and openly received / provided feedback with other participants.    Verbalized understanding of content and Patient would benefit from additional opportunities to practice the content to be able to generalize it to their everyday life with increased intentionality, consistency, and efficacy in support of their psychiatric recovery    Treatment Plan:  Patient has an initial individualized treatment plan that was created as part of their diagnostic assessment / admission process.  A master individualized treatment plan is in the process of being developed with the patient and multi-disciplinary care team.

## 2019-10-07 ENCOUNTER — HOSPITAL ENCOUNTER (OUTPATIENT)
Dept: BEHAVIORAL HEALTH | Facility: CLINIC | Age: 19
End: 2019-10-07
Attending: PSYCHIATRY & NEUROLOGY
Payer: COMMERCIAL

## 2019-10-07 PROCEDURE — 90853 GROUP PSYCHOTHERAPY: CPT | Performed by: PSYCHOLOGIST

## 2019-10-07 PROCEDURE — G0177 OPPS/PHP; TRAIN & EDUC SERV: HCPCS

## 2019-10-07 NOTE — PROGRESS NOTES
Adult Mental Health Day Treatment    PATIENT'S NAME: Remy Holloway  MRN:   [id number]  :   2000  ACCT. NUMBER: [id number]  DATE OF SERVICE: 10/07/19  START TIME: 0900  END TIME: 0950    NUMBER OF PARTICIPANTS: 7      Summary of Group / Topics Discussed:  Health Maintenance: Wellness Check-in: Patients met with group facilitator to individually review a holistic wellness check-in to assess patient medication adherence/concerns, appointments, physical and mental health, exercise, nutrition, sleep, socialization, substance use, and need for service/resource referrals.       Patient Session Goals / Objectives:    Discussed various aspects of health management and self-care related to physical and mental health    Demonstrated increased self-awareness of current wellness needs    Developed health literacy skills in navigating the healthcare system and self-advocacy/communicating needs with health care team    Patient Participation / Response:  Fully participated with the group by sharing personal reflections / insights and openly received / provided feedback with other participants.    Demonstrated understanding of topics discussed through group discussion and participation    Treatment Plan:  Patient has a current master individualized treatment plan.  See Epic treatment plan for more information.

## 2019-10-07 NOTE — ADDENDUM NOTE
Encounter addended by: Concepcion Carrillo PsyD on: 10/7/2019 2:24 PM   Actions taken: Charge Capture section accepted

## 2019-10-07 NOTE — PROGRESS NOTES
"Adult Mental Health Outpatient Group Therapy Progress Note         Client Initial Individualized Goals for Treatment: \"coping skills for anger.\"       Transgender: SHE or they/them : preferred pronouns  [facility]     Psychiatrist Dr Reina Rose MD, [facility].   -Nicolette Cook @ [employer]   Therapist: Jaylin Samson In [place]  PCP:  Dr Lana Faustin @ [facility]     Psychiatric Diagnosis:    Autism Spectrum Disorder 299.00(F84.0)  Associated with another neurodevelopmental, mental or behavioral disorder, Attention-Deficit/Hyperactivity Disorder  314.01 (F90.9) Unspecified Attention -Deficit / Hyperactivity Disorder and Specific Learning Disorder 315.2 (F81.81) With impairment in written expression grammar and punctuation accuracy  296.43 Bipolar I Disorder Current or Most Recent Episode Manic, Severe   300.00 (F41.9) Unspecified Anxiety Disorder  302.85 (F64.1) Gender dysphoria in Adolescents and Adults  With a disorder of sex development     Initial Treatment suggestions for the client during the time between Diagnostic Assessment and completion of the Individualized Treatment Plan:  Abstain from Substance Use   Ask for more information, support and/or assistance as needed.  Follow up with providers/community supports as needed: TREY Salas, [facility]  Report increases or changes in symptoms to staff.  Report any personal safety concerns to staff.   Take medications as prescribed.  Report medication changes and/or side effects to staff.  Attend and participate in groups as scheduled or notify staff if unable to do so.  Report any use of substances to staff as this may impact your symptoms and/or personal safety.  Notify staff if you have any other issues that need to be addressed. This may include any current abuse / neglect / exploitation or other vulnerability.  Follow recommendations of your treatment team and discuss concerns if not in agreement.     Area of " "Treatment Focus:  Symptom Management, Personal Safety and Community Resources/Discharge Planning     Therapeutic Interventions/Treatment Strategies:  Support, Feedback, Safety Assessments and Cognitive Behavioral Therapy     Response to Treatment Strategies:  Listened and Focused on Goals     Name of Group:  Group Psychotherapy, 10:00 - 10:50 am  11:00 - 11.50 am    10/7/2019       Group Participants: 96      Description and Outcome:              \"Seema\" reported being safe today.  She reported that sleep is good, that she talks with others, her mother is a support, takes her medications as prescribed, and has calm thoughts, now.  She reported using many distraction skills and activities to manage depressed and negative thoughts, and talked with the group about how she doesn't have any friends.  The group discussed how to maintain relationships.  She reported going to the [employer] and considering if she will join a young adult group there.   She reported that she felt there was some john symptoms of overspending and an elevated mood, but not racing thoughts. She talked with another member about john.               She reported that she enjoys running, listening to music, watching sports on TV, and drinking coffee.              11:00 - 11.50 am  \   The group participated in a structured activity that involved reading a worksheet about grief and loss.  The group discussed their thoughts and feelings about the process and shared their experiences with others.  The group wrote out examples from their lives about how they felt it had affected them.  The group discussed their different stories and those that were similar.  The group validated others who had experienced distress.     Client participated in a mindfulness exercise.  Client will benefit from further practice of skills from group therapy.     Client  demonstrated understanding of session through his participation.     Is this a Weekly Review of the " Progress on the Treatment Plan?  Yes.       Are Treatment Plan Goals being addressed?  Yes, continue treatment goals        Are Treatment Plan Strategies to Address Goals Effective?  Yes, continue treatment strategies        Are there any current contracts in place?  No

## 2019-10-08 ENCOUNTER — HOSPITAL ENCOUNTER (OUTPATIENT)
Dept: BEHAVIORAL HEALTH | Facility: CLINIC | Age: 19
End: 2019-10-08
Attending: PSYCHIATRY & NEUROLOGY
Payer: COMMERCIAL

## 2019-10-08 PROCEDURE — 90853 GROUP PSYCHOTHERAPY: CPT

## 2019-10-08 PROCEDURE — G0177 OPPS/PHP; TRAIN & EDUC SERV: HCPCS

## 2019-10-08 NOTE — ADDENDUM NOTE
Encounter addended by: Lopez Gray, OTR/L on: 10/8/2019 2:17 PM   Actions taken: Flowsheet accepted, Clinical Note Signed, Charge Capture section accepted

## 2019-10-08 NOTE — PROGRESS NOTES
Adult Mental Health Day Treatment  TRACK: 6B    PATIENT'S NAME: Remy Holloway  MRN:   [id number]  :   2000  ACCT. NUMBER: [id number]  DATE OF SERVICE: 10/08/19  START TIME: 0900  END TIME: 0950    NUMBER OF PARTICIPANTS: 7      Summary of Group / Topics Discussed:  Resiliency Development: Self Awareness and Self Expression: Sense of Self: Patients explored and identified their strengths, emotions, barriers, skills, and behavior patterns that occur when changes happen in life.  Focus was on recognizing these aspects and developing ways to help support moving forward, making changes as needed to support resiliency.      Patient Session Goals / Objectives:    Identified emotions, barriers, skills, strengths, and behavior patterns that occur when changes happen in life and how to effectively cope     Improved awareness of the process of change and skills and strategies that support this       Established a plan for practice of these skills in their own environments    Practiced and reflected on how to generalize taught skills to their everyday life    Patient Participation / Response:  Minimally participated, only when prompted / asked.    Patient presentation: Calm,alert with only fair focus and concentration.Some social interaction with a peer., Verbalized understanding of content and Patient would benefit from additional opportunities to practice the content to be able to generalize it to their everyday life with increased intentionality, consistency, and efficacy in support of their psychiatric recovery    Treatment Plan:  Patient has a current master individualized treatment plan.  See Epic treatment plan for more information.

## 2019-10-08 NOTE — PROGRESS NOTES
Adult Mental Health Day Treatment  TRACK: 2A    PATIENT'S NAME: Remy Holloway  MRN:   [id number]  :   2000  ACCT. NUMBER: [id number]  DATE OF SERVICE: 10/08/19  START TIME: 1000  END TIME: 1050    NUMBER OF PARTICIPANTS: 7      Summary of Group / Topics Discussed:  Medication Education and Management: Medication Jeopardy: Patients provided education regarding medication safety, antidepressants, side effects, neuroleptics, expected medication outcomes, knowledge of diagnosis, symptoms, and symptom management through an engaging jeopardy-style format.     Patient Session Goals / Objectives:    ? Participated in team-based [email or web address] game  ? Identified strategies for safe use, handling, and disposal of medications  ? Discussed basic aspects of medication safety, side effects, adverse outcomes and contraindications     Patient Participation / Response:  Fully participated with the group by sharing personal reflections / insights and openly received / provided feedback with other participants.     Demonstrated understanding of topics discussed through group discussion and participation    Treatment Plan:  Patient has a current master individualized treatment plan.  See Epic treatment plan for more information.

## 2019-10-08 NOTE — PROGRESS NOTES
"Adult Mental Health Outpatient Group Therapy Progress Note         Client Initial Individualized Goals for Treatment: \"coping skills for anger.\"       Transgender: SHE or they/them : preferred pronouns  [facility]     Psychiatrist Dr Reina Rose MD, [facility].   -Nicolette Cook @ [employer]   Therapist: Jaylin Samson In [place]  PCP:  Dr Lana Faustin @ [facility]     Psychiatric Diagnosis:    Autism Spectrum Disorder 299.00(F84.0)  Associated with another neurodevelopmental, mental or behavioral disorder, Attention-Deficit/Hyperactivity Disorder  314.01 (F90.9) Unspecified Attention -Deficit / Hyperactivity Disorder and Specific Learning Disorder 315.2 (F81.81) With impairment in written expression grammar and punctuation accuracy  296.43 Bipolar I Disorder Current or Most Recent Episode Manic, Severe   300.00 (F41.9) Unspecified Anxiety Disorder  302.85 (F64.1) Gender dysphoria in Adolescents and Adults  With a disorder of sex development     Initial Treatment suggestions for the client during the time between Diagnostic Assessment and completion of the Individualized Treatment Plan:  Abstain from Substance Use   Ask for more information, support and/or assistance as needed.  Follow up with providers/community supports as needed: TREY Salas, [facility]  Report increases or changes in symptoms to staff.  Report any personal safety concerns to staff.   Take medications as prescribed.  Report medication changes and/or side effects to staff.  Attend and participate in groups as scheduled or notify staff if unable to do so.  Report any use of substances to staff as this may impact your symptoms and/or personal safety.  Notify staff if you have any other issues that need to be addressed. This may include any current abuse / neglect / exploitation or other vulnerability.  Follow recommendations of your treatment team and discuss concerns if not in agreement.     Area of " "Treatment Focus:  Symptom Management, Personal Safety and Community Resources/Discharge Planning     Therapeutic Interventions/Treatment Strategies:  Support, Feedback, Safety Assessments and Cognitive Behavioral Therapy     Response to Treatment Strategies:  Listened and Focused on Goals     Name of Group:  Group Psychotherapy, 10:00 - 10:50 am  11:00 - 11.50 am     10/8/2019        Group Participants: 7      Description and Outcome:              \"Seema\" reported being safe today. They reported that sleep is good, that they talk with others, mom is a support, takes medications as prescribed, and has calm thoughts, now.  they reported observing the holy [employer] [place]ah and using many distraction skills and activities to manage depressed and negative thoughts.  They reported more control over managing negative thoughts. The group discussed how to maintain relationships.  They reported going to the [employer] and considering if she will join a young adult group there.                     They reported enjoying running, listening to music, watching sports on TV, and drinking coffee.  they reported that a sibling is coming home on Thanksgiving and that person is a support, and will see an aunt on the weekend, and that their mom is in the [place] marathon on the weekend.            Client participated in a mindfulness exercise.  Client will benefit from further practice of skills from group therapy.     Client  demonstrated understanding of session through his participation.     Is this a Weekly Review of the Progress on the Treatment Plan?  Yes.       Are Treatment Plan Goals being addressed?  Yes, continue treatment goals        Are Treatment Plan Strategies to Address Goals Effective?  Yes, continue treatment strategies        Are there any current contracts in place?  No    "

## 2019-10-08 NOTE — ADDENDUM NOTE
Encounter addended by: Lopez Gray, OTR/L on: 10/8/2019 2:39 PM   Actions taken: Clinical Note Signed

## 2019-10-10 ENCOUNTER — HOSPITAL ENCOUNTER (OUTPATIENT)
Dept: BEHAVIORAL HEALTH | Facility: CLINIC | Age: 19
End: 2019-10-10
Attending: PSYCHIATRY & NEUROLOGY
Payer: COMMERCIAL

## 2019-10-10 PROCEDURE — 90853 GROUP PSYCHOTHERAPY: CPT | Performed by: PSYCHOLOGIST

## 2019-10-10 PROCEDURE — G0177 OPPS/PHP; TRAIN & EDUC SERV: HCPCS

## 2019-10-10 NOTE — PROGRESS NOTES
Adult Mental Health Day Treatment  TRACK: 2A    PATIENT'S NAME: Remy Holloway  MRN:   [id number]  :   2000  ACCT. NUMBER: [id number]  DATE OF SERVICE: 10/10/19  START TIME: 10:00  END TIME: 10:50    NUMBER OF PARTICIPANTS: 7      Summary of Group / Topics Discussed:  Sensory Approaches in Mental Health: Sensory Enhanced Mindfulness: Grounding. Patients were taught and provided with an opportunity to explore and practice how using sensory enhanced mindfulness practices can help them stay grounded in the present moment as a way to manage mental health symptoms and stressors.         Patient Session Goals / Objectives:    Identified how using sensory enhanced mindfulness practices can be used for grounding, stress management, and self regulation      Improved awareness of different types of sensory enhanced mindfulness activities that assist with healthy coping of stress and symptoms      Established a plan for practice of these skills in their own environments    Practiced and reflected on how to generalize taught skills to their everyday life    Patient Participation / Response:  Moderately participated, sharing some personal reflections / insights and adequately adequately received / provided feedback with other participants.    Verbalized understanding of content and Patient would benefit from additional opportunities to practice the content to be able to generalize it to their everyday life with increased intentionality, consistency, and efficacy in support of their psychiatric recovery    Seema worked towards ITP goal: learn, practice, generalize 2 sensory modulation or sensorimotor mindfulness based self regulation skills for improved ability to stay in the present moment and distress tolerance.    Treatment Plan:  Patient has a current master individualized treatment plan.  See Epic treatment plan for more information.

## 2019-10-10 NOTE — PROGRESS NOTES
Adult Mental Health Day Treatment  TRACK: 2A    PATIENT'S NAME: Remy Holloway  MRN:   [id number]  :   2000  ACCT. NUMBER: [id number]  DATE OF SERVICE: 10/10/19  START TIME: 1100  END TIME: 1150    NUMBER OF PARTICIPANTS: 7      Summary of Group / Topics Discussed:  Cognitive Functioning:Executive Skills and Effective Use of Time Patients were taught and provided with an opportunity to gain awareness of how their mental health symptoms impact their current cognitive functioning as well as how this impacts their performance and participation in meaningful roles, relationships, and routines.  Patients were taught skills and strategies on how to monitor and improve cognitive performance through remediation or compensatory strategies.  Patients were given opportunities to practice taught skills and techniques in session and how to apply to everyday life.        Patient Session Goals / Objectives:    Identified how their mental health symptoms impact their functioning, focusing on specific cognitive challenges     Improved awareness of specific remediation and/or compensatory strategies to improve  executive functioning skills and how this relates to mental health recovery        Established a plan for practice of these skills in their own environments    Practiced and reflected on how to generalize taught skills to their everyday life    Patient Participation / Response:  Moderately participated, sharing some personal reflections / insights and adequately adequately received / provided feedback with other participants.    Patient presentation: Calm,alert with only fair focus and concentration. Thought process clear,goal directed and reality based, Verbalized understanding of content, Patient would benefit from additional opportunities to practice the content to be able to generalize it to their everyday life with increased intentionality, consistency, and efficacy in support of their psychiatric recovery and  Patient made progress towards meeting ITP goal number Life Skills- Self Confidence(not exercising as much over the past several weeks because his  has been busy. Generally he likes to swim and exercise at the [facility]. Social Activities ( Has a social activity planned this coming Saturday)    Treatment Plan:  Patient has a current master individualized treatment plan.  See Epic treatment plan for more information.

## 2019-10-10 NOTE — ADDENDUM NOTE
Encounter addended by: Bryan Gerber, OT on: 10/10/2019 3:38 PM   Actions taken: Flowsheet accepted, Clinical Note Signed, Charge Capture section accepted

## 2019-10-10 NOTE — PROGRESS NOTES
"Adult Mental Health Outpatient Group Therapy Progress Note         Client Initial Individualized Goals for Treatment: \"coping skills for anger.\"       Transgender: SHE or they/them : preferred pronouns  [facility]     Psychiatrist Dr Reina Rose MD, [facility].   -Nicolette Cook @ [employer]   Therapist: Jaylin Samson In [place]  PCP:  Dr Lana Faustin @ [facility]     Psychiatric Diagnosis:    Autism Spectrum Disorder 299.00(F84.0)  Associated with another neurodevelopmental, mental or behavioral disorder, Attention-Deficit/Hyperactivity Disorder  314.01 (F90.9) Unspecified Attention -Deficit / Hyperactivity Disorder and Specific Learning Disorder 315.2 (F81.81) With impairment in written expression grammar and punctuation accuracy  296.43 Bipolar I Disorder Current or Most Recent Episode Manic, Severe   300.00 (F41.9) Unspecified Anxiety Disorder  302.85 (F64.1) Gender dysphoria in Adolescents and Adults  With a disorder of sex development     Initial Treatment suggestions for the client during the time between Diagnostic Assessment and completion of the Individualized Treatment Plan:  Abstain from Substance Use   Ask for more information, support and/or assistance as needed.  Follow up with providers/community supports as needed: TREY Salas, [facility]  Report increases or changes in symptoms to staff.  Report any personal safety concerns to staff.   Take medications as prescribed.  Report medication changes and/or side effects to staff.  Attend and participate in groups as scheduled or notify staff if unable to do so.  Report any use of substances to staff as this may impact your symptoms and/or personal safety.  Notify staff if you have any other issues that need to be addressed. This may include any current abuse / neglect / exploitation or other vulnerability.  Follow recommendations of your treatment team and discuss concerns if not in agreement.     Area of " "Treatment Focus:  Symptom Management, Personal Safety and Community Resources/Discharge Planning     Therapeutic Interventions/Treatment Strategies:  Support, Feedback, Safety Assessments and Cognitive Behavioral Therapy     Response to Treatment Strategies:  Listened and Focused on Goals     Name of Group:  Group Psychotherapy, 9:00 - 9:50 am    10/10/2019        Group Participants: 7      Description and Outcome:              \"Seema\" reported being safe today. They reported that sleep is good, that they talk with others, mom is a support, takes medications as prescribed, and has calm thoughts, now.  they reported observing the holy [employer] [place] and using many distraction skills and activities to manage depressed and negative thoughts.  They reported more control over managing negative thoughts. The group discussed how to maintain relationships.  They reported going to the [employer] and considering if she will join a young adult group there.   They reported reaching out to old friends to go for a run and to do activities.  They also talked about using gratitude in daily life.                 They reported enjoying running, listening to music, watching sports on TV, and drinking coffee.  they reported that a sibling is coming home on Thanksgiving and that person is a support, and will see an aunt on the weekend, and that their mom is in the [place] marathon on the weekend.             Client participated in a mindfulness exercise.  Client will benefit from further practice of skills from group therapy.     Client  demonstrated understanding of session through his participation.     Is this a Weekly Review of the Progress on the Treatment Plan?  Yes.       Are Treatment Plan Goals being addressed?  Yes, continue treatment goals        Are Treatment Plan Strategies to Address Goals Effective?  Yes, continue treatment strategies        Are there any current contracts in place?  No    "

## 2019-10-11 ENCOUNTER — OFFICE VISIT (OUTPATIENT)
Dept: OTHER | Facility: OUTPATIENT CENTER | Age: 19
End: 2019-10-11
Payer: COMMERCIAL

## 2019-10-11 DIAGNOSIS — F64.0 GENDER DYSPHORIA IN ADOLESCENT AND ADULT: Primary | ICD-10-CM

## 2019-10-11 DIAGNOSIS — F84.0 AUTISM SPECTRUM DISORDER: ICD-10-CM

## 2019-10-11 NOTE — PROGRESS NOTES
"[facility] -  Case Progress Note    10/11/19  Client Name: Remy Stephenson she/her/hers  YOB: 2000  MRN:  [id number]  Treating Provider: Cornelia Avendano PsyD  Type of Session: individual  Present in Session: client; med student observing (client gave verbal consent)  Number of Minutes:  55    Current Symptoms/Status:  Client describes incongruence between her birth assigned sex and her experienced gender. Client describes self as a \"gender fluid female\"; noting distress in reaction to being treated as and referred to as male. Client describes body dysphoria regarding some primary and secondary sex characteristics. Client describes preference for flexibility in her gender expression.    Client's parents are legal guardians. Client is a neurodiverse individual (ASD, ADHD).    Progress Toward Treatment Goals:   See Jaylin Samson weekly for individual and group therapy. Began transitional school - [employer] through [facility] - August 2019.  - Sawyer Patrick - Nicolette Cook. Ines Gill .    Intervention and Response:  Treatment Plan: 1/25/2020    CBT, relational, and interpersonal approach to exploring current status. Explored client's current state using SUDS (0-10). Client rated current state at 5-6; noting looking forward to hockey day [place]. Explored coping. Client shared positive response to day treatment; noting feeling appreciated in the group. Client shared narrative of discontinuing college courses and contentment around decision. Client requested to take a bathroom break mid-session - provider directed them to bathroom and provided instructions on how to return to clinic - client noted confidence in finding way back. Explored response to presenting gender identity in day treatment. Client noted that group members were \"making fun of\" their name; noting that another group member stuck up for her. Explored " client's preferences for how they present their gender in various environments. Client noted awareness of how some environments feel safer than others in asserting her name and pronouns. Explored with whom and in what environments she feels comfortable asserting her identity. Validated, reflected, and normalized client's thoughts and feelings throughout session. Client was open, responsive, and engaged throughout session.      Assignment:  none    Interactive Complexity:  1. none    Diagnosis:  302.85 Gender Dysphoria  ASD, ADHD, Bipolar I, anxiety (by history)    Plan / Need for Future Services:  Return for therapy in 2 weeks.      Cornelia Avendano PsyD

## 2019-10-14 ENCOUNTER — HOSPITAL ENCOUNTER (OUTPATIENT)
Dept: BEHAVIORAL HEALTH | Facility: CLINIC | Age: 19
End: 2019-10-14
Attending: PSYCHIATRY & NEUROLOGY
Payer: COMMERCIAL

## 2019-10-14 PROCEDURE — 90853 GROUP PSYCHOTHERAPY: CPT | Performed by: PSYCHOLOGIST

## 2019-10-14 PROCEDURE — G0177 OPPS/PHP; TRAIN & EDUC SERV: HCPCS

## 2019-10-14 NOTE — PROGRESS NOTES
"Adult Mental Health Outpatient Group Therapy Progress Note         Client Initial Individualized Goals for Treatment: \"coping skills for anger.\"       Transgender: SHE or they/them : preferred pronouns  [facility]     Psychiatrist Dr Reina Rose MD, [facility].   -Nicolette Cook @ [employer]   Therapist: Jaylin Samson In [place]  PCP:  Dr Lana Faustin @ [facility]     Psychiatric Diagnosis:    Autism Spectrum Disorder 299.00(F84.0)  Associated with another neurodevelopmental, mental or behavioral disorder, Attention-Deficit/Hyperactivity Disorder  314.01 (F90.9) Unspecified Attention -Deficit / Hyperactivity Disorder and Specific Learning Disorder 315.2 (F81.81) With impairment in written expression grammar and punctuation accuracy  296.43 Bipolar I Disorder Current or Most Recent Episode Manic, Severe   300.00 (F41.9) Unspecified Anxiety Disorder  302.85 (F64.1) Gender dysphoria in Adolescents and Adults  With a disorder of sex development     Initial Treatment suggestions for the client during the time between Diagnostic Assessment and completion of the Individualized Treatment Plan:  Abstain from Substance Use   Ask for more information, support and/or assistance as needed.  Follow up with providers/community supports as needed: TREY Salas, [facility]  Report increases or changes in symptoms to staff.  Report any personal safety concerns to staff.   Take medications as prescribed.  Report medication changes and/or side effects to staff.  Attend and participate in groups as scheduled or notify staff if unable to do so.  Report any use of substances to staff as this may impact your symptoms and/or personal safety.  Notify staff if you have any other issues that need to be addressed. This may include any current abuse / neglect / exploitation or other vulnerability.  Follow recommendations of your treatment team and discuss concerns if not in agreement.     Area of " "Treatment Focus:  Symptom Management, Personal Safety and Community Resources/Discharge Planning     Therapeutic Interventions/Treatment Strategies:  Support, Feedback, Safety Assessments and Cognitive Behavioral Therapy     Response to Treatment Strategies:  Listened and Focused on Goals     Name of Group:  Group Psychotherapy, 9:00 - 9:50 am     10/14/2019        Group Participants: 5      Description and Outcome:              \"Seema\" reported being safe today, and had no thoughts of harming others.  Seema did talk with the group about thoughts to harm others, and stated that they had no intent or plan to do anything and that there was no one person that was a target with intent to harm. They talked with the group about interests and how to manage difficult thoughts and feelings. Seema reported that sometimes the thoughts come with john.  The group did a thought journal example and went through step by step how to identify how feelings can accompany thoughts, which thought distortions were involved, and what resulted in the reaction to the thoughts.  He reported that his aunt was helpful.  He stated that he felt better after talking about the thoughts and feelings.   They reported that sleep is good, that they talk with others, mom is a support, takes medications as prescribed, and has calm thoughts, now.  They reported [facility] the [employer] [facility] and using many distraction skills and activities to manage depressed and negative thoughts.  They reported more control over managing negative thoughts. The group discussed how to maintain relationships.  They reported going to the [employer] and considering if she will join a young adult group there.      Angella also talked about helping the [employer] raise money.              They reported reaching out to old friends to go for a run and to do activities.  They also talked about using gratitude in daily life.                 They reported enjoying running, " listening to music, watching sports on TV, and drinking coffee.  they reported that a sibling is coming home on Thanksgiving and that person is a support, and   an aunt stayed with Angella on the weekend, and that the parents are in the [place], and their mom completed the marathon on the weekend.          10:00 - 10:50 am     The group participated in a structured activity that involved reading a worksheet about mindfulness and thought journals.  The group discussed their thoughts and feelings about the process and shared their experiences with others.  The group wrote out examples from their lives about how they felt it had affected them.  The group discussed their different stories and those that were similar.  The group validated others who had experienced distress.        Client participated in a mindfulness exercise.  Client will benefit from further practice of skills from group therapy.     Client  demonstrated understanding of session through his participation.     Is this a Weekly Review of the Progress on the Treatment Plan?  Yes.       Are Treatment Plan Goals being addressed?  Yes, continue treatment goals        Are Treatment Plan Strategies to Address Goals Effective?  Yes, continue treatment strategies        Are there any current contracts in place?  No

## 2019-10-14 NOTE — ADDENDUM NOTE
Encounter addended by: Dian Robles RN on: 10/14/2019 3:36 PM   Actions taken: Clinical Note Signed, Flowsheet accepted, Charge Capture section accepted

## 2019-10-14 NOTE — PROGRESS NOTES
Adult Mental Health Day Treatment  TRACK: 2A    PATIENT'S NAME: Remy Holloway  MRN:   [id number]  :   2000  ACCT. NUMBER: [id number]  DATE OF SERVICE: 10/14/19  START TIME: 11am  END TIME: 11:30am    NUMBER OF PARTICIPANTS: 5      Summary of Group / Topics Discussed:  Health Maintenance: Wellness Check-in: Patients met with group facilitator to individually review a holistic wellness check-in to assess patient medication adherence/concerns, appointments, physical and mental health, exercise, nutrition, sleep, socialization, substance use, and need for service/resource referrals.       Patient Session Goals / Objectives:    Discussed various aspects of health management and self-care related to physical and mental health    Demonstrated increased self-awareness of current wellness needs    Developed health literacy skills in navigating the healthcare system and self-advocacy/communicating needs with health care team    Patient Participation / Response:  Fully participated with the group by sharing personal reflections / insights and openly received / provided feedback with other participants.    Demonstrated understanding of topics discussed through group discussion and participation    Treatment Plan:  Patient has a current master individualized treatment plan.  See Epic treatment plan for more information.

## 2019-10-15 ENCOUNTER — HOSPITAL ENCOUNTER (OUTPATIENT)
Dept: BEHAVIORAL HEALTH | Facility: CLINIC | Age: 19
End: 2019-10-15
Attending: PSYCHIATRY & NEUROLOGY
Payer: COMMERCIAL

## 2019-10-15 PROCEDURE — G0177 OPPS/PHP; TRAIN & EDUC SERV: HCPCS

## 2019-10-15 PROCEDURE — 90853 GROUP PSYCHOTHERAPY: CPT | Performed by: PSYCHOLOGIST

## 2019-10-15 PROCEDURE — 90853 GROUP PSYCHOTHERAPY: CPT

## 2019-10-15 NOTE — ADDENDUM NOTE
Encounter addended by: Zoie Raza, LICSW on: 10/15/2019 1:26 PM   Actions taken: Charge Capture section accepted

## 2019-10-15 NOTE — PROGRESS NOTES
"Adult Mental Health Outpatient Group Therapy Progress Note         Client Initial Individualized Goals for Treatment: \"coping skills for anger.\"       Transgender: SHE or they/them : preferred pronouns  [facility]     Psychiatrist Dr Reina Rose MD, [facility].   -Nicolette Cook @ [employer]   Therapist: Jaylin Samson In [place]  PCP:  Dr Lana Faustin @ [facility]     Psychiatric Diagnosis:    Autism Spectrum Disorder 299.00(F84.0)  Associated with another neurodevelopmental, mental or behavioral disorder, Attention-Deficit/Hyperactivity Disorder  314.01 (F90.9) Unspecified Attention -Deficit / Hyperactivity Disorder and Specific Learning Disorder 315.2 (F81.81) With impairment in written expression grammar and punctuation accuracy  296.43 Bipolar I Disorder Current or Most Recent Episode Manic, Severe   300.00 (F41.9) Unspecified Anxiety Disorder  302.85 (F64.1) Gender dysphoria in Adolescents and Adults  With a disorder of sex development     Initial Treatment suggestions for the client during the time between Diagnostic Assessment and completion of the Individualized Treatment Plan:  Abstain from Substance Use   Ask for more information, support and/or assistance as needed.  Follow up with providers/community supports as needed: TREY Salas, [facility]  Report increases or changes in symptoms to staff.  Report any personal safety concerns to staff.   Take medications as prescribed.  Report medication changes and/or side effects to staff.  Attend and participate in groups as scheduled or notify staff if unable to do so.  Report any use of substances to staff as this may impact your symptoms and/or personal safety.  Notify staff if you have any other issues that need to be addressed. This may include any current abuse / neglect / exploitation or other vulnerability.  Follow recommendations of your treatment team and discuss concerns if not in agreement.     Area of " "Treatment Focus:  Symptom Management, Personal Safety and Community Resources/Discharge Planning     Therapeutic Interventions/Treatment Strategies:  Support, Feedback, Safety Assessments and Cognitive Behavioral Therapy     Response to Treatment Strategies:  Listened and Focused on Goals     Name of Group:  Group Psychotherapy, 9:00 - 9:50 am     10/15/2019        Group Participants: 5      Description and Outcome:              \"Seema\" reported being safe today, and had no thoughts of harming others.  Seema did talk with the group about thoughts to harm others, and stated that they had no intent or plan to do anything and that there was no one person that was a target with intent to harm. They talked with the group about interests and how to manage difficult thoughts and feelings.  The group discussed feelings and how thoughts can come from difficult feelings.    Seema reported feeling less stressed today and that the homicidal thoughts were gone. Seema reported that sometimes the thoughts come with john.  The group did a thought journal example and went through step by step how to identify how feelings can accompany thoughts, which thought distortions were involved, and what resulted in the reaction to the thoughts.                  They reported that sleep is good, that they talk with others, mom is a support, takes medications as prescribed, and has calm thoughts, now.  They reported [facility] and using many distraction skills and activities to manage depressed and negative thoughts.  They reported more control over managing negative thoughts. The group discussed how to maintain relationships.  They reported going to the [employer] and considering if she will join a young adult group there.                 Angella also talked about helping the [employer] raise money.  He also talked about how enjoyable running is and participating in races is fun.              They reported reaching " out to old friends to go for a run and to do activities.  They also talked about using gratitude in daily life.                     Client participated in a mindfulness exercise.  Client will benefit from further practice of skills from group therapy.     Client  demonstrated understanding of session through his participation.     Is this a Weekly Review of the Progress on the Treatment Plan?  Yes.       Are Treatment Plan Goals being addressed?  Yes, continue treatment goals        Are Treatment Plan Strategies to Address Goals Effective?  Yes, continue treatment strategies        Are there any current contracts in place?  No

## 2019-10-15 NOTE — PROGRESS NOTES
Adult Mental Health Day Treatment  TRACK: 2A    PATIENT'S NAME: Remy Holloway  MRN:   [id number]  :   2000  ACCT. NUMBER: [id number]  DATE OF SERVICE: 10/15/19  START TIME: 0900  END TIME: 0950    NUMBER OF PARTICIPANTS: 5      Summary of Group / Topics Discussed:  Communication and Social Skills Development: creating a Support Network with [facility] [facility] and Community Support programs(CSP): Patients completed a social support self assessment to identify people who are supportive and to evaluate the effectiveness of their social support system.  Patients were taught and gained awareness of characteristics of an effective support and how to develop this in their lives.  Patients identified both personal strengths and opportunities for growth in this areas to improve overall communication and connection with other people.    Patient Session Goals / Objectives:    Identified strengths and opportunities for growth in developing their social support system and how this impacts their ability to connect and communicate with other people       Improved awareness of important aspects of social support systems and how this relates to mental health recovery        Established a plan for practice of these skills in their own environments    Practiced and reflected on how to generalize taught skills to their everyday life    Patient Participation / Response:  Moderately participated, sharing some personal reflections / insights and adequately adequately received / provided feedback with other participants.    Patient presentation: Calm.alert with fair focus and concentration. Seema reported that she is on a wait list for supportive housing through an agency called [employer]., Verbalized understanding of content and Patient would benefit from additional opportunities to practice the content to be able to generalize it to their everyday life with increased intentionality, consistency, and efficacy in support  of their psychiatric recovery      Treatment Plan:  Patient has a current master individualized treatment plan.  See Epic treatment plan for more information.

## 2019-10-15 NOTE — PROGRESS NOTES
Adult Mental Health Day Treatment  TRACK: 2A    PATIENT'S NAME: Remy Holloway  MRN:   [id number]  :   2000  ACCT. NUMBER: [id number]  DATE OF SERVICE: 10/15/19  START TIME: 1000  END TIME: 1050    NUMBER OF PARTICIPANTS: 5      Summary of Group / Topics Discussed:  [employer] [employer]:  Exercise: Why exercise: Patients evaluated and discussed their current exercise behaviors/physical activity routine and identified barriers/obstacles to meeting daily physical activity recommendations. Patients were educated on the four types of exercise: endurance, strength, balance, and flexibility. Patients were educated on the benefits of getting daily physical activity, safety tips for beginning an exercise program, and fitness goal setting strategies.     Patient Session Goals / Objectives:  ? Explained the emotional and physical benefits of exercise  ? Identified how exercise and activity affects bodily function  ? Listed ways to incorporate exercise into daily routine    Patient Participation / Response:  Fully participated with the group by sharing personal reflections / insights and openly received / provided feedback with other participants.    Demonstrated understanding of topics discussed through group discussion and participation    Treatment Plan:  Patient has a current master individualized treatment plan.  See Epic treatment plan for more information.

## 2019-10-15 NOTE — PROGRESS NOTES
Past Medical History:   Diagnosis Date     Amblyopia, unspecified      Esotropia, unspecified      Lack of normal physiological development, unspecified     Normal chromosomes, nl MRI, neg fragile X     Redundant prepuce and phimosis     Penile coronal adhesions-repaired     Umbilical hernia without mention of obstruction or gangrene     repaired     Unspecified otitis media     Recurrent       Current Outpatient Medications:      acetylcysteine (N-ACETYL CYSTEINE) 600 MG CAPS capsule, Take one po bid, Disp: 60 capsule, Rfl: 3     clonazePAM (KLONOPIN) 1 MG tablet, 0.5 mg 2 times daily as needed Take 0.5 -1 tablet in the morning, and may repeat one additional dose q day prn, Disp: 90 tablet, Rfl: 1     guanFACINE HCl (INTUNIV) 4 MG TB24, Take one daily for ADHD, Disp: 30 tablet, Rfl: 0     hydrOXYzine (ATARAX) 25 MG tablet, Take one po q hs prn insomnia, Disp: 30 tablet, Rfl: 3     lithium (ESKALITH) 450 MG CR tablet, Take a single 450mg tablet po q hs along with a single 300mg tablet at hs, Disp: 90 tablet, Rfl: 3     lithium (ESKALITH/LITHOBID) 300 MG CR tablet, 750 mg 2 times daily Take 2 po q am and one po q hs along with 450mg dose, Disp: 90 tablet, Rfl: 1     loratadine (CLARITIN) 10 MG capsule, Take 10 mg by mouth daily, Disp: 30 capsule, Rfl: 3     lurasidone (LATUDA) 80 MG TABS tablet, Take 160 mg by mouth, Disp: , Rfl:      melatonin 3 MG tablet, Take one po q hs, Disp: 30 tablet, Rfl: 0     metFORMIN (GLUCOPHAGE) 1000 MG tablet, Take 1 tablet (1,000 mg) by mouth 2 times daily (with meals), Disp: , Rfl:      polyethylene glycol (MIRALAX/GLYCOLAX) powder, Take 17 g (1 capful) by mouth daily PRN constipation, Disp: 1 Bottle, Rfl:      QUEtiapine (SEROQUEL) 400 MG tablet, Take 1 tablet (400 mg) by mouth At Bedtime Take 2 (200mg) po q hs, Disp: 30 tablet, Rfl: 3     QUEtiapine (SEROQUEL) 50 MG tablet, 50 mg as needed Take 1-2 po TID prn, Disp: 120 tablet, Rfl: 1     sodium chloride (OCEAN) 0.65 % nasal spray,  Spray 2 sprays into both nostrils daily as needed for congestion (Patient not taking: Reported on 2/20/2019), Disp: 30 mL, Rfl: 0  Psychiatry staffing: case discussed  Diagnosis:  ASD, ADHD, learning disorder, Bipolar, anxiety, gender dysphoria.  Working in therapy.

## 2019-10-15 NOTE — ADDENDUM NOTE
Encounter addended by: Lopez Gray, OTR/L on: 10/15/2019 1:33 PM   Actions taken: Clinical Note Signed

## 2019-10-17 ENCOUNTER — HOSPITAL ENCOUNTER (OUTPATIENT)
Dept: BEHAVIORAL HEALTH | Facility: CLINIC | Age: 19
End: 2019-10-17
Attending: PSYCHIATRY & NEUROLOGY
Payer: COMMERCIAL

## 2019-10-17 PROCEDURE — G0177 OPPS/PHP; TRAIN & EDUC SERV: HCPCS

## 2019-10-17 PROCEDURE — 90853 GROUP PSYCHOTHERAPY: CPT | Performed by: PSYCHOLOGIST

## 2019-10-17 NOTE — ADDENDUM NOTE
Encounter addended by: Bryan Gerber, OT on: 10/17/2019 2:34 PM   Actions taken: Flowsheet accepted, Clinical Note Signed, Charge Capture section accepted

## 2019-10-17 NOTE — PROGRESS NOTES
Adult Mental Health Day Treatment  TRACK: 2A    PATIENT'S NAME: Remy Holloway  MRN:   [id number]  :   2000  ACCT. NUMBER: [id number]  DATE OF SERVICE: 10/17/19  START TIME: 1100  END TIME: 1150    NUMBER OF PARTICIPANTS: 6      Summary of Group / Topics Discussed:  Cognitive Functioning:Problem Solving Styles: Patients were taught and provided with an opportunity to gain awareness of how their mental health symptoms impact their current cognitive functioning as well as how this impacts their performance and participation in meaningful roles, relationships, and routines.  Patients were taught skills and strategies on how to monitor and improve cognitive performance through remediation or compensatory strategies.  Patients were given opportunities to practice taught skills and techniques in session and how to apply to everyday life.        Patient Session Goals / Objectives:    Identified how their mental health symptoms impact their functioning, focusing on specific cognitive challenges     Improved awareness of specific remediation and/or compensatory strategies to improve  executive functioning skills and how this relates to mental health recovery        Established a plan for practice of these skills in their own environments    Practiced and reflected on how to generalize taught skills to their everyday life    Patient Participation / Response:  Moderately participated, sharing some personal reflections / insights and adequately adequately received / provided feedback with other participants.    Patient presentation: Calm,alert with poor focus and concentration. Thought process clear,goal directed and reality based, Verbalized understanding of content and Patient would benefit from additional opportunities to practice the content to be able to generalize it to their everyday life with increased intentionality, consistency, and efficacy in support of their psychiatric recovery    Treatment Plan:  Patient  has a current master individualized treatment plan.  See Epic treatment plan for more information.

## 2019-10-17 NOTE — PROGRESS NOTES
Adult Mental Health Day Treatment  TRACK: 2A    PATIENT'S NAME: Remy Holloway  MRN:   [id number]  :   2000  ACCT. NUMBER: [id number]  DATE OF SERVICE: 10/17/19  START TIME: 10:00  END TIME: 10:50    NUMBER OF PARTICIPANTS: 5      Summary of Group / Topics Discussed:  Sensory Approaches in Mental Health: Focused Activity: Patients were provided with verbal and experiential education to identify physical and sensorimotor based activities that can be utilized for stress management, self care, health maintenance, and self regulation.  Patients increased knowledge and awareness of activities that support good self care, build resiliency, and prevent relapse through healthy engagement in mindful focused activities for effective coping with illness and reduction of maladaptive coping skills.     Patient Session Goals / Objectives:    Identified specific physical and sensorimotor based activities for stress management, self care, health maintenance, and self regulation      Improved awareness of activities that are meaningful focused activities that support good self care, build resiliency, and prevent relapse and how this applies to current daily life    Established a plan for practice of these skills in their own environments    Practiced and reflected on how to generalize taught skills to their everyday life    Patient Participation / Response:  Moderately participated, sharing some personal reflections / insights and adequately adequately received / provided feedback with other participants.    Patient presentation: intermittently involved, appeared distracted, talking to themself at times. low energy and shares only when asked., Verbalized understanding of content and Patient would benefit from additional opportunities to practice the content to be able to generalize it to their everyday life with increased intentionality, consistency, and efficacy in support of their psychiatric recovery    Seema worked  towards ITP goal: learn, practice, generalize 2 sensory modulation or sensorimotor mindfulness based self regulation skills for improved ability to stay in the present moment and distress tolerance.    Treatment Plan:  Patient has a current master individualized treatment plan.  See Epic treatment plan for more information.

## 2019-10-17 NOTE — PROGRESS NOTES
"Adult Mental Health Day Treatment  TRACK: 2A      PATIENT'S NAME: Remy Holloway  \"Seema\"  MRN:   [id number]  :   2000  ACCT. NUMBER: [id number]  DATE OF SERVICE: 10/17/19  START TIME: 9:00 - 9:50 am      Data:     Client Initial Individualized Goals for Treatment: \"coping skills for anger.\"       Transgender: SHE or they/them : preferred pronouns  [facility]     Psychiatrist Dr Reina Rose MD, [facility].   -Nicolette Cook @ [employer]   Therapist: Jaylin Samson In [place]  PCP:  Dr Lana Faustin @ [facility]     Psychiatric Diagnosis:    Autism Spectrum Disorder 299.00(F84.0)  Associated with another neurodevelopmental, mental or behavioral disorder, Attention-Deficit/Hyperactivity Disorder  314.01 (F90.9) Unspecified Attention -Deficit / Hyperactivity Disorder and Specific Learning Disorder 315.2 (F81.81) With impairment in written expression grammar and punctuation accuracy  296.43 Bipolar I Disorder Current or Most Recent Episode Manic, Severe   300.00 (F41.9) Unspecified Anxiety Disorder  302.85 (F64.1) Gender dysphoria in Adolescents and Adults  With a disorder of sex development     Session content: At the start of this group, patients were invited to check in by identifying themselves, describing their current emotional status, and identifying issues to address in this group.   Area(s) of treatment focus addressed in this session included Symptom Management, Personal Safety and Community Resources/Discharge Planning.      \"Seema\" reported being safe today,  and has no intent to harm self or others. Seema talked about \"angry thoughts\" related to a friend and a previous disagreement. Seema talked to the group about how this friend was working 2 jobs and taking college classes and didn't realize how busy this person was. Seema talked about this with others and realized that the process of change is difficult for many people and felt better about that, since mom is in [place] for a [place] days and dad is at home, but dad wasn't feeling well.  Seema reported that they will participate in a race on Saturday and dad is supposed to participate, but may not, due to not feeling well.     Seema reported an interest in helping [employer] fundraising and will do a Polar Plunge, and has been actively fundraising for this future event.        Therapeutic Interventions/Treatment Strategies:  Psychotherapist offered support, feedback and validation and reinforced use of skills. Treatment modalities used include Motivational Interviewing and Cognitive Behavioral Therapy. Interventions include Cognitive Restructuring:  Explored impact of ineffective thoughts / distortions on mood and activity, Assisted patient in formulating new neutral/positive alternatives to challenge less helpful / ineffective thoughts, Facilitated recognition of the connection between negative thoughts and negative core beliefs and Assisted patient in identifying new neutral/positive core beliefs, Coping Skills: Facilitated discussion on learning and applying radical acceptance skill, Discussed use of self-soothe skills to decrease distress in the body, Assisted patient in identifying 1-2 healthy distraction skills to reduce overall distress and Discussed how the use of intentional \"in the moment\" actions can help reduce distress, Relapse Prevention: Assisted patient in identifying personal vulnerabilities, thoughts, emotions, and situations that may lead to relapse , Coached on skills to manage factors that contribute to relapse, Facilitated understanding of effective coping skills in response to triggers for substance use and Assisted patient in identifying the challenges and barriers to participation and attendance to support groups/community resources, Mindfulness: Facilitated discussion of when/how to use mindfulness skills to benefit general health, mental health symptoms, and stressors and Symptoms Management: " Promoted understanding of their diagnoses and how it impacts their functioning.     Assessment:     Patient response:   Patient responded to session by accepting feedback, giving feedback, listening, focusing on goals, accepting support and verbalizing understanding     Possible barriers to participation / learning include: severity of symptoms     Health Issues:                 None reported                  Substance Use Review:              Substance Use: No active concerns identified.     Mental Status/Behavioral Observations  Appearance:                                    Appropriate   Eye Contact:                                    Good   Psychomotor Behavior:            Normal   Attitude:                                    Cooperative   Orientation:                                    All  Speech   Rate / Production:            Normal   Volume:                        Normal   Mood:                                                Normal  Affect:                                                Appropriate   Thought Content:                        Rumination  Thought Form:                        Coherent  Logical     Insight:                                                Good      Plan:     Safety Plan: Recommended that patient call 911 or go to the local ED should there be a change in any of these risk factors.    Patient consented to co-developed safety plan.  Safety and risk management plan was completed.  Patient agreed to use safety plan should any safety concerns arise.  A copy was given to the patient.     Barriers to treatment: None identified    Patient Contracts (see media tab):  None    Substance Use: not applicable, sober     Continue or Discharge: Patient will continue in Day Treatment (DT)  as planned. Patient is likely to benefit from learning and using skills as they work toward the goals identified in their treatment plan.     Yoana Alfredo., D,  L.P.     .           October 17, 2019

## 2019-10-17 NOTE — ADDENDUM NOTE
Encounter addended by: Lopez Gray, OTR/L on: 10/17/2019 1:30 PM   Actions taken: Flowsheet accepted, Clinical Note Signed, Charge Capture section accepted

## 2019-10-21 ENCOUNTER — HOSPITAL ENCOUNTER (OUTPATIENT)
Dept: BEHAVIORAL HEALTH | Facility: CLINIC | Age: 19
End: 2019-10-21
Attending: PSYCHIATRY & NEUROLOGY
Payer: COMMERCIAL

## 2019-10-21 PROCEDURE — 90853 GROUP PSYCHOTHERAPY: CPT | Performed by: PSYCHOLOGIST

## 2019-10-21 PROCEDURE — G0177 OPPS/PHP; TRAIN & EDUC SERV: HCPCS

## 2019-10-21 NOTE — PROGRESS NOTES
Adult Mental Health Day Treatment  TRACK: 2A    PATIENT'S NAME: Remy Holloway  MRN:   [id number]  :   2000  ACCT. NUMBER: [id number]  DATE OF SERVICE: 10/21/19  START TIME: 10:00 - 10:50 am      NUMBER OF PARTICIPANTS: 4      Summary of Group / Topics Discussed:  Cognitive Restructuring: Core Beliefs: Patients received an overview of what a core belief is, and how they develop. Patients then began to identify their negative core beliefs. Patients worked to modify core beliefs with the goal of improved self-image and functioning.     Patient Session Goals / Objectives:    Familiarize self with the concept of core beliefs and how they develop.      Explore personal core beliefs (positive and negative)    Develop / advance recognition of the connection between negative thoughts and negative core beliefs.    Formulate new neutral/positive core beliefs    Patient Participation / Response:  Fully participated with the group by sharing personal reflections / insights and openly received / provided feedback with other participants.    Demonstrated understanding of topics discussed through group discussion and participation and Expressed understanding of the relationship between behaviors, thoughts, and feelings    Treatment Plan:  Patient has an initial individualized treatment plan that was created as part of their diagnostic assessment / admission process.  A master individualized treatment plan is in the process of being developed with the patient and multi-disciplinary care team.

## 2019-10-21 NOTE — PROGRESS NOTES
"    Adult Mental Health Day Treatment  TRACK: 2A        PATIENT'S NAME:    Remy Holloway  \"Seema\"  MRN:                           [id number]  :                           2000  ACCT. NUMBER:       [id number]  DATE OF SERVICE:  10/21/19  START TIME: 9:00 - 9:50 am        Data:     Client Initial Individualized Goals for Treatment: \"coping skills for anger.\"       Transgender: SHE or they/them : preferred pronouns  [facility]     Psychiatrist Dr Reina Rose MD, [facility].   -Nicolette Cook @ [employer]   Therapist: Jaylin Samson In [place]  PCP:  Dr Lana Faustin @ [facility]     Psychiatric Diagnosis:    Autism Spectrum Disorder 299.00(F84.0)  Associated with another neurodevelopmental, mental or behavioral disorder, Attention-Deficit/Hyperactivity Disorder  314.01 (F90.9) Unspecified Attention -Deficit / Hyperactivity Disorder and Specific Learning Disorder 315.2 (F81.81) With impairment in written expression grammar and punctuation accuracy  296.43 Bipolar I Disorder Current or Most Recent Episode Manic, Severe   300.00 (F41.9) Unspecified Anxiety Disorder  302.85 (F64.1) Gender dysphoria in Adolescents and Adults  With a disorder of sex development     Session content: At the start of this group, patients were invited to check in by identifying themselves, describing their current emotional status, and identifying issues to address in this group.   Area(s) of treatment focus addressed in this session included Symptom Management, Personal Safety and Community Resources/Discharge Planning.      \"Seema\" reported being safe today,  and has no intent to harm self or others. Seema talked about \"angry thoughts\" related to a friend and a previous disagreement. .  Seema reported that they participated in a race on Saturday and dad went, and after the race they couldn't find each other, so Seema asked a  to help locate his dad.  This was beneficial, and a panic attack was avoided, " "once dad was located.  Seema talked with the group about panic attacks.         \Seema reported an interest in helping [employer] fundraising and will do a Polar Plunge, and with the [employer] young adult group, and with running and races and has been actively fundraising for this future event.        Therapeutic Interventions/Treatment Strategies:  Psychotherapist offered support, feedback and validation and reinforced use of skills. Treatment modalities used include Motivational Interviewing and Cognitive Behavioral Therapy. Interventions include Cognitive Restructuring:  Explored impact of ineffective thoughts / distortions on mood and activity, Assisted patient in formulating new neutral/positive alternatives to challenge less helpful / ineffective thoughts, Facilitated recognition of the connection between negative thoughts and negative core beliefs and Assisted patient in identifying new neutral/positive core beliefs, Coping Skills: Facilitated discussion on learning and applying radical acceptance skill, Discussed use of self-soothe skills to decrease distress in the body, Assisted patient in identifying 1-2 healthy distraction skills to reduce overall distress and Discussed how the use of intentional \"in the moment\" actions can help reduce distress, Relapse Prevention: Assisted patient in identifying personal vulnerabilities, thoughts, emotions, and situations that may lead to relapse , Coached on skills to manage factors that contribute to relapse, Facilitated understanding of effective coping skills in response to triggers for substance use and Assisted patient in identifying the challenges and barriers to participation and attendance to support groups/community resources, Mindfulness: Facilitated discussion of when/how to use mindfulness skills to benefit general health, mental health symptoms, and stressors and Symptoms Management: Promoted understanding of their diagnoses and how it impacts " their functioning.     Assessment:     Patient response:   Patient responded to session by accepting feedback, giving feedback, listening, focusing on goals, accepting support and verbalizing understanding     Possible barriers to participation / learning include: severity of symptoms     Health Issues:                 None reported     Substance Use Review:              Substance Use: No active concerns identified.     Mental Status/Behavioral Observations  Appearance:                                    Appropriate   Eye Contact:                                    Good   Psychomotor Behavior:            Normal   Attitude:                                    Cooperative   Orientation:                                    All  Speech   Rate / Production:            Normal   Volume:                        Normal   Mood:                                                Normal  Affect:                                                Appropriate   Thought Content:                        Rumination  Thought Form:                        Coherent  Logical     Insight:                                                Good      Plan:     Safety Plan: Recommended that patient call 911 or go to the local ED should there be a change in any of these risk factors.    Patient consented to co-developed safety plan.  Safety and risk management plan was completed.  Patient agreed to use safety plan should any safety concerns arise.  A copy was given to the patient.     Barriers to treatment: None identified    Patient Contracts (see media tab):  None    Substance Use: not applicable, sober     Continue or Discharge: Patient will continue in Day Treatment (DT)  as planned. Patient is likely to benefit from learning and using skills as they work toward the goals identified in their treatment plan.     Yoana Alfredo., D,  L.P.

## 2019-10-21 NOTE — PROGRESS NOTES
Adult Mental Health Day Treatment  TRACK: 2A    PATIENT'S NAME: Remy Holloway  MRN:   [id number]  :   2000  ACCT. NUMBER: [id number]  DATE OF SERVICE: 10/21/19  START TIME: 11am  END TIME: 11:50pm    NUMBER OF PARTICIPANTS: 4      Summary of Group / Topics Discussed:  Health Maintenance: Wellness Check-in: Patients met with group facilitator to individually review a holistic wellness check-in to assess patient medication adherence/concerns, appointments, physical and mental health, exercise, nutrition, sleep, socialization, substance use, and need for service/resource referrals.       Patient Session Goals / Objectives:    Discussed various aspects of health management and self-care related to physical and mental health    Demonstrated increased self-awareness of current wellness needs    Developed health literacy skills in navigating the healthcare system and self-advocacy/communicating needs with health care team    Patient Participation / Response:  Fully participated with the group by sharing personal reflections / insights and openly received / provided feedback with other participants.    Demonstrated understanding of topics discussed through group discussion and participation    Treatment Plan:  Patient has a current master individualized treatment plan.  See Epic treatment plan for more information.

## 2019-10-22 ENCOUNTER — HOSPITAL ENCOUNTER (OUTPATIENT)
Dept: BEHAVIORAL HEALTH | Facility: CLINIC | Age: 19
End: 2019-10-22
Attending: PSYCHIATRY & NEUROLOGY
Payer: COMMERCIAL

## 2019-10-22 PROCEDURE — 90853 GROUP PSYCHOTHERAPY: CPT | Performed by: PSYCHOLOGIST

## 2019-10-22 PROCEDURE — G0177 OPPS/PHP; TRAIN & EDUC SERV: HCPCS

## 2019-10-22 NOTE — PROGRESS NOTES
Adult Mental Health Day Treatment  TRACK: 2A    PATIENT'S NAME: Remy Holloway  MRN:   [id number]  :   2000  ACCT. NUMBER: [id number]  DATE OF SERVICE: 10/22/19  START TIME: 1000  END TIME: 1050    NUMBER OF PARTICIPANTS: 5      Summary of Group / Topics Discussed:  Mental Health Maintenance: Social/Coping Bingo: Patients were educated on the importance of balance in meeting our wellness needs. Topic of social/coping was reviewed and patients participated in playing a verbal response style coping/social BINGO game. In this game, patients were challenged to utilize their understanding of themselves and their coping strategies to respond to the questions on their BINGO cards.    Patient Session Goals / Objectives:  ? Identified the importance of balance in wellness  ? Explained the important aspects of socialization/effective coping strategies  ? Listed ways of improving weak areas in social/coping skills    Patient Participation / Response:  Fully participated with the group by sharing personal reflections / insights and openly received / provided feedback with other participants.    Demonstrated understanding of topics discussed through group discussion and participation    Treatment Plan:  Patient has a current master individualized treatment plan.  See Epic treatment plan for more information.

## 2019-10-22 NOTE — PROGRESS NOTES
"Adult Mental Health Day Treatment  TRACK: 2A    PATIENT'S NAME: Remy Holloway  MRN:   [id number]  :   2000  ACCT. NUMBER: [id number]  DATE OF SERVICE: 10/22/19  START TIME: 9:00 - 9:50 am        Data:     Client Initial Individualized Goals for Treatment: \"coping skills for anger.\"       Transgender: SHE or they/them : preferred pronouns  [facility] [place]     Psychiatrist Dr Reina Rose MD, [facility].   -Nicolette Cook @ [employer]   Therapist: Jaylin Samson In [place]  PCP:  Dr Lana Faustin @ [facility]     Psychiatric Diagnosis:    Autism Spectrum Disorder 299.00(F84.0)  Associated with another neurodevelopmental, mental or behavioral disorder, Attention-Deficit/Hyperactivity Disorder  314.01 (F90.9) Unspecified Attention -Deficit / Hyperactivity Disorder and Specific Learning Disorder 315.2 (F81.81) With impairment in written expression grammar and punctuation accuracy  296.43 Bipolar I Disorder Current or Most Recent Episode Manic, Severe   300.00 (F41.9) Unspecified Anxiety Disorder  302.85 (F64.1) Gender dysphoria in Adolescents and Adults  With a disorder of sex development     Session content: At the start of this group, patients were invited to check in by identifying themselves, describing their current emotional status, and identifying issues to address in this group.   Area(s) of treatment focus addressed in this session included Symptom Management, Personal Safety and Community Resources/Discharge Planning.      \"Seema\" reported being safe today,  and has no intent to harm self or others. Seema talked about \"angry thoughts\" related to a friend and a previous disagreement with a PCA years ago, but said that they didn't have contact with that person anymore, so there was no issue of harming that person. Seema reported no psychosis symptoms, and that mood was \"ok to good.\"   Seema reported  Decrease in appetite, but was hungry in the middle of the night, last night. " "    Seema reported going to a restaurant with an [employer] worker and getting food, and going to [employer]         Seema reported an interest in helping [employer] fundraising and will do a Polar Plunge, and with the [employer] young adult group, and with running and races and has been actively fundraising for this future event.        Therapeutic Interventions/Treatment Strategies:  Psychotherapist offered support, feedback and validation and reinforced use of skills. Treatment modalities used include Motivational Interviewing and Cognitive Behavioral Therapy. Interventions include Cognitive Restructuring:  Explored impact of ineffective thoughts / distortions on mood and activity, Assisted patient in formulating new neutral/positive alternatives to challenge less helpful / ineffective thoughts, Facilitated recognition of the connection between negative thoughts and negative core beliefs and Assisted patient in identifying new neutral/positive core beliefs, Coping Skills: Facilitated discussion on learning and applying radical acceptance skill, Discussed use of self-soothe skills to decrease distress in the body, Assisted patient in identifying 1-2 healthy distraction skills to reduce overall distress and Discussed how the use of intentional \"in the moment\" actions can help reduce distress, Relapse Prevention: Assisted patient in identifying personal vulnerabilities, thoughts, emotions, and situations that may lead to relapse , Coached on skills to manage factors that contribute to relapse, Facilitated understanding of effective coping skills in response to triggers for substance use and Assisted patient in identifying the challenges and barriers to participation and attendance to support groups/community resources, Mindfulness: Facilitated discussion of when/how to use mindfulness skills to benefit general health, mental health symptoms, and stressors and Symptoms Management: Promoted " understanding of their diagnoses and how it impacts their functioning.     Assessment:     Patient response:   Patient responded to session by accepting feedback, giving feedback, listening, focusing on goals, accepting support and verbalizing understanding     Possible barriers to participation / learning include: severity of symptoms     Health Issues:                 None reported     Substance Use Review:              Substance Use: No active concerns identified.     Mental Status/Behavioral Observations  Appearance:                                    Appropriate   Eye Contact:                                    Good   Psychomotor Behavior:            Normal   Attitude:                                    Cooperative   Orientation:                                    All  Speech   Rate / Production:            Normal   Volume:                        Normal   Mood:                                                Normal  Affect:                                                Appropriate   Thought Content:                        Rumination  Thought Form:                        Coherent  Logical     Insight:                                                Good      Plan:     Safety Plan: Recommended that patient call 911 or go to the local ED should there be a change in any of these risk factors.    Patient consented to co-developed safety plan.  Safety and risk management plan was completed.  Patient agreed to use safety plan should any safety concerns arise.  A copy was given to the patient.     Barriers to treatment: None identified    Patient Contracts (see media tab):  None    Substance Use: not applicable, sober     Continue or Discharge: Patient will continue in Day Treatment (DT)  as planned. Patient is likely to benefit from learning and using skills as they work toward the goals identified in their treatment plan.     Yoana Alfredo., D,  L.P.

## 2019-10-22 NOTE — PROGRESS NOTES
Adult Mental Health Day Treatment  TRACK: 2A    PATIENT'S NAME: Remy Holloway  MRN:   [id number]  :   2000  ACCT. NUMBER: [id number]  DATE OF SERVICE: 10/22/19  START TIME: 0900  END TIME: 0950    NUMBER OF PARTICIPANTS: 5      Summary of Group / Topics Discussed:  Communication and Social Skills Development: Communication Styles: Communication Skills Scale:Patients completed a self assessment of personal communication skills by identifying both strengths and opportunities for growth in areas of messages, emotions, assertiveness and listening skills.  Patients gained awareness of effective communication skills to improve overall communication and connection with other people.      Patient Session Goals / Objectives:    Identified strengths and opportunities for growth in communication skills and how these  impact their ability to communicate clearly with other people       Improved awareness of important aspects of communication skills and how this relates to mental health recovery        Established a plan for practice of these skills in their own environments    Practiced and reflected on how to generalize taught skills to their everyday life    Patient Participation / Response:  Minimally participated, only when prompted / asked.    Patient presentation: Calm with very poor focus and concentration.Very slow processing speen with possible intellectual deficits, Verbalized understanding of content and Patient would benefit from additional opportunities to practice the content to be able to generalize it to their everyday life with increased intentionality, consistency, and efficacy in support of their psychiatric recovery    Treatment Plan:  Patient has a current master individualized treatment plan.  See Epic treatment plan for more information.

## 2019-10-24 ENCOUNTER — HOSPITAL ENCOUNTER (OUTPATIENT)
Dept: BEHAVIORAL HEALTH | Facility: CLINIC | Age: 19
End: 2019-10-24
Attending: PSYCHIATRY & NEUROLOGY
Payer: COMMERCIAL

## 2019-10-24 PROCEDURE — G0177 OPPS/PHP; TRAIN & EDUC SERV: HCPCS

## 2019-10-24 PROCEDURE — 90853 GROUP PSYCHOTHERAPY: CPT

## 2019-10-24 ASSESSMENT — ANXIETY QUESTIONNAIRES
5. BEING SO RESTLESS THAT IT IS HARD TO SIT STILL: MORE THAN HALF THE DAYS
1. FEELING NERVOUS, ANXIOUS, OR ON EDGE: MORE THAN HALF THE DAYS
GAD7 TOTAL SCORE: 13
IF YOU CHECKED OFF ANY PROBLEMS ON THIS QUESTIONNAIRE, HOW DIFFICULT HAVE THESE PROBLEMS MADE IT FOR YOU TO DO YOUR WORK, TAKE CARE OF THINGS AT HOME, OR GET ALONG WITH OTHER PEOPLE: SOMEWHAT DIFFICULT
2. NOT BEING ABLE TO STOP OR CONTROL WORRYING: MORE THAN HALF THE DAYS
6. BECOMING EASILY ANNOYED OR IRRITABLE: MORE THAN HALF THE DAYS
7. FEELING AFRAID AS IF SOMETHING AWFUL MIGHT HAPPEN: MORE THAN HALF THE DAYS
3. WORRYING TOO MUCH ABOUT DIFFERENT THINGS: MORE THAN HALF THE DAYS

## 2019-10-24 ASSESSMENT — PATIENT HEALTH QUESTIONNAIRE - PHQ9: 5. POOR APPETITE OR OVEREATING: SEVERAL DAYS

## 2019-10-24 NOTE — ADDENDUM NOTE
Encounter addended by: Bryan Gerber, OT on: 10/24/2019 4:31 PM   Actions taken: Flowsheet accepted, Clinical Note Signed, Charge Capture section accepted

## 2019-10-24 NOTE — PROGRESS NOTES
"Adult Mental Health Day Treatment  TRACK: 2A    PATIENT'S NAME: Remy Holloway  MRN:   [id number]  :   2000  ACCT. NUMBER: [id number]  DATE OF SERVICE: 10/24/19   START TIME: 9:00 - 9:50 am        Data:     Client Initial Individualized Goals for Treatment: \"coping skills for anger.\"       Transgender: SHE or they/them : preferred pronouns  [facility]     Psychiatrist Dr Reina Rose MD, [facility].   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   Area(s) of treatment focus addressed in this session included Symptom Management, Personal Safety and Community Resources/Discharge Planning.      \"Seema\" reported being safe today,  and has no intent to harm self or others. Seema talked about \"angry thoughts\" related to a friend and a previous disagreement with a PCA years ago, but said that they didn't have contact with that person anymore, so there was no issue of harming that person. Seema reported no psychosis symptoms, and that mood was \"ok to good.\"                  Seema reported getting a new [employer] worker and trying to arrange a regular schedule to " "see Andre, the new [facility] worker.        Seema reported an interest in helping [employer] fundraising and will do a Polar Plunge on 2/22/20. , and with the [email or web address] young adult group, and with running and races and has been actively fundraising for this future event.        Therapeutic Interventions/Treatment Strategies:  Psychotherapist offered support, feedback and validation and reinforced use of skills. Treatment modalities used include Motivational Interviewing and Cognitive Behavioral Therapy. Interventions include Cognitive Restructuring:  Explored impact of ineffective thoughts / distortions on mood and activity, Assisted patient in formulating new neutral/positive alternatives to challenge less helpful / ineffective thoughts, Facilitated recognition of the connection between negative thoughts and negative core beliefs and Assisted patient in identifying new neutral/positive core beliefs, Coping Skills: Facilitated discussion on learning and applying radical acceptance skill, Discussed use of self-soothe skills to decrease distress in the body, Assisted patient in identifying 1-2 healthy distraction skills to reduce overall distress and Discussed how the use of intentional \"in the moment\" actions can help reduce distress, Relapse Prevention: Assisted patient in identifying personal vulnerabilities, thoughts, emotions, and situations that may lead to relapse , Coached on skills to manage factors that contribute to relapse, Facilitated understanding of effective coping skills in response to triggers for substance use and Assisted patient in identifying the challenges and barriers to participation and attendance to support groups/community resources, Mindfulness: Facilitated discussion of when/how to use mindfulness skills to benefit general health, mental health symptoms, and stressors and Symptoms Management: Promoted understanding of their diagnoses and how it impacts their " functioning.     Assessment:     Patient response:   Patient responded to session by accepting feedback, giving feedback, listening, focusing on goals, accepting support and verbalizing understanding     Possible barriers to participation / learning include: severity of symptoms     Health Issues:                 None reported     Substance Use Review:              Substance Use: No active concerns identified.      Mental Status/Behavioral Observations  Appearance:                                    Appropriate   Eye Contact:                                    Good   Psychomotor Behavior:            Normal   Attitude:                                    Cooperative   Orientation:                                    All  Speech   Rate / Production:            Normal   Volume:                        Normal   Mood:                                                Normal  Affect:                                                Appropriate   Thought Content:   Clear, Psychosis denies any symptoms of psychosis and Safety Safety concerns have stayed the same  Thought Form:  Coherent  Goal Directed  Logical     Insight:    Good     Plan:     Safety Plan: homicidal or suicidal thoughts, but no plan or intent to harm himself or others     Barriers to treatment: None identified    Patient Contracts (see media tab):  None    Substance Use: not applicable     Continue or Discharge: Patient will continue in Day Treatment (DT)  as planned. Patient is likely to benefit from learning and using skills as they work toward the goals identified in their treatment plan.      Yoana Alfredo., D,  L.P.   October 24, 2019

## 2019-10-24 NOTE — GROUP NOTE
Life Skills/OT Group Note    PATIENT'S NAME: Remy Holloway  MRN:   [id number]  :   2000  ACCT. NUMBER: [id number]  DATE OF SERVICE: 10/24/19  START TIME: 11:00 AM  END TIME: 11:50 AM  FACILITATOR: Lopez Gray OTR/L  TOPIC:  Life Skills Group: Lifestyle Balance and Structure  Adult Mental Health Day Treatment  TRACK: 2A    NUMBER OF PARTICIPANTS: 3    Summary of Group / Topics Discussed:  Lifestyle Balance and Strucure: : Importance of Leisure: Patients explored and gained awareness of leisure values, benefits, and interests focusing on specific areas of self-development, self-enjoyment, self-support, and self-expenditure.   Patients identified specific activities and skills to employ for effective use of leisure for mental health management and quality of life.      Patient Session Goals / Objectives:    Increased awareness of the importance of engagement in leisure activities to support lifestyle balance and perceived quality of life    Identified specific leisure activities and skills to support mental health management      Facilitated exploration of meaningful leisure interests and values    Practiced and reflected on how to generalize taught skills to their everyday life      Patient Participation / Response:  Moderately participated, sharing some personal reflections / insights and adequately adequately received / provided feedback with other participants.    Patient presentation: Calm,alert with fair focus/concentration. Mood thought process stable, Verbalized understanding of content and Patient would benefit from additional opportunities to practice the content to be able to generalize it to their everyday life with increased intentionality, consistency, and efficacy in support of their psychiatric recovery    Treatment Plan:  Patient has a current master individualized treatment plan.  See Epic treatment plan for more information.    NAVEEN Mcdermott/QUAN

## 2019-10-24 NOTE — PROGRESS NOTES
Adult Mental Health Day Treatment  TRACK: 2A    PATIENT'S NAME: Remy Holloway  MRN:   [id number]  :   2000  ACCT. NUMBER: [id number]  DATE OF SERVICE: 10/24/19  START TIME: 10:00  END TIME: 10:50    NUMBER OF PARTICIPANTS: 3    Summary of Group / Topics Discussed:  Sensory Approaches in Mental Health: Mindfulness Focused Activity:  Patients were provided with an experiential opportunity to practice a sensorimotor enhanced mindful therapeutic engagement as a way of managing stress. Patients worked to increase their knowledge and awareness of the benefits of mindfulness as a stress management tool and were introduced to how specific characteristics of the experiential practice helps them experience, focus, calmness and connection.      Patient Session Goals / Objectives:    Identified how using mindful focused activities can be used for stress management, self care, health maintenance, and self regulation.      Improved awareness of different types of activities that promote relaxation and stress management as a part of good self care, build resiliency, and prevent relapse and how this applies to current daily life    Established a plan for practice of these skills in their own environments    Practiced and reflected on how to generalize taught skills to their everyday life.    Patient Participation / Response:  Minimally participated, only when prompted / asked.    Patient presentation: Distracted, intermittently would get up and leave room, but always came back and re-engaged. , Verbalized understanding of content and Patient would benefit from additional opportunities to practice the content to be able to generalize it to their everyday life with increased intentionality, consistency, and efficacy in support of their psychiatric recovery    Treatment Plan:  Patient has a current master individualized treatment plan.  See Epic treatment plan for more information.

## 2019-10-25 ASSESSMENT — PATIENT HEALTH QUESTIONNAIRE - PHQ9: SUM OF ALL RESPONSES TO PHQ QUESTIONS 1-9: 16

## 2019-10-25 NOTE — ADDENDUM NOTE
Encounter addended by: Cherelle Blanton on: 10/25/2019 9:01 AM   Actions taken: Visit Navigator Flowsheet section accepted

## 2019-10-26 ASSESSMENT — ANXIETY QUESTIONNAIRES: GAD7 TOTAL SCORE: 13

## 2019-10-28 ENCOUNTER — HOSPITAL ENCOUNTER (OUTPATIENT)
Dept: BEHAVIORAL HEALTH | Facility: CLINIC | Age: 19
End: 2019-10-28
Attending: PSYCHIATRY & NEUROLOGY
Payer: COMMERCIAL

## 2019-10-28 PROCEDURE — 90853 GROUP PSYCHOTHERAPY: CPT | Performed by: PSYCHOLOGIST

## 2019-10-28 PROCEDURE — G0177 OPPS/PHP; TRAIN & EDUC SERV: HCPCS

## 2019-10-28 NOTE — PROGRESS NOTES
"Adult Mental Health Day Treatment  TRACK: 2A    PATIENT'S NAME: Remy Holloway  MRN:   [id number]  :   2000  ACCT. NUMBER: [id number]  DATE OF SERVICE: 10/28/19  START TIME: 9:00 - 9:50 am      NUMBER OF PARTICIPANTS: 3  Data:     Client Initial Individualized Goals for Treatment: \"coping skills for anger.\"       Transgender: SHE or they/them : preferred pronouns  [facility]     Psychiatrist Dr Reina Rose MD, [facility].   -Nicolette Cook @ [employer]   Therapist: Jaylin Samson In [place]  PCP:  Dr Lana Faustin @ [facility]     Psychiatric Diagnosis:    Autism Spectrum Disorder 299.00(F84.0)  Associated with another neurodevelopmental, mental or behavioral disorder, Attention-Deficit/Hyperactivity Disorder  314.01 (F90.9) Unspecified Attention -Deficit / Hyperactivity Disorder and Specific Learning Disorder 315.2 (F81.81) With impairment in written expression grammar and punctuation accuracy  296.43 Bipolar I Disorder Current or Most Recent Episode Manic, Severe   300.00 (F41.9) Unspecified Anxiety Disorder  302.85 (F64.1) Gender dysphoria in Adolescents and Adults  With a disorder of sex development     Session content: At the start of this group, patients were invited to check in by identifying themselves, describing their current emotional status, and identifying issues to address in this group.   Area(s) of treatment focus addressed in this session included Symptom Management, Personal Safety and Community Resources/Discharge Planning.      \"Seema\" reported being safe today,  and has no intent to harm self or others. Seema talked about \"angry thoughts\" related to a friend and a previous disagreement with a PCA years ago, but said that they didn't have contact with that person anymore, and there is no issue of harming that person. Seema reported no psychosis symptoms, and that mood was \"ok to good.\"     Seema reported mood is ok, taking medications as prescribed, appetite is " "ok, and is working on future schedules with their parents.                Seema reported they went running with the running  and did a 10 K race this past weekend, and the course was confusing and got off the course at one time.        Seema reported an interest in playing floor hockey in the winter, and is arranging what to do about running over the Winter season.  Seema talked about the [facility] and resources there.        Therapeutic Interventions/Treatment Strategies:  Psychotherapist offered support, feedback and validation and reinforced use of skills. Treatment modalities used include Motivational Interviewing and Cognitive Behavioral Therapy. Interventions include Cognitive Restructuring:  Explored impact of ineffective thoughts / distortions on mood and activity, Assisted patient in formulating new neutral/positive alternatives to challenge less helpful / ineffective thoughts, Facilitated recognition of the connection between negative thoughts and negative core beliefs and Assisted patient in identifying new neutral/positive core beliefs, Coping Skills: Facilitated discussion on learning and applying radical acceptance skill, Discussed use of self-soothe skills to decrease distress in the body, Assisted patient in identifying 1-2 healthy distraction skills to reduce overall distress and Discussed how the use of intentional \"in the moment\" actions can help reduce distress, Relapse Prevention: Assisted patient in identifying personal vulnerabilities, thoughts, emotions, and situations that may lead to relapse , Coached on skills to manage factors that contribute to relapse, Facilitated understanding of effective coping skills in response to triggers for substance use and Assisted patient in identifying the challenges and barriers to participation and attendance to support groups/community resources, Mindfulness: Facilitated discussion of when/how to use mindfulness skills to benefit " general health, mental health symptoms, and stressors and Symptoms Management: Promoted understanding of their diagnoses and how it impacts their functioning.     Assessment:     Patient response:   Patient responded to session by accepting feedback, giving feedback, listening, focusing on goals, accepting support and verbalizing understanding     Possible barriers to participation / learning include: severity of symptoms     Health Issues:                 None reported     Substance Use Review:              Substance Use: No active concerns identified.        Mental Status/Behavioral Observations  Appearance:                                    Appropriate   Eye Contact:                                    Good   Psychomotor Behavior:            Normal   Attitude:                                    Cooperative   Orientation:                                    All  Speech   Rate / Production:            Normal   Volume:                        Normal   Mood:                                                Normal  Affect:                                                Appropriate   Thought Content:                    Clear, Psychosis denies any symptoms of psychosis and Safety Safety concerns have stayed the same  They reported some suicidal thoughts and some homicidal thoughts, but no intent or plan to harm self or others  Thought Form:                        Coherent  Goal Directed  Logical     Insight:                                     Good      Plan:     Safety Plan: homicidal or suicidal thoughts, but no plan or intent to harm himself or others     Barriers to treatment: None identified    Patient Contracts (see media tab):  None    Substance Use: not applicable     Continue or Discharge: Patient will continue in Day Treatment (DT)  as planned. Patient is likely to benefit from learning and using skills as they work toward the goals identified in their treatment plan.        Yoana Alfredo., D,  L.P.

## 2019-10-29 ENCOUNTER — HOSPITAL ENCOUNTER (OUTPATIENT)
Dept: BEHAVIORAL HEALTH | Facility: CLINIC | Age: 19
End: 2019-10-29
Attending: PSYCHIATRY & NEUROLOGY
Payer: COMMERCIAL

## 2019-10-29 PROCEDURE — G0177 OPPS/PHP; TRAIN & EDUC SERV: HCPCS

## 2019-10-29 PROCEDURE — 90853 GROUP PSYCHOTHERAPY: CPT | Performed by: PSYCHOLOGIST

## 2019-10-29 NOTE — GROUP NOTE
Life Skills/OT Group Note    PATIENT'S NAME: Remy Holloway  MRN:   [id number]  :   2000  ACCT. NUMBER: [id number]  DATE OF SERVICE: 10/29/19  START TIME:  9:00 AM  END TIME:  9:50 AM  FACILITATOR: Lopez Gray OTR/L  TOPIC:  Life Skills Group: Lifestyle Balance and Structure  Adult Mental Health Day Treatment  TRACK: 2A    NUMBER OF PARTICIPANTS: 5    Summary of Group / Topics Discussed:  Lifestyle Balance and Strucure:  Occupations: A Balanced Lifestyle: Patients were introduced to the importance of daily occupations in support of mental health management by exploring a balanced lifestyle.  Patients identified how they spend their time and skills to bring this into better balance.  Patients were assisted to establish, restore, and/or modify performance skills and patterns for improved engagement and promotion of positive mental health through meaningful occupations.  Patients gained awareness of the connection between who they are (self identity) with what they do.    Patient Session Goals / Objectives:    Increased awareness of how patient s functioning in identified meaningful occupations are impacted by their mental health status     Developed performance skills and performance patterns to enhance occupational engagement through creating a balanced lifestyle    Explored ways to generalize new awareness and skills to their everyday life        Patient Participation / Response:  Minimally participated, only when prompted / asked.    Patient presentation: Calm,alert with stable mood and thought process.Minimal social interaction with other group members and Patient would benefit from additional opportunities to practice the content to be able to generalize it to their everyday life with increased intentionality, consistency, and efficacy in support of their psychiatric recovery    Treatment Plan:  Patient has a current master individualized treatment plan.  See Epic treatment plan for more  information.    Lopez Gray, OTR/L

## 2019-10-29 NOTE — GROUP NOTE
"Process Group Note    PATIENT'S NAME: Remy Holloway  MRN:   [id number]  :   2000  ACCT. NUMBER: [id number]  DATE OF SERVICE: 10/29/19  START TIME: 11:00 AM  END TIME: 11:50 AM  FACILITATOR: Concepcion Carrillo PsyD  TOPIC:  Process Group    Diagnoses:    Client Initial Individualized Goals for Treatment: \"coping skills for anger.\"       Transgender: SHE or they/them : preferred pronouns  [facility]     Psychiatrist Dr Reina Rose MD, [facility].   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   Area(s) of treatment focus addressed in this session included Symptom Management, Personal Safety and Community Resources/Discharge Planning.      \"Seema\" reported being safe today,  and has no intent to harm self or others. Seema talked about \"angry thoughts\" related to a friend and a previous disagreement with a PCA years ago, but said that they didn't have contact with that person anymore, so there was no issue of harming that person. Seema reported no psychosis symptoms, and that mood was \"ok to good.\"  Seema reported on 3 good things and talked to the group " "about running and planning to be in a 1/2 marathon next June                Seema reported getting a new [employer] worker and is arranging a schedule.        Seema reported an interest in helping [employer] fundraising and will do a Polar Plunge on 2/22/20. , and with the [employer] young adult group, and with running and races and has been actively fundraising for this future event.        Therapeutic Interventions/Treatment Strategies:  Psychotherapist offered support, feedback and validation and reinforced use of skills. Treatment modalities used include Motivational Interviewing and Cognitive Behavioral Therapy. Interventions include Cognitive Restructuring:  Explored impact of ineffective thoughts / distortions on mood and activity, Assisted patient in formulating new neutral/positive alternatives to challenge less helpful / ineffective thoughts, Facilitated recognition of the connection between negative thoughts and negative core beliefs and Assisted patient in identifying new neutral/positive core beliefs, Coping Skills: Facilitated discussion on learning and applying radical acceptance skill, Discussed use of self-soothe skills to decrease distress in the body, Assisted patient in identifying 1-2 healthy distraction skills to reduce overall distress and Discussed how the use of intentional \"in the moment\" actions can help reduce distress, Relapse Prevention: Assisted patient in identifying personal vulnerabilities, thoughts, emotions, and situations that may lead to relapse , Coached on skills to manage factors that contribute to relapse, Facilitated understanding of effective coping skills in response to triggers for substance use and Assisted patient in identifying the challenges and barriers to participation and attendance to support groups/community resources, Mindfulness: Facilitated discussion of when/how to use mindfulness skills to benefit general health, mental health symptoms, and " stressors and Symptoms Management: Promoted understanding of their diagnoses and how it impacts their functioning.     Assessment:     Patient response:   Patient responded to session by accepting feedback, giving feedback, listening, focusing on goals, accepting support and verbalizing understanding     Possible barriers to participation / learning include: severity of symptoms     Health Issues:                 None reported     Substance Use Review:              Substance Use: No active concerns identified.        Mental Status/Behavioral Observations  Appearance:                                    Appropriate   Eye Contact:                                    Good   Psychomotor Behavior:            Normal   Attitude:                                    Cooperative   Orientation:                                    All  Speech   Rate / Production:            Normal   Volume:                        Normal   Mood:                                                Normal  Affect:                                                Appropriate   Thought Content:                    Clear, Psychosis denies any symptoms of psychosis and Safety Safety concerns have stayed the same  Thought Form:                        Coherent  Goal Directed  Logical     Insight:                                     Good      Plan:     Safety Plan: homicidal or suicidal thoughts, but no plan or intent to harm himself or others     Barriers to treatment: None identified    Patient Contracts (see media tab):  None    Substance Use: not applicable     Continue or Discharge: Patient will continue in Day Treatment (DT)  as planned. Patient is likely to benefit from learning and using skills as they work toward the goals identified in their treatment plan.        Yoana Alfredo., D,  L.P.       October 29, 2019

## 2019-10-29 NOTE — GROUP NOTE
RN Group Note    PATIENT'S NAME: Remy Holloway  MRN:   [id number]  :   2000  [id number]  DATE OF SERVICE: 10/29/19  START TIME: 10:00 AM  END TIME: 10:50 AM  FACILITATOR: Dian Robles RN  TOPIC: ANJU RN Group: Brain Health  Adult Mental Health Day Treatment  TRACK: 2A    NUMBER OF PARTICIPANTS: 6    Summary of Group / Topics Discussed:  Brain Health:  Pathophysiology of stress and anxiety: Patients were educated on the difference between stress, chronic stress, and anxiety. The anatomy and pathophysiology of the body/brain were reviewed to illustrate the immediate effects of stress/anxiety in the body and the long term effects and increased risks for chronic disease that come from unmanaged stress/anxiety. Self-coping strategies to manage symptoms of stress were reviewed and pharmacologic, psychotherapeutic, and complementary treatment options were discussed.    Patient Session Goals / Objectives:  ? Described the differences between stress and anxiety and how the body responds to it  ? Listed the long term effects and increased risks for chronic disease that can arise from unmanaged stress/anxiety  ? Identified and described pharmacologic, psychotherapeutic, and complementary treatment options        Patient Participation / Response:  Fully participated with the group by sharing personal reflections / insights and openly received / provided feedback with other participants.    Demonstrated understanding of topics discussed through group discussion and participation    Treatment Plan:  Patient has a current master individualized treatment plan.  See Epic treatment plan for more information.    Dian Robles RN

## 2019-10-31 ENCOUNTER — TELEPHONE (OUTPATIENT)
Dept: BEHAVIORAL HEALTH | Facility: CLINIC | Age: 19
End: 2019-10-31

## 2019-10-31 ENCOUNTER — HOSPITAL ENCOUNTER (OUTPATIENT)
Dept: BEHAVIORAL HEALTH | Facility: CLINIC | Age: 19
End: 2019-10-31
Attending: PSYCHIATRY & NEUROLOGY
Payer: COMMERCIAL

## 2019-10-31 PROCEDURE — G0177 OPPS/PHP; TRAIN & EDUC SERV: HCPCS

## 2019-10-31 PROCEDURE — 90853 GROUP PSYCHOTHERAPY: CPT | Performed by: PSYCHOLOGIST

## 2019-10-31 NOTE — GROUP NOTE
Life Skills/OT Group Note    PATIENT'S NAME: Remy Holloway  MRN:   [id number]  :   2000  ACCT. NUMBER: [id number]  DATE OF SERVICE: 10/31/19  START TIME: 10:00 AM  END TIME: 10:50 AM  FACILITATOR: Bryan eGrber OT  TOPIC:  Life Skills Group: Sensory Approaches in Mental Health  Adult Mental Health Day Treatment  TRACK: 2A    NUMBER OF PARTICIPANTS: 8    Summary of Group / Topics Discussed:  Sensory Approaches in Mental Health:  Self Regulation Through Sensory Modulation:  Patients were provided with education on Autonomic Nervous System activation and taught skills that can be used immediately to help them calm down and regain self control.  Patients were taught how to recognize specific signs and symptoms of their individualized state of arousal and how to make changes when needed.  Patients explored body based skills (bottom up processing skills) and techniques to help manage symptoms and change level of arousal when needed to be in control and comfortable so they are able to function in different environments.       Patient Session Goals / Objectives:    Identified specific and individualized neurosensory skills to help when distressed and for crisis management    Identified signs and symptoms of current level of arousal and ways to make changes in level of arousal when needed    Established a plan for practice of these skills in their own environments        Patient Participation / Response:  Moderately participated, sharing some personal reflections / insights and adequately adequately received / provided feedback with other participants.    Verbalized understanding of content and Patient would benefit from additional opportunities to practice the content to be able to generalize it to their everyday life with increased intentionality, consistency, and efficacy in support of their psychiatric recovery    Treatment Plan:  Patient has a current master individualized treatment plan.  See Epic  treatment plan for more information.    Bryan Gerber, OT

## 2019-10-31 NOTE — GROUP NOTE
Life Skills/OT Group Note    PATIENT'S NAME: Remy Holloway  MRN:   [id number]  :   2000  ACCT. NUMBER: [id number]  DATE OF SERVICE: 10/31/19  START TIME: 11:00 AM  END TIME: 11:50 AM  FACILITATOR: Lopez Gray OTR/L  TOPIC:  Life Skills Group: Lifestyle Balance and Structure  Adult Mental Health Day Treatment  TRACK: 2A    NUMBER OF PARTICIPANTS: 8    Summary of Group / Topics Discussed:  Lifestyle Balance and Strucure:  Time Management: Self Assessment/Strategies for Self Improvement: Patients were introduced to how effective time management is beneficial to self esteem, relationships with others, life balance, and other aspects of daily life.   Patients were taught strategies on how to improve time management skills.    Patient Session Goals / Objectives:    Facilitated the discussion on challenges/barriers and personal situations with time management     Identified specific techniques and strategies to improve time management to support mental health recovery       Identified a plan to implement strategies into daily life to support improved time management         Patient Participation / Response:  Minimally participated, only when prompted / asked.    Patient presentation: Patient presented a calm and alert with stable mood.His focus/concentration remains poor with very slow processing speed when attempting to answer written handouts Minimal socil interaction with other group members although he seems observant of others and Patient would benefit from additional opportunities to practice the content to be able to generalize it to their everyday life with increased intentionality, consistency, and efficacy in support of their psychiatric recovery    Treatment Plan:  Patient has a current master individualized treatment plan.  See Epic treatment plan for more information.    NAVEEN Mcdermott/QUAN

## 2019-10-31 NOTE — GROUP NOTE
"Process Group Note    PATIENT'S NAME: Remy Holloway  MRN:   [id number]  :   2000  ACCT. NUMBER: [id number]  DATE OF SERVICE: 10/31/19  START TIME:  9:00 AM  END TIME:  9:50 AM  FACILITATOR: Concepcion Carrillo PsyD  TOPIC:  Process Group       Client Initial Individualized Goals for Treatment: \"coping skills for anger.\"       Transgender: SHE or they/them : preferred pronouns  [facility]     Psychiatrist Dr Reina Rose MD, [facility].   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  Seema discussed grief and loss issues with the group about how people have moved away and how relationships have changed.  The group validated all feelings and discussed grief and loss.   Data:    Session content: At the start of this group, patients were invited to check in by identifying themselves, describing their current emotional status, and identifying issues to address in this group.   Area(s) of treatment focus addressed in this session included Symptom Management, Personal Safety, Community Resources/Discharge Planning, Abstinence/Relapse Prevention, and Develop / Improve Independent Living " "Skills.    Client reported to be safe today, and reported in to the group.    Therapeutic Interventions/Treatment Strategies:  Psychotherapist offered support, feedback and validation and reinforced use of skills. Treatment modalities used include Cognitive Behavioral Therapy. Interventions include Behavioral Activation: Reinforced benefits/challenges of change process through applying skills to replace unwanted behaviors, Explored how behaviors effect mood and interact with thoughts and feelings, and Encouraged strategies to reduce individual procrastination and increase motivation by increasing goal-directed activities to enhance mood and reduce symptoms., Cognitive Restructuring:  Explored impact of ineffective thoughts / distortions on mood and activity, Assisted patient in formulating new neutral/positive alternatives to challenge less helpful / ineffective thoughts, Facilitated recognition of the connection between negative thoughts and negative core beliefs, and Assisted patient in identifying new neutral/positive core beliefs, Coping Skills: Facilitated discussion on learning and applying radical acceptance skill, Discussed use of self-soothe skills to decrease distress in the body, Assisted patient in identifying 1-2 healthy distraction skills to reduce overall distress, Discussed how the use of intentional \"in the moment\" actions can help reduce distress, Reviewed patients current calming practices and discussed a more formal way of practicing and accessing skills, Promoted understanding of how and when to apply grounding strategies to reduce distress and increase presence in the moment, Discussed meditation skills and addressed ways to implement meditation skills , Assisted patient in understanding the purpose of planning / creating / participating / sharing in positive experiences, Facilitated understanding of  what factors may contribute to symptom relapse and skills plan to manage symptom relapse , and " Addressed barriers to utilizing coping skills when in distress, Relapse Prevention: Facilitated understanding the importance of awareness of factors that contribute to relapse , Assisted patient in identifying personal vulnerabilities, thoughts, emotions, and situations that may lead to relapse , Coached on skills to manage factors that contribute to relapse, Facilitated understanding of effective coping skills in response to triggers for substance use, Assisted patient in identifying the challenges and barriers to participation and attendance to support groups/community resources, and Discussed the use of substances and its impact on their relationships, Mindfulness: Facilitated discussion of when/how to use mindfulness skills to benefit general health, mental health symptoms, and stressors and Encouraged a plan to use mindfulness skills in daily life, Symptoms Management: Promoted understanding of their diagnoses and how it impacts their functioning, Emotions Management:  Reinforced the purpose and biological basis of emotions, Discussed barriers to emotional regulation, Reviewed opposite action skill, and Increased awareness of daily mood patterns/changes, and Relationship Skills: Assisted patients in implementing more effective communication skills in their relationships, Encouraged development and maintenance  of healthy boundaries, Discussed strategies to promote healthier understanding of interpersonal relationships, and Discussed relationships and ways to reduce conflict .  Assessment:     Patient response:   Patient responded to session by accepting feedback, giving feedback, listening, focusing on goals, accepting support and verbalizing understanding     Possible barriers to participation / learning include: severity of symptoms     Health Issues:                 None reported     Substance Use Review:              Substance Use: No active concerns identified.        Mental Status/Behavioral  Observations  Appearance:                                    Appropriate   Eye Contact:                                    Good   Psychomotor Behavior:            Normal   Attitude:                                    Cooperative   Orientation:                                    All  Speech   Rate / Production:            Normal   Volume:                        Normal   Mood:                                                Normal  Affect:                                                Appropriate   Thought Content:                    Clear, Psychosis denies any symptoms of psychosis and Safety Safety concerns have stayed the same  Thought Form:                        Coherent  Goal Directed  Logical     Insight:                                     Good      Plan:     Safety Plan: homicidal or suicidal thoughts, but no plan or intent to harm himself or others     Barriers to treatment: None identified    Patient Contracts (see media tab):  None    Substance Use: not applicable     Continue or Discharge: Patient will continue in Day Treatment (DT)  as planned. Patient is likely to benefit from learning and using skills as they work toward the goals identified in their treatment plan.        Patti Alfredo, D,  L.P.           Patti Alfredo, D,  L.P.    October 31, 2019

## 2019-11-04 ENCOUNTER — HOSPITAL ENCOUNTER (OUTPATIENT)
Dept: BEHAVIORAL HEALTH | Facility: CLINIC | Age: 19
End: 2019-11-04
Attending: PSYCHIATRY & NEUROLOGY
Payer: COMMERCIAL

## 2019-11-04 PROCEDURE — 90853 GROUP PSYCHOTHERAPY: CPT | Performed by: PSYCHOLOGIST

## 2019-11-04 PROCEDURE — G0177 OPPS/PHP; TRAIN & EDUC SERV: HCPCS

## 2019-11-04 PROCEDURE — 90853 GROUP PSYCHOTHERAPY: CPT

## 2019-11-04 NOTE — GROUP NOTE
RN Group Note    PATIENT'S NAME: Remy Holloway  MRN:   [id number]  :   2000  ACCT. NUMBER: [id number]  DATE OF SERVICE: 19  START TIME: 11:00 AM  END TIME: 11:50 AM  FACILITATOR: Alma Ramires  TOPIC:  RN Group: Health Maintenance  Adult Mental Health Day Treatment  TRACK: 2A    NUMBER OF PARTICIPANTS: 7    Summary of Group / Topics Discussed:  Health Maintenance: Goal Setting: Meaningful goals can bring a sense of direction and purpose in life.  They also highlight our most important values. Patients were assisted by instructor to identify short term goals to promote their mental health recovery and improve overall health and wellness. Patients were educated on SMART goal setting framework as a strategy to increase outcomes and promote success.    Patient Session Goals / Objectives:  ? Explained the key concepts of SMART goal setting framework  ? Identified three goals successfully using SMART goal setting framework  ? Reviewed concept of balance in wellness as it pertains to goal setting        Patient Participation / Response:  Fully participated with the group by sharing personal reflections / insights and openly received / provided feedback with other participants.  Patient identified barriers to goal and skills to use.    Demonstrated understanding of topics discussed through group discussion and participation, Identified / Expressed personal readiness to practice skills and Verbalized understanding of health maintenance topic    Treatment Plan:  Patient has a current master individualized treatment plan.  See Epic treatment plan for more information.    Alma Ramires

## 2019-11-04 NOTE — GROUP NOTE
"Process Group Note    PATIENT'S NAME: Remy Holloway  MRN:   [id number]  :   2000  ACCT. NUMBER: [id number]  DATE OF SERVICE: 19  START TIME:  9:00 AM  END TIME:  9:50 AM  FACILITATOR: Concepcion Carrillo PsyD  TOPIC:  Process Group        Adult Mental Health Day Treatment  TRACK: 2A    NUMBER OF PARTICIPANTS: 7           Client Initial Individualized Goals for Treatment: \"coping skills for anger.\"       Transgender: SHE or they/them : preferred pronouns  [facility]     Psychiatrist Dr Reina Rose MD, [facility].   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 A thought journal page was written out on the page and recorded feelings and thoughts about the past situation.  Seema reported being safe, with no intent to harm self or others, and no plan or intent to do anything.  Seema discussed grief and loss issues with the group about how people have moved away and how relationships have changed.     Seema talked about how running has been helpful to relieve anxiety symptoms.      Data:     Session content: At the start of this group, patients were invited to check in by identifying themselves, describing their current emotional status, and identifying " "issues to address in this group.   Area(s) of treatment focus addressed in this session included Symptom Management, Personal Safety, Community Resources/Discharge Planning, Abstinence/Relapse Prevention, and Develop / Improve Independent Living Skills.     Client reported to be safe today, and reported in to the group.     Therapeutic Interventions/Treatment Strategies:  Psychotherapist offered support, feedback and validation and reinforced use of skills. Treatment modalities used include Cognitive Behavioral Therapy. Interventions include Behavioral Activation: Reinforced benefits/challenges of change process through applying skills to replace unwanted behaviors, Explored how behaviors effect mood and interact with thoughts and feelings, and Encouraged strategies to reduce individual procrastination and increase motivation by increasing goal-directed activities to enhance mood and reduce symptoms., Cognitive Restructuring:  Explored impact of ineffective thoughts / distortions on mood and activity, Assisted patient in formulating new neutral/positive alternatives to challenge less helpful / ineffective thoughts, Facilitated recognition of the connection between negative thoughts and negative core beliefs, and Assisted patient in identifying new neutral/positive core beliefs, Coping Skills: Facilitated discussion on learning and applying radical acceptance skill, Discussed use of self-soothe skills to decrease distress in the body, Assisted patient in identifying 1-2 healthy distraction skills to reduce overall distress, Discussed how the use of intentional \"in the moment\" actions can help reduce distress, Reviewed patients current calming practices and discussed a more formal way of practicing and accessing skills, Promoted understanding of how and when to apply grounding strategies to reduce distress and increase presence in the moment, Discussed meditation skills and addressed ways to implement meditation " skills , Assisted patient in understanding the purpose of planning / creating / participating / sharing in positive experiences, Facilitated understanding of  what factors may contribute to symptom relapse and skills plan to manage symptom relapse , and Addressed barriers to utilizing coping skills when in distress, Relapse Prevention: Facilitated understanding the importance of awareness of factors that contribute to relapse , Assisted patient in identifying personal vulnerabilities, thoughts, emotions, and situations that may lead to relapse , Coached on skills to manage factors that contribute to relapse, Facilitated understanding of effective coping skills in response to triggers for substance use, Assisted patient in identifying the challenges and barriers to participation and attendance to support groups/community resources, and Discussed the use of substances and its impact on their relationships, Mindfulness: Facilitated discussion of when/how to use mindfulness skills to benefit general health, mental health symptoms, and stressors and Encouraged a plan to use mindfulness skills in daily life, Symptoms Management: Promoted understanding of their diagnoses and how it impacts their functioning, Emotions Management:  Reinforced the purpose and biological basis of emotions, Discussed barriers to emotional regulation, Reviewed opposite action skill, and Increased awareness of daily mood patterns/changes, and Relationship Skills: Assisted patients in implementing more effective communication skills in their relationships, Encouraged development and maintenance  of healthy boundaries, Discussed strategies to promote healthier understanding of interpersonal relationships, and Discussed relationships and ways to reduce conflict .  Assessment:     Patient response:   Patient responded to session by accepting feedback, giving feedback, listening, focusing on goals, accepting support and verbalizing  understanding     Possible barriers to participation / learning include: severity of symptoms     Health Issues:                 None reported     Substance Use Review:              Substance Use: No active concerns identified.        Mental Status/Behavioral Observations  Appearance:                                    Appropriate   Eye Contact:                                    Good   Psychomotor Behavior:     pacing, getting up to leave group, but comes back  Attitude:                                    Cooperative   Orientation:                                    All  Speech   Rate / Production:            Normal   Volume:                        Normal   Mood:                                    feeling overwhelmed by a past trauma memory today  Affect:                                                Appropriate   Thought Content:                    Clear, Psychosis denies any symptoms of psychosis and Safety Safety concerns have stayed the same  Thought Form:                        Coherent  Goal Directed  Logical     Insight:                                     Good      Plan:     Safety Plan: homicidal or suicidal thoughts, but no plan or intent to harm himself or others     Barriers to treatment: None identified    Patient Contracts (see media tab):  None    Substance Use: not applicable     Continue or Discharge: Patient will continue in Day Treatment (DT)  as planned. Patient is likely to benefit from learning and using skills as they work toward the goals identified in their treatment plan.        Yoana Alfredo., D,  L.P.   November 4, 2019

## 2019-11-04 NOTE — GROUP NOTE
EBP Group Note    PATIENT'S NAME: Remy Holloway  MRN:   [id number]  :   2000  ACCT. NUMBER: [id number]  DATE OF SERVICE: 19  START TIME: 10:00 AM  END TIME: 10:50 AM  FACILITATOR: Concepcion Carrillo PsyD  TOPIC:  EBP Group: Self-Awareness  Adult Mental Health Day Treatment  TRACK: 2A    NUMBER OF PARTICIPANTS: 7    Summary of Group / Topics Discussed:  Self-Awareness: Self-Compassion: Patients received overview of key concepts in developing self-compassion. Patients discussed mindfulness, self-kindness, and finding common humanity. Patients identified their current approach to problems in their lives and learned skills for increasing self-compassion. Patients identified ways they can put self-compassion skills into practice and problem solve barriers to application of skills.     Patient Session Goals / Objectives:    [place] components of self-compassion    Identify ways to practice self-compassion in daily life    Problem solve barriers to self-compassion practice      Patient Participation / Response:  Fully participated with the group by sharing personal reflections / insights and openly received / provided feedback with other participants.    Demonstrated understanding of topics discussed through group discussion and participation    Treatment Plan:  Patient has a current master individualized treatment plan.  See Epic treatment plan for more information.    Concepcion Carrillo PsyD

## 2019-11-05 ENCOUNTER — HOSPITAL ENCOUNTER (OUTPATIENT)
Dept: BEHAVIORAL HEALTH | Facility: CLINIC | Age: 19
End: 2019-11-05
Attending: PSYCHIATRY & NEUROLOGY
Payer: COMMERCIAL

## 2019-11-05 PROCEDURE — 90853 GROUP PSYCHOTHERAPY: CPT | Performed by: PSYCHOLOGIST

## 2019-11-05 PROCEDURE — G0177 OPPS/PHP; TRAIN & EDUC SERV: HCPCS

## 2019-11-05 NOTE — ADDENDUM NOTE
Encounter addended by: Concepcion Carrillo PsyD on: 11/5/2019 4:24 PM   Actions taken: Flowsheet accepted

## 2019-11-05 NOTE — GROUP NOTE
RN Group Note    PATIENT'S NAME: Remy Holloway  MRN:   [id number]  :   2000  ACCT. NUMBER: [id number]  DATE OF SERVICE: 19  START TIME: 10:00 AM  END TIME: 10:50 AM  FACILITATOR: Dian Robles RN  TOPIC:  RN Group: Mental Health Maintenance  Adult Mental Health Day Treatment  TRACK: 2A    NUMBER OF PARTICIPANTS: 8    Summary of Group / Topics Discussed:  Mental Health Maintenance:  Stigma: In this group patients explored stigma surrounding a mental health diagnosis.  The group discussed the way stigma impacts their own life, and discussed strategies to reduce. The relationship between physical and mental health were also explored in the context of healthcare access, treatment, and support.    Patient Session Goals / Objectives:  ? Patients identified the importance of practicing emotional hygiene  ? Patients identified ways to decrease the  impact of stigma in their own life          Patient Participation / Response:  Fully participated with the group by sharing personal reflections / insights and openly received / provided feedback with other participants.    Demonstrated understanding of topics discussed through group discussion and participation    Treatment Plan:  Patient has a current master individualized treatment plan.  See Epic treatment plan for more information.    Dian Robles RN

## 2019-11-05 NOTE — GROUP NOTE
"Process Group Note    PATIENT'S NAME: Remy Holloway  MRN:   [id number]  :   2000  ACCT. NUMBER: [id number]  DATE OF SERVICE: 19  START TIME: 11:00 AM  END TIME: 11:50 AM  FACILITATOR: Concepcion Carrillo PsyD  TOPIC:  Process Group    Adult Mental Health Day Treatment  TRACK: 2A    NUMBER OF PARTICIPANTS: 8        Client Initial Individualized Goals for Treatment: \"coping skills for anger.\"       Transgender: SHE or they/them : preferred pronouns  [facility]     Psychiatrist Dr Reina Rose MD, [facility].   -Nicolette Cook @ [employer]   Therapist: Jaylin Samson In [place]  PCP:  Dr Lana Faustin @ [facility]     Diagnosis:    Autism Spectrum Disorder 299.00(F84.0)  Associated with another neurodevelopmental, mental or behavioral disorder, Attention-Deficit/Hyperactivity Disorder  314.01 (F90.9) Unspecified Attention -Deficit / Hyperactivity Disorder and Specific Learning Disorder 315.2 (F81.81) With impairment in written expression grammar and punctuation accuracy  296.43 Bipolar I Disorder Current or Most Recent Episode Manic, Severe   300.00 (F41.9) Unspecified Anxiety Disorder  302.85 (F64.1) Gender dysphoria in Adolescents and Adults  With a disorder of  sex development     Seema reported that when feeling distressed they can talk with parents about the situation and be able to distance themselves from the emotions and thoughts.  Melody reported problems in the past with hallucinations and felt there was some paranoia, recently with depression and anxiety symptoms.  For a past situation at a job at [employer] a thought journal page was written out on the page and recorded feelings and thoughts about the past situation.  Seema reported being safe, with no intent to harm self or others, and no plan or intent to do anything.  Seema discussed grief and loss issues with the group about how people have moved away and how relationships have changed.                Seema talked about how " "running has been helpful to relieve anxiety symptoms.        Data:     Session content: At the start of this group, patients were invited to check in by identifying themselves, describing their current emotional status, and identifying issues to address in this group.   Area(s) of treatment focus addressed in this session included Symptom Management, Personal Safety, Community Resources/Discharge Planning, Abstinence/Relapse Prevention, and Develop / Improve Independent Living Skills.     Client reported to be safe today, and reported in to the group.     Therapeutic Interventions/Treatment Strategies:  Psychotherapist offered support, feedback and validation and reinforced use of skills. Treatment modalities used include Cognitive Behavioral Therapy. Interventions include Behavioral Activation: Reinforced benefits/challenges of change process through applying skills to replace unwanted behaviors, Explored how behaviors effect mood and interact with thoughts and feelings, and Encouraged strategies to reduce individual procrastination and increase motivation by increasing goal-directed activities to enhance mood and reduce symptoms., Cognitive Restructuring:  Explored impact of ineffective thoughts / distortions on mood and activity, Assisted patient in formulating new neutral/positive alternatives to challenge less helpful / ineffective thoughts, Facilitated recognition of the connection between negative thoughts and negative core beliefs, and Assisted patient in identifying new neutral/positive core beliefs, Coping Skills: Facilitated discussion on learning and applying radical acceptance skill, Discussed use of self-soothe skills to decrease distress in the body, Assisted patient in identifying 1-2 healthy distraction skills to reduce overall distress, Discussed how the use of intentional \"in the moment\" actions can help reduce distress, Reviewed patients current calming practices and discussed a more formal way " of practicing and accessing skills, Promoted understanding of how and when to apply grounding strategies to reduce distress and increase presence in the moment, Discussed meditation skills and addressed ways to implement meditation skills , Assisted patient in understanding the purpose of planning / creating / participating / sharing in positive experiences, Facilitated understanding of  what factors may contribute to symptom relapse and skills plan to manage symptom relapse , and Addressed barriers to utilizing coping skills when in distress, Relapse Prevention: Facilitated understanding the importance of awareness of factors that contribute to relapse , Assisted patient in identifying personal vulnerabilities, thoughts, emotions, and situations that may lead to relapse , Coached on skills to manage factors that contribute to relapse, Facilitated understanding of effective coping skills in response to triggers for substance use, Assisted patient in identifying the challenges and barriers to participation and attendance to support groups/community resources, and Discussed the use of substances and its impact on their relationships, Mindfulness: Facilitated discussion of when/how to use mindfulness skills to benefit general health, mental health symptoms, and stressors and Encouraged a plan to use mindfulness skills in daily life, Symptoms Management: Promoted understanding of their diagnoses and how it impacts their functioning, Emotions Management:  Reinforced the purpose and biological basis of emotions, Discussed barriers to emotional regulation, Reviewed opposite action skill, and Increased awareness of daily mood patterns/changes, and Relationship Skills: Assisted patients in implementing more effective communication skills in their relationships, Encouraged development and maintenance  of healthy boundaries, Discussed strategies to promote healthier understanding of interpersonal relationships, and Discussed  relationships and ways to reduce conflict .  Assessment:     Patient response:   Patient responded to session by accepting feedback, giving feedback, listening, focusing on goals, accepting support and verbalizing understanding     Possible barriers to participation / learning include: severity of symptoms     Health Issues:                 None reported     Substance Use Review:              Substance Use: No active concerns identified.        Mental Status/Behavioral Observations  Appearance:                                    Appropriate   Eye Contact:                                    Good   Psychomotor Behavior:     pacing, getting up to leave group, but comes back  Attitude:                                    Cooperative   Orientation:                                    All  Speech   Rate / Production:            Normal   Volume:                        Normal   Mood:                                    feeling overwhelmed by a past trauma memory today  Affect:                                                Appropriate   Thought Content:                    Clear, Psychosis denies any symptoms of psychosis and Safety Safety concerns have stayed the same  Thought Form:                        Coherent  Goal Directed  Logical     Insight:                                     Good      Plan:     Safety Plan: homicidal or suicidal thoughts, but no plan or intent to harm himself or others     Barriers to treatment: None identified    Patient Contracts (see media tab):  None    Substance Use: not applicable     Continue or Discharge: Patient will continue in Day Treatment (DT)  as planned. Patient is likely to benefit from learning and using skills as they work toward the goals identified in their treatment plan.        Yoana Alfredo., D,  L.P.   November 5, 2019

## 2019-11-05 NOTE — ADDENDUM NOTE
Encounter addended by: Alma Ramires on: 11/5/2019 12:30 PM   Actions taken: Charge Capture section accepted

## 2019-11-05 NOTE — GROUP NOTE
Life Skills/OT Group Note    PATIENT'S NAME: Remy Holloway  MRN:   [id number]  :   2000  ACCT. NUMBER: [id number]  DATE OF SERVICE: 19  START TIME:  9:00 AM  END TIME:  9:50 AM  FACILITATOR: Lopez Gray OTR/L  TOPIC:  Life Skills Group: Lifestyle Balance and Structure  Adult Mental Health Day Treatment  TRACK: 6B    NUMBER OF PARTICIPANTS: 8    Summary of Group / Topics Discussed:  Lifestyle Balance and Strucure:  Routines, Habits, Rituals, and Roles(Money Management): Patients were assisted to identify meaningful roles that they want to promote and the impact their mental health symptoms have on this.  Patients learned, applied, and generalized skills needed to live and participate in meaningful roles as effectively and independently as possible.  Patients developed awareness of strengths and challenges in fulfilling these roles and worked on integrating specific and individualized rituals and habits into their daily life.     Patient Session Goals / Objectives:    Improved awareness and engagement in life s meaningful roles, routines, habits, and rituals    Explored and identified current roles and challenges due to mental health symptoms     Identified ways to establish and integrate daily self care and wellness routines and habits to support mental health recovery    Practiced and reflected on how to generalize taught skills to their everyday life      Patient Participation / Response:  Minimally participated, only when prompted / asked.    Patient presentation: Calm,alert with stable mood and thought process and Patient would benefit from additional opportunities to practice the content to be able to generalize it to their everyday life with increased intentionality, consistency, and efficacy in support of their psychiatric recovery    Treatment Plan:  Patient has a current master individualized treatment plan.  See Epic treatment plan for more information.    Lopez Gray,  OTR/L

## 2019-11-07 ENCOUNTER — HOSPITAL ENCOUNTER (OUTPATIENT)
Dept: BEHAVIORAL HEALTH | Facility: CLINIC | Age: 19
End: 2019-11-07
Attending: PSYCHIATRY & NEUROLOGY
Payer: COMMERCIAL

## 2019-11-07 PROCEDURE — G0177 OPPS/PHP; TRAIN & EDUC SERV: HCPCS

## 2019-11-07 PROCEDURE — 90853 GROUP PSYCHOTHERAPY: CPT | Performed by: PSYCHOLOGIST

## 2019-11-07 NOTE — GROUP NOTE
Life Skills/OT Group Note    PATIENT'S NAME: Remy Holloway  MRN:   [id number]  :   2000  ACCT. NUMBER: [id number]  DATE OF SERVICE: 19  START TIME: 11:00 AM  END TIME: 11:50 AM  FACILITATOR: Lopez Gray OTR/L  TOPIC:  Life Skills Group: Resiliency Development  Adult Mental Health Day Treatment  TRACK: 2A    NUMBER OF PARTICIPANTS: 8    Summary of Group / Topics Discussed:  Resiliency Development:  Coping Skills to Manage Stress: Patients were taught how to identify stressors, signs of stress, coping skills, and prevention strategies for overall stress management.  Patients were given the opportunity to identify both ongoing and acute mental health symptoms and how to effectively manage these symptoms by developing an effective aftercare plan.  Patients increased awareness of community based resources.    Patient Session Goals / Objectives:    Identified how using coping skills can be used for symptom and stress management       Improved awareness of individualed symptoms and stressors and how to effectively cope     Established a relapse prevention plan to practice these skills in their own environments    Practiced and reflected on how to generalize taught skills to their everyday life          Patient Participation / Response:  Minimally participated, only when prompted / asked.    Patient presentation: Calm with stable mood and thought process. Poor focus and concentration and Patient would benefit from additional opportunities to practice the content to be able to generalize it to their everyday life with increased intentionality, consistency, and efficacy in support of their psychiatric recovery    Treatment Plan:  Patient has a current master individualized treatment plan.  See Epic treatment plan for more information.    NAVEEN Mcdermott/QUAN

## 2019-11-07 NOTE — GROUP NOTE
"Process Group Note    PATIENT'S NAME: Remy Holloway  MRN:   [id number]  :   2000  ACCT. NUMBER: [id number]  DATE OF SERVICE: 19  START TIME:  9:00 AM  END TIME:  9:50 AM  FACILITATOR: Concepcino Carrillo PsyD  TOPIC:  Process Group        Adult Mental Health Day Treatment  TRACK: 2A    NUMBER OF PARTICIPANTS: 8        Client Initial Individualized Goals for Treatment: \"coping skills for anger.\"       Transgender: SHE or they/them : preferred pronouns  [facility]     Psychiatrist Dr Reina Rose MD, [facility].   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  Seema discussed grief and loss issues with the group about how people have moved away and how relationships have changed.     Seema reported that when feeling distressed they can talk with parents about the situation and be able to distance themselves from the emotions and thoughts.  Seema reported problems in the past with hallucinations. Seema reported some paranoia,  Some nights with poor sleep, and ongoing depression and anxiety symptoms.  Seema got up to leave the group once.     Seema talked about a problem at a store where the debit card didn't work, so mom had to cover the bill with her " credit card, and a call to another person will be made by Seema to check on the debit card.              Seema talked about how running has been helpful to relieve anxiety symptoms, and talked to the running  this week for some support.        Data:     Session content: At the start of this group, patients were invited to check in by identifying themselves, describing their current emotional status, and identifying issues to address in this group.   Area(s) of treatment focus addressed in this session included Symptom Management, Personal Safety, Community Resources/Discharge Planning, Abstinence/Relapse Prevention, and Develop / Improve Independent Living Skills.     Client reported to be safe today, and reported in to the group.     Therapeutic Interventions/Treatment Strategies:  Psychotherapist offered support, feedback and validation and reinforced use of skills. Treatment modalities used include Cognitive Behavioral Therapy. Interventions include Behavioral Activation: Reinforced benefits/challenges of change process through applying skills to replace unwanted behaviors, Explored how behaviors effect mood and interact with thoughts and feelings, and Encouraged strategies to reduce individual procrastination and increase motivation by increasing goal-directed activities to enhance mood and reduce symptoms., Cognitive Restructuring:  Explored impact of ineffective thoughts / distortions on mood and activity, Assisted patient in formulating new neutral/positive alternatives to challenge less helpful / ineffective thoughts, Facilitated recognition of the connection between negative thoughts and negative core beliefs, and Assisted patient in identifying new neutral/positive core beliefs, Coping Skills: Facilitated discussion on learning and applying radical acceptance skill, Discussed use of self-soothe skills to decrease distress in the body, Assisted patient in identifying 1-2 healthy distraction skills to  "reduce overall distress, Discussed how the use of intentional \"in the moment\" actions can help reduce distress, Reviewed patients current calming practices and discussed a more formal way of practicing and accessing skills, Promoted understanding of how and when to apply grounding strategies to reduce distress and increase presence in the moment, Discussed meditation skills and addressed ways to implement meditation skills , Assisted patient in understanding the purpose of planning / creating / participating / sharing in positive experiences, Facilitated understanding of  what factors may contribute to symptom relapse and skills plan to manage symptom relapse , and Addressed barriers to utilizing coping skills when in distress, Relapse Prevention: Facilitated understanding the importance of awareness of factors that contribute to relapse , Assisted patient in identifying personal vulnerabilities, thoughts, emotions, and situations that may lead to relapse , Coached on skills to manage factors that contribute to relapse, Facilitated understanding of effective coping skills in response to triggers for substance use, Assisted patient in identifying the challenges and barriers to participation and attendance to support groups/community resources, and Discussed the use of substances and its impact on their relationships, Mindfulness: Facilitated discussion of when/how to use mindfulness skills to benefit general health, mental health symptoms, and stressors and Encouraged a plan to use mindfulness skills in daily life, Symptoms Management: Promoted understanding of their diagnoses and how it impacts their functioning, Emotions Management:  Reinforced the purpose and biological basis of emotions, Discussed barriers to emotional regulation, Reviewed opposite action skill, and Increased awareness of daily mood patterns/changes, and Relationship Skills: Assisted patients in implementing more effective communication skills in " their relationships, Encouraged development and maintenance  of healthy boundaries, Discussed strategies to promote healthier understanding of interpersonal relationships, and Discussed relationships and ways to reduce conflict .  Assessment:     Patient response:   Patient responded to session by accepting feedback, giving feedback, listening, focusing on goals, accepting support and verbalizing understanding     Possible barriers to participation / learning include: severity of symptoms     Health Issues:                 None reported     Substance Use Review:              Substance Use: No active concerns identified.        Mental Status/Behavioral Observations  Appearance:                                    Appropriate   Eye Contact:                                    Good   Psychomotor Behavior:     pacing, getting up to leave group, but comes back  Attitude:                                    Cooperative   Orientation:                                    All  Speech   Rate / Production:            Normal   Volume:                        Normal   Mood:                                    feeling overwhelmed by a past trauma memory today  Affect:                                                Appropriate   Thought Content:                    Clear, Psychosis denies any symptoms of psychosis and Safety Safety concerns have stayed the same  Thought Form:                        Coherent  Goal Directed  Logical     Insight:                                     Good      Plan:     Safety Plan: homicidal or suicidal thoughts, but no plan or intent to harm himself or others     Barriers to treatment: None identified    Patient Contracts (see media tab):  None    Substance Use: not applicable     Continue or Discharge: Patient will continue in Day Treatment (DT)  as planned. Patient is likely to benefit from learning and using skills as they work toward the goals identified in their treatment plan.        Concepcion Hinson  Patti Carrillo, APOLINAR,  L.P.  November 7, 2019

## 2019-11-07 NOTE — GROUP NOTE
Life Skills/OT Group Note    PATIENT'S NAME: Remy Holloway  MRN:   [id number]  :   2000  ACCT. NUMBER: [id number]  DATE OF SERVICE: 19  START TIME: 10:00 AM  END TIME: 10:50 AM  FACILITATOR: Bryan Gerber OT  TOPIC:  Life Skills Group: Sensory Approaches in Mental Health  Adult Mental Health Day Treatment  TRACK: 2A    NUMBER OF PARTICIPANTS: 8    Summary of Group / Topics Discussed:  Sensory Approaches in Mental Health:  Sensory Enhanced Mindfulness focused on proprioception. Patients were taught and provided with an opportunity to explore and practice how using sensory enhanced mindfulness practices can help them stay grounded in the present moment as a way to manage mental health symptoms and stressors.         Patient Session Goals / Objectives:    Identified how using sensory enhanced mindfulness practices can be used for grounding, stress management, and self regulation      Improved awareness of proprioceptive sensory enhanced mindfulness activities that assist with healthy coping of stress and symptoms      Established a plan for practice of these skills in their own environments    Practiced and reflected on how to generalize taught skills to their everyday life        Patient Participation / Response:  Fully participated with the group by sharing personal reflections / insights and openly received / provided feedback with other participants.    Verbalized understanding of content and Patient would benefit from additional opportunities to practice the content to be able to generalize it to their everyday life with increased intentionality, consistency, and efficacy in support of their psychiatric recovery    Treatment Plan:  Patient has a current master individualized treatment plan.  See Epic treatment plan for more information.    Bryan Gerber OT

## 2019-11-11 ENCOUNTER — HOSPITAL ENCOUNTER (OUTPATIENT)
Dept: BEHAVIORAL HEALTH | Facility: CLINIC | Age: 19
End: 2019-11-11
Attending: PSYCHIATRY & NEUROLOGY
Payer: COMMERCIAL

## 2019-11-11 PROCEDURE — 90853 GROUP PSYCHOTHERAPY: CPT | Performed by: PSYCHOLOGIST

## 2019-11-11 NOTE — GROUP NOTE
"Process Group Note    PATIENT'S NAME: Remy Holloway  MRN:   [id number]  :   2000  ACCT. NUMBER: [id number]  DATE OF SERVICE: 19  START TIME:  9:00 AM  END TIME:  9:50 AM  FACILITATOR: Concepcion Carrillo PsyD  TOPIC:  Process Group        Adult Mental Health Day Treatment  TRACK: 2A    NUMBER OF PARTICIPANTS: 5      Client Initial Individualized Goals for Treatment: \"coping skills for anger.\"       Transgender: SHE or they/them : preferred pronouns  [facility]     Psychiatrist Dr Reina Rose MD, [facility].   -Nicolette Cook @ [employer]   Therapist: Jaylin Samson In [place]  PCP:  Dr Lana Faustin @ [facility]     Diagnosis:    Autism Spectrum Disorder 299.00(F84.0)  Associated with another neurodevelopmental, mental or behavioral disorder, Attention-Deficit/Hyperactivity Disorder  314.01 (F90.9) Unspecified Attention -Deficit / Hyperactivity Disorder and Specific Learning Disorder 315.2 (F81.81) With impairment in written expression grammar and punctuation accuracy  296.43 Bipolar I Disorder Current or Most Recent Episode Manic, Severe   300.00 (F41.9) Unspecified Anxiety Disorder  302.85 (F64.1) Gender dysphoria in Adolescents and Adults  With a disorder of   sex development      Seema reported that they were talking as a family and making arrangements for holiday travel to [place], and will be going there to 19 through around  and will let staff know what days they will travel.      Seema reported problems in the past with hallucinations and felt there was some paranoia, recently with depression and anxiety symptoms.  For a past situation at a job at [employer], a thought journal page was written out on the page and recorded feelings and thoughts about the past situation.  Seema reported being safe, with no intent to harm self or others, and no plan or intent to do anything.  Seema discussed grief and loss issues with the group about how people have moved away and how " relationships have changed.     Seema reported feeling nauseated and left after the 2nd hour.               Seema talked about how running has been helpful to relieve anxiety symptoms.        Data:     Session content: At the start of this group, patients were invited to check in by identifying themselves, describing their current emotional status, and identifying issues to address in this group.   Area(s) of treatment focus addressed in this session included Symptom Management, Personal Safety, Community Resources/Discharge Planning, Abstinence/Relapse Prevention, and Develop / Improve Independent Living Skills.     Client reported to be safe today, and reported in to the group.     Therapeutic Interventions/Treatment Strategies:  Psychotherapist offered support, feedback and validation and reinforced use of skills. Treatment modalities used include Cognitive Behavioral Therapy. Interventions include Behavioral Activation: Reinforced benefits/challenges of change process through applying skills to replace unwanted behaviors, Explored how behaviors effect mood and interact with thoughts and feelings, and Encouraged strategies to reduce individual procrastination and increase motivation by increasing goal-directed activities to enhance mood and reduce symptoms., Cognitive Restructuring:  Explored impact of ineffective thoughts / distortions on mood and activity, Assisted patient in formulating new neutral/positive alternatives to challenge less helpful / ineffective thoughts, Facilitated recognition of the connection between negative thoughts and negative core beliefs, and Assisted patient in identifying new neutral/positive core beliefs, Coping Skills: Facilitated discussion on learning and applying radical acceptance skill, Discussed use of self-soothe skills to decrease distress in the body, Assisted patient in identifying 1-2 healthy distraction skills to reduce overall distress, Discussed how the use of  "intentional \"in the moment\" actions can help reduce distress, Reviewed patients current calming practices and discussed a more formal way of practicing and accessing skills, Promoted understanding of how and when to apply grounding strategies to reduce distress and increase presence in the moment, Discussed meditation skills and addressed ways to implement meditation skills , Assisted patient in understanding the purpose of planning / creating / participating / sharing in positive experiences, Facilitated understanding of  what factors may contribute to symptom relapse and skills plan to manage symptom relapse , and Addressed barriers to utilizing coping skills when in distress, Relapse Prevention: Facilitated understanding the importance of awareness of factors that contribute to relapse , Assisted patient in identifying personal vulnerabilities, thoughts, emotions, and situations that may lead to relapse , Coached on skills to manage factors that contribute to relapse, Facilitated understanding of effective coping skills in response to triggers for substance use, Assisted patient in identifying the challenges and barriers to participation and attendance to support groups/community resources, and Discussed the use of substances and its impact on their relationships, Mindfulness: Facilitated discussion of when/how to use mindfulness skills to benefit general health, mental health symptoms, and stressors and Encouraged a plan to use mindfulness skills in daily life, Symptoms Management: Promoted understanding of their diagnoses and how it impacts their functioning, Emotions Management:  Reinforced the purpose and biological basis of emotions, Discussed barriers to emotional regulation, Reviewed opposite action skill, and Increased awareness of daily mood patterns/changes, and Relationship Skills: Assisted patients in implementing more effective communication skills in their relationships, Encouraged development and " maintenance  of healthy boundaries, Discussed strategies to promote healthier understanding of interpersonal relationships, and Discussed relationships and ways to reduce conflict .  Assessment:     Patient response:   Patient responded to session by accepting feedback, giving feedback, listening, focusing on goals, accepting support and verbalizing understanding     Possible barriers to participation / learning include: severity of symptoms     Health Issues:                 None reported     Substance Use Review:              Substance Use: No active concerns identified.        Mental Status/Behavioral Observations  Appearance:                                    Appropriate   Eye Contact:                                    Good   Psychomotor Behavior:     pacing, getting up to leave group, but comes back  Attitude:                                    Cooperative   Orientation:                                    All  Speech   Rate / Production:            Normal   Volume:                        Normal   Mood:                                    feeling overwhelmed by a past trauma memory today  Affect:                                                Appropriate   Thought Content:                    Clear, Psychosis denies any symptoms of psychosis and Safety Safety concerns have stayed the same  Thought Form:                        Coherent  Goal Directed  Logical     Insight:                                     Good      Plan:     Safety Plan: homicidal or suicidal thoughts, but no plan or intent to harm himself or others     Barriers to treatment: None identified    Patient Contracts (see media tab):  None    Substance Use: not applicable     Continue or Discharge: Patient will continue in Day Treatment (DT)  as planned. Patient is likely to benefit from learning and using skills as they work toward the goals identified in their treatment plan.        Yoana Alfredo., D,  L.P.   November 11, 2019

## 2019-11-11 NOTE — GROUP NOTE
EBP Group Note    PATIENT'S NAME: Remy Holloway  MRN:   [id number]  :   2000  ACCT. NUMBER: [id number]  DATE OF SERVICE: 19  START TIME: 10:00 AM  END TIME: 10:50 AM  FACILITATOR: Concepcion Carrillo PsyD  TOPIC:  EBP Group: Cognitive Restructuring  Adult Mental Health Day Treatment  TRACK: 2A    NUMBER OF PARTICIPANTS: 5    Summary of Group / Topics Discussed:  Cognitive Restructuring: Distortions: Patients received an overview of how to identify common cognitive distortions. Patients will explore alternatives to cognitive distortions and practice challenging their negative thought patterns. The goal is to help patients target modify ineffective thought patterns.     Patient Session Goals / Objectives:    Familiarized self with ineffective / unhealthy thoughts and how they develop.      Explored impact of ineffective thoughts / distortions on mood and activity    Formulated new neutral/positive alternatives to challenge less helpful / ineffective thoughts.    Practiced and plan to apply in daily life               Patient Participation / Response:  Fully participated with the group by sharing personal reflections / insights and openly received / provided feedback with other participants.    Demonstrated understanding of topics discussed through group discussion and participation and Expressed understanding of the relationship between behaviors, thoughts, and feelings    Treatment Plan:  Patient has a current master individualized treatment plan.  See Epic treatment plan for more information.    Concepcion Carrillo PsyD

## 2019-11-12 ENCOUNTER — HOSPITAL ENCOUNTER (OUTPATIENT)
Dept: BEHAVIORAL HEALTH | Facility: CLINIC | Age: 19
End: 2019-11-12
Attending: PSYCHIATRY & NEUROLOGY
Payer: COMMERCIAL

## 2019-11-12 PROCEDURE — 90853 GROUP PSYCHOTHERAPY: CPT | Performed by: PSYCHOLOGIST

## 2019-11-12 PROCEDURE — G0177 OPPS/PHP; TRAIN & EDUC SERV: HCPCS

## 2019-11-12 ASSESSMENT — ANXIETY QUESTIONNAIRES
1. FEELING NERVOUS, ANXIOUS, OR ON EDGE: MORE THAN HALF THE DAYS
6. BECOMING EASILY ANNOYED OR IRRITABLE: SEVERAL DAYS
2. NOT BEING ABLE TO STOP OR CONTROL WORRYING: MORE THAN HALF THE DAYS
7. FEELING AFRAID AS IF SOMETHING AWFUL MIGHT HAPPEN: SEVERAL DAYS
5. BEING SO RESTLESS THAT IT IS HARD TO SIT STILL: MORE THAN HALF THE DAYS
GAD7 TOTAL SCORE: 12
IF YOU CHECKED OFF ANY PROBLEMS ON THIS QUESTIONNAIRE, HOW DIFFICULT HAVE THESE PROBLEMS MADE IT FOR YOU TO DO YOUR WORK, TAKE CARE OF THINGS AT HOME, OR GET ALONG WITH OTHER PEOPLE: SOMEWHAT DIFFICULT
3. WORRYING TOO MUCH ABOUT DIFFERENT THINGS: MORE THAN HALF THE DAYS

## 2019-11-12 ASSESSMENT — PATIENT HEALTH QUESTIONNAIRE - PHQ9: 5. POOR APPETITE OR OVEREATING: MORE THAN HALF THE DAYS

## 2019-11-12 NOTE — GROUP NOTE
RN Group Note    PATIENT'S NAME: Remy Holloway  MRN:   [id number]  :   2000  ACCT. NUMBER: [id number]  DATE OF SERVICE: 19  START TIME: 10:00 AM  END TIME: 10:50 AM  FACILITATOR: Dian Robles RN  TOPIC:  RN Group: Mind/Body Practice & Complementary  Adult Mental Health Day Treatment  TRACK: 2A    NUMBER OF PARTICIPANTS: 6    Summary of Group / Topics Discussed:  Mind/Body Practice & Complementary Therapies:  Progressive Muscle Relaxation: In addition to affecting our mood and behavior, psychological stress can cause a myriad of physical symptoms in our body. Patients were educated on these effects and guided to increased self-awareness of how stress affects their body. The purpose, benefits, history, and techniques of progressive muscle relaxation were discussed. In an instructor guided experiential, patients were guided to practice PMR to help reduce physical symptoms of psychological stress and achieve a more balanced feeling of well-being.    Patient Session Goals / Objectives:  ? Identified physiological symptoms of stress on the body  ? Listed & Explained the purpose and benefits to practicing PMR   ? Practiced progressive muscle relaxation experiential        Patient Participation / Response:  Fully participated with the group by sharing personal reflections / insights and openly received / provided feedback with other participants.    Demonstrated understanding of topics discussed through group discussion and participation    Treatment Plan:  Patient has a current master individualized treatment plan.  See Epic treatment plan for more information.    Dian Robles RN

## 2019-11-12 NOTE — GROUP NOTE
Life Skills/OT Group Note    PATIENT'S NAME: Remy Holloway  MRN:   [id number]  :   2000  ACCT. NUMBER: [id number]  DATE OF SERVICE: 19  START TIME:  9:00 AM  END TIME:  9:50 AM  FACILITATOR: Lopez Gray OTR/L  TOPIC:  Life Skills Group: Communication and Social Skills Development  Adult Mental Health Day Treatment  TRACK: 2A    NUMBER OF PARTICIPANTS: 6      Summary of Group / Topics Discussed:  Communication and Social Skills Development: Listening Skills: {Patients were taught and gained awareness into personal barriers to effective listening and how this can negatively impact relationships.  Patients were taught skills and practiced how to improve communication with others by becoming an active listener.  Patients identified both personal strengths and opportunities for growth in this area to improve overall communication and connection with other people as a way to build meaningful relationships to combat mental health and substance use/abuse symptoms.      Patient Session Goals / Objectives:    Identified importance of active listening skills in effective communication and how this impacts their ability to communicate clearly with other people       Improved awareness of important aspects of listening skills and how this relates to mental health recovery        Established a plan for practice of these skills in their own environments    Practiced and reflected on how to generalize taught skills to their everyday life        Patient Participation / Response:  Minimally participated, only when prompted / asked.    Patient presentation: Calm mood with stable thought process. Poor focus and attention and Patient would benefit from additional opportunities to practice the content to be able to generalize it to their everyday life with increased intentionality, consistency, and efficacy in support of their psychiatric recovery    Treatment Plan:  Patient has a current master individualized  treatment plan.  See Epic treatment plan for more information.    Lopez Gray, OTR/L

## 2019-11-12 NOTE — GROUP NOTE
"Process Group Note    PATIENT'S NAME: Remy Holloway  MRN:   [id number]  :   2000  ACCT. NUMBER: [id number]  DATE OF SERVICE: 19  START TIME: 11:00 AM  END TIME: 11:50 AM  FACILITATOR: Concepcion Carrillo PsyD  TOPIC:  Process Group      Adult Mental Health Day Treatment  TRACK: 2A    NUMBER OF PARTICIPANTS: 7          Data:  Client Initial Individualized Goals for Treatment: \"coping skills for anger.\"       Transgender: SHE or they/them : preferred pronouns  [facility]     Psychiatrist Dr Reina Rose MD, [facility].   -Nicolette Cook @ [employer]   Therapist: Jaylin Samson In [place]  PCP:  Dr Lana Faustin @ [facility]     Diagnosis:    Autism Spectrum Disorder 299.00(F84.0)  Associated with another neurodevelopmental, mental or behavioral disorder, Attention-Deficit/Hyperactivity Disorder  314.01 (F90.9) Unspecified Attention -Deficit / Hyperactivity Disorder and Specific Learning Disorder 315.2 (F81.81) With impairment in written expression grammar and punctuation accuracy  296.43 Bipolar I Disorder Current or Most Recent Episode Manic, Severe   300.00 (F41.9) Unspecified Anxiety Disorder  302.85 (F64.1) Gender dysphoria in Adolescents and Adults  With a disorder of   sex development                  Seema reported being safe, with no intent to harm self or others, and no plan or intent to do anything.  Seema discussed grief and loss issues with the group about how people have moved away and how relationships have changed.  The group validated feelings.   Seema reported that they were talking as a family and making arrangements for holiday travel to [place], and including the brother who lives on the [facility], as well, will be going there sometime from 19 through around  and will let staff know what days they will travel.                 Seema reported problems in the past with hallucinations and felt there was some paranoia, recently with depression and anxiety " symptoms, but noticed that symptoms have decreased.                Seema reported feeling physically better today and stayed all 3 hours. Seema stated that they would like to increase involvement in hobbies and in supportive activities.                Seema talked about how running has been helpful to relieve anxiety symptoms.        Data:     Session content: At the start of this group, patients were invited to check in by identifying themselves, describing their current emotional status, and identifying issues to address in this group.   Area(s) of treatment focus addressed in this session included Symptom Management, Personal Safety, Community Resources/Discharge Planning, Abstinence/Relapse Prevention, and Develop / Improve Independent Living Skills.     Client reported to be safe today, and reported in to the group.     Therapeutic Interventions/Treatment Strategies:  Psychotherapist offered support, feedback and validation and reinforced use of skills. Treatment modalities used include Cognitive Behavioral Therapy. Interventions include Behavioral Activation: Reinforced benefits/challenges of change process through applying skills to replace unwanted behaviors, Explored how behaviors effect mood and interact with thoughts and feelings, and Encouraged strategies to reduce individual procrastination and increase motivation by increasing goal-directed activities to enhance mood and reduce symptoms., Cognitive Restructuring:  Explored impact of ineffective thoughts / distortions on mood and activity, Assisted patient in formulating new neutral/positive alternatives to challenge less helpful / ineffective thoughts, Facilitated recognition of the connection between negative thoughts and negative core beliefs, and Assisted patient in identifying new neutral/positive core beliefs, Coping Skills: Facilitated discussion on learning and applying radical acceptance skill, Discussed use of self-soothe skills to decrease  "distress in the body, Assisted patient in identifying 1-2 healthy distraction skills to reduce overall distress, Discussed how the use of intentional \"in the moment\" actions can help reduce distress, Reviewed patients current calming practices and discussed a more formal way of practicing and accessing skills, Promoted understanding of how and when to apply grounding strategies to reduce distress and increase presence in the moment, Discussed meditation skills and addressed ways to implement meditation skills , Assisted patient in understanding the purpose of planning / creating / participating / sharing in positive experiences, Facilitated understanding of  what factors may contribute to symptom relapse and skills plan to manage symptom relapse , and Addressed barriers to utilizing coping skills when in distress, Relapse Prevention: Facilitated understanding the importance of awareness of factors that contribute to relapse , Assisted patient in identifying personal vulnerabilities, thoughts, emotions, and situations that may lead to relapse , Coached on skills to manage factors that contribute to relapse, Facilitated understanding of effective coping skills in response to triggers for substance use, Assisted patient in identifying the challenges and barriers to participation and attendance to support groups/community resources, and Discussed the use of substances and its impact on their relationships, Mindfulness: Facilitated discussion of when/how to use mindfulness skills to benefit general health, mental health symptoms, and stressors and Encouraged a plan to use mindfulness skills in daily life, Symptoms Management: Promoted understanding of their diagnoses and how it impacts their functioning, Emotions Management:  Reinforced the purpose and biological basis of emotions, Discussed barriers to emotional regulation, Reviewed opposite action skill, and Increased awareness of daily mood patterns/changes, and " Relationship Skills: Assisted patients in implementing more effective communication skills in their relationships, Encouraged development and maintenance  of healthy boundaries, Discussed strategies to promote healthier understanding of interpersonal relationships, and Discussed relationships and ways to reduce conflict .  Assessment:     Patient response:   Patient responded to session by accepting feedback, giving feedback, listening, focusing on goals, accepting support and verbalizing understanding     Possible barriers to participation / learning include: severity of symptoms     Health Issues:                 None reported     Substance Use Review:              Substance Use: No active concerns identified.        Mental Status/Behavioral Observations  Appearance:                                    Appropriate   Eye Contact:                                    Good   Psychomotor Behavior:     pacing, getting up to leave group, but comes back  Attitude:                Cooperative   Orientation:             All  Speech Rate / Production:            Normal   Volume:                        Normal   Mood:      feeling overwhelmed by a past trauma memory today  Affect:           Appropriate   Thought Content:                    Clear, Psychosis denies any symptoms of psychosis and Safety Safety concerns have stayed the same  Thought Form:                        Coherent  Goal Directed  Logical     Insight:                                     Good      Plan:     Safety Plan: homicidal or suicidal thoughts, but no plan or intent to harm himself or others     Barriers to treatment: None identified    Patient Contracts (see media tab):  None    Substance Use: not applicable     Continue or Discharge: Patient will continue in Day Treatment (DT)  as planned. Patient is likely to benefit from learning and using skills as they work toward the goals identified in their treatment plan.        Patti Alfredo, D,   RAJESH  November 12, 2019

## 2019-11-14 ENCOUNTER — HOSPITAL ENCOUNTER (OUTPATIENT)
Dept: BEHAVIORAL HEALTH | Facility: CLINIC | Age: 19
End: 2019-11-14
Attending: PSYCHIATRY & NEUROLOGY
Payer: COMMERCIAL

## 2019-11-14 PROCEDURE — G0177 OPPS/PHP; TRAIN & EDUC SERV: HCPCS

## 2019-11-14 PROCEDURE — 90853 GROUP PSYCHOTHERAPY: CPT | Performed by: PSYCHOLOGIST

## 2019-11-14 NOTE — GROUP NOTE
Life Skills/OT Group Note    PATIENT'S NAME: Remy Holloway  MRN:   [id number]  :   2000  ACCT. NUMBER: [id number]  DATE OF SERVICE: 19  START TIME: 11:00 AM  END TIME: 11:50 AM  FACILITATOR: Lopez Gray OTR/L  TOPIC:  Life Skills Group: Resiliency Development  Adult Mental Health Day Treatment  TRACK: 2A    NUMBER OF PARTICIPANTS: 5    Summary of Group / Topics Discussed:  Resiliency Development:  Self Esteem and Self Compassion: A Letter to Myself: Patients were given time for reflection and built self awareness around components of self compassion and how it relates to self esteem and overall functioning.  Patients were also given the opportunity to improve self efficacy, self sufficiency, quality of life and a sense of mastery and competency by identifying what is good and to instill hope. Patients were taught about the importance of self kindness and ways to challenge negative thinking.      Patient Session Goals / Objectives:    Identified personal strengths and qualities about themselves as a way to provide hope and build self-compassion as a way to effectively manage both mental health and substance abuse/abuse symptoms     Improved awareness of positive qualities and how these contribute to the process of recovery        Established a plan for practice of these skills in their own environments    Practiced and reflected on how to generalize taught skills to their everyday life        Patient Participation / Response:  Moderately participated, sharing some personal reflections / insights and adequately adequately received / provided feedback with other participants.    Patient presentation: Calm with stable mood and thought process. He reported some self-confidence with his social connections with a few friends he runs with weekly. Therapist also gave patient some Autism resources at [facility] and Patient would benefit from additional opportunities to practice the  content to be able to generalize it to their everyday life with increased intentionality, consistency, and efficacy in support of their psychiatric recovery    Treatment Plan:  Patient has a current master individualized treatment plan.  See Epic treatment plan for more information.    Lopez Gray, OTR/L

## 2019-11-14 NOTE — GROUP NOTE
"Process Group Note    PATIENT'S NAME: Remy Holloway  MRN:   [id number]  :   2000  ACCT. NUMBER: [id number]  DATE OF SERVICE: 19  START TIME:  9:00 AM  END TIME:  9:50 AM  FACILITATOR: Concepcion Carrillo PsyD  TOPIC:  Process Group    Diagnoses:  Client Initial Individualized Goals for Treatment: \"coping skills for anger.\"       Transgender: SHE or they/them : preferred pronouns  [facility]     Psychiatrist Dr Reina Rose MD, [facility].   -Nicolette Cook @ [employer]   Therapist: Jaylin Samson In [place]  PCP:  Dr Lana Faustin @ [facility]     Diagnosis:    Autism Spectrum Disorder 299.00(F84.0)  Associated with another neurodevelopmental, mental or behavioral disorder, Attention-Deficit/Hyperactivity Disorder  314.01 (F90.9) Unspecified Attention -Deficit / Hyperactivity Disorder and Specific Learning Disorder 315.2 (F81.81) With impairment in written expression grammar and punctuation accuracy  296.43 Bipolar I Disorder Current or Most Recent Episode Manic, Severe   300.00 (F41.9) Unspecified Anxiety Disorder  302.85 (F64.1) Gender dysphoria in Adolescents and Adults  With a disorder of   sex development    Adult Mental Health Day Treatment  TRACK: 2A    NUMBER OF PARTICIPANTS: 6          Data:    Session content: At the start of this group, patients were invited to check in by identifying themselves, describing their current emotional status, and identifying issues to address in this group.   Area(s) of treatment focus addressed in this session included Symptom Management, Personal Safety and Community Resources/Discharge Planning.      Therapeutic Interventions/Treatment Strategies:  Psychotherapist offered support, feedback and validation, set limits, provided redirection, reinforced use of skills and -. Treatment modalities used include Cognitive Behavioral Therapy.    Assessment:    Patient response:   Patient responded to session by accepting feedback, giving feedback, " listening, focusing on goals, being attentive and appearing alert    Possible barriers to participation / learning include: severity of symptoms    Health Issues:   None reported       Substance Use Review:   Substance Use: No active concerns identified.    Mental Status/Behavioral Observations  Appearance:   Appropriate   Eye Contact:   Good   Psychomotor Behavior: Normal   Attitude:   Cooperative   Orientation:   All  Speech   Rate / Production: Normal    Volume:  Normal   Mood:    Normal  Affect:    Appropriate   Thought Content:   Rumination and he has had homicidal thoughts, but no intent to harm himself or others today  Thought Form:  Coherent  Logical     Insight:    Good     Plan:     Safety Plan: No current safety concerns identified.  Recommended that patient call 911 or go to the local ED should there be a change in any of these risk factors.     Barriers to treatment: None identified    Patient Contracts (see media tab):  None    Substance Use: Not addressed in session     Continue or Discharge: Patient will continue in Day Treatment (DT)  as planned. Patient is likely to benefit from learning and using skills as they work toward the goals identified in their treatment plan.      Concepcion Carrillo PsyD  November 14, 2019

## 2019-11-18 ENCOUNTER — HOSPITAL ENCOUNTER (OUTPATIENT)
Dept: BEHAVIORAL HEALTH | Facility: CLINIC | Age: 19
End: 2019-11-18
Attending: PSYCHIATRY & NEUROLOGY
Payer: COMMERCIAL

## 2019-11-18 ENCOUNTER — TELEPHONE (OUTPATIENT)
Dept: BEHAVIORAL HEALTH | Facility: CLINIC | Age: 19
End: 2019-11-18

## 2019-11-18 PROCEDURE — G0177 OPPS/PHP; TRAIN & EDUC SERV: HCPCS

## 2019-11-18 PROCEDURE — 90853 GROUP PSYCHOTHERAPY: CPT | Performed by: PSYCHOLOGIST

## 2019-11-18 NOTE — GROUP NOTE
EBP Group Note    PATIENT'S NAME: Remy Holloway  MRN:   [id number]  :   2000  ACCT. NUMBER: [id number]  DATE OF SERVICE: 19  START TIME: 10:00 AM  END TIME: 10:50 AM  FACILITATOR: Concepcion Carrillo PsyD  TOPIC:  EBP Group: Mindfulness  Adult Mental Health Day Treatment  TRACK: 2A    NUMBER OF PARTICIPANTS: 6    Summary of Group / Topics Discussed:  Mindfulness: Mindfulness Skills: Patients received information on the main components of mindfulness. Patients participated in discussion on how to practice observing, describing, and participating in internal and external environments. Relevance of mindfulness skills to overall mental and physical health was explored.  Patients explored and discussed in group their current awareness and knowledge of mindfulness skills as well as barriers to applying skills.    Patient Session Goals / Objectives:    Demonstrated and verbalized understanding of key mindfulness concepts    Identified when/how to use mindfulness skills    Resolved barriers to practicing mindfulness skills    Identified plan to use mindfulness skills in daily life       Patient Participation / Response:  Fully participated with the group by sharing personal reflections / insights and openly received / provided feedback with other participants.    Demonstrated understanding of mindfulness skills and benefits of practice    Treatment Plan:  Patient has a current master individualized treatment plan.  See Epic treatment plan for more information.       Yoana Alfredo., D,  L.P.

## 2019-11-18 NOTE — GROUP NOTE
"Process Group Note    PATIENT'S NAME: Remy Holloway  MRN:   [id number]  :   2000  ACCT. NUMBER: [id number]  DATE OF SERVICE: 19  START TIME:  9:00 AM  END TIME:  9:50 AM  FACILITATOR: Concepcion Carrillo PsyD  TOPIC:  Process Group    Diagnoses:  Client Initial Individualized Goals for Treatment: \"coping skills for anger.\"       Transgender: SHE or they/them : preferred pronouns  [facility]     Psychiatrist Dr Reina Rose MD, [facility].   -Nicolette Cook @ [facility]   Therapist: Jaylin Samson In [place]  PCP:  Dr Lana Faustin @ [facility]     Diagnosis:    Autism Spectrum Disorder 299.00(F84.0)  Associated with another neurodevelopmental, mental or behavioral disorder, Attention-Deficit/Hyperactivity Disorder  314.01 (F90.9) Unspecified Attention -Deficit / Hyperactivity Disorder and Specific Learning Disorder 315.2 (F81.81) With impairment in written expression grammar and punctuation accuracy  296.43 Bipolar I Disorder Current or Most Recent Episode Manic, Severe   300.00 (F41.9) Unspecified Anxiety Disorder  302.85 (F64.1) Gender dysphoria in Adolescents and Adults  With a disorder of   sex development    Adult Mental Health Day Treatment  TRACK: 2A    NUMBER OF PARTICIPANTS: 6          Data:          Session content: At the start of this group, patients were invited to check in by identifying themselves, describing their current emotional status, and identifying issues to address in this group.   Area(s) of treatment focus addressed in this session included Symptom Management, Personal Safety and Community Resources/Discharge Planning.    Seema reported feeling upset on the weekend and related it to the [employer] [place] [employer] [place]  Losing.  Seema reported problems with managing the irritability and talked to their mom.  Seema reported going running with some friends and that was helpful. Seema reported  That their ILS worker is looking at a group that meets on " Current Events and is for  Those who are disabled.    Therapeutic Interventions/Treatment Strategies:  Psychotherapist offered support, feedback and validation and provided redirection. Treatment modalities used include Cognitive Behavioral Therapy.    Assessment:    Patient response:   Patient responded to session by accepting feedback, giving feedback, listening, focusing on goals, being attentive and accepting support    Possible barriers to participation / learning include: severity of symptoms and sometimes off target and re-directed to the topic of conversation    Health Issues:   None reported       Substance Use Review:   Substance Use: No active concerns identified.    Mental Status/Behavioral Observations  Appearance:   Appropriate   Eye Contact:   Good   Psychomotor Behavior: Restless   Attitude:   Cooperative   Orientation:   All  Speech   Rate / Production: Normal    Volume:  Normal   Mood:    Anxious  Normal  Affect:    Appropriate   Thought Content:   Rumination  Thought Form:  Coherent  Logical     Insight:    Good     Plan:     Safety Plan: Recommended that patient call 911 or go to the local ED should there be a change in any of these risk factors.     Barriers to treatment: None identified    Patient Contracts (see media tab):  None    Substance Use: Not addressed in session     Continue or Discharge: Patient will continue in Day Treatment (DT)  as planned. Patient is likely to benefit from learning and using skills as they work toward the goals identified in their treatment plan.     Stabilization of symptoms needed      Yoana Alfredo., D,  L.P.  November 18, 2019

## 2019-11-18 NOTE — GROUP NOTE
RN Group Note    PATIENT'S NAME: Remy Holloway  MRN:   [id number]  :   2000  ACCT. NUMBER: [id number]  DATE OF SERVICE: 19  START TIME: 11:00 AM  END TIME: 11:50 AM  FACILITATOR: Dian Robles RN  TOPIC: ANJU RN Group: Health Maintenance  Adult Mental Health Day Treatment  TRACK: 2A    NUMBER OF PARTICIPANTS: 5    Summary of Group / Topics Discussed:  Health Maintenance: Wellness Check-in: Patients met with group facilitator to individually review a holistic wellness check-in to assess patient medication adherence/concerns, appointments, physical and mental health, exercise, nutrition, sleep, socialization, substance use, and need for service/resource referrals.       Patient Session Goals / Objectives:    Discussed various aspects of health management and self-care related to physical and mental health    Demonstrated increased self-awareness of current wellness needs    Developed health literacy skills in navigating the healthcare system and self-advocacy/communicating needs with health care team          Patient Participation / Response:  Fully participated with the group by sharing personal reflections / insights and openly received / provided feedback with other participants.    Demonstrated understanding of topics discussed through group discussion and participation    Treatment Plan:  Patient has a current master individualized treatment plan.  See Epic treatment plan for more information.    Dian Robles RN

## 2019-11-19 ENCOUNTER — TELEPHONE (OUTPATIENT)
Dept: BEHAVIORAL HEALTH | Facility: CLINIC | Age: 19
End: 2019-11-19

## 2019-11-19 ENCOUNTER — HOSPITAL ENCOUNTER (EMERGENCY)
Facility: CLINIC | Age: 19
Discharge: HOME | End: 2019-11-19
Attending: PSYCHIATRY & NEUROLOGY | Admitting: PSYCHIATRY & NEUROLOGY
Payer: COMMERCIAL

## 2019-11-19 ENCOUNTER — HOSPITAL ENCOUNTER (OUTPATIENT)
Dept: BEHAVIORAL HEALTH | Facility: CLINIC | Age: 19
End: 2019-11-19
Attending: PSYCHIATRY & NEUROLOGY
Payer: COMMERCIAL

## 2019-11-19 VITALS
TEMPERATURE: 98.1 F | DIASTOLIC BLOOD PRESSURE: 57 MMHG | RESPIRATION RATE: 16 BRPM | OXYGEN SATURATION: 97 % | HEIGHT: 71 IN | HEART RATE: 65 BPM | BODY MASS INDEX: 29.4 KG/M2 | WEIGHT: 210 LBS | SYSTOLIC BLOOD PRESSURE: 122 MMHG

## 2019-11-19 DIAGNOSIS — F31.61 MILD MIXED BIPOLAR I DISORDER (H): ICD-10-CM

## 2019-11-19 DIAGNOSIS — F34.81 DMDD (DISRUPTIVE MOOD DYSREGULATION DISORDER) (H): ICD-10-CM

## 2019-11-19 DIAGNOSIS — F84.0 AUTISM: ICD-10-CM

## 2019-11-19 DIAGNOSIS — F64.9 GENDER DYSPHORIA: ICD-10-CM

## 2019-11-19 DIAGNOSIS — F90.2 ATTENTION DEFICIT HYPERACTIVITY DISORDER, COMBINED TYPE: ICD-10-CM

## 2019-11-19 LAB
AMPHETAMINES UR QL SCN: NEGATIVE
BARBITURATES UR QL: NEGATIVE
BENZODIAZ UR QL: NEGATIVE
CANNABINOIDS UR QL SCN: NEGATIVE
COCAINE UR QL: NEGATIVE
ETHANOL UR QL SCN: NEGATIVE
OPIATES UR QL SCN: NEGATIVE

## 2019-11-19 PROCEDURE — 90791 PSYCH DIAGNOSTIC EVALUATION: CPT

## 2019-11-19 PROCEDURE — 99284 EMERGENCY DEPT VISIT MOD MDM: CPT | Mod: Z6 | Performed by: PSYCHIATRY & NEUROLOGY

## 2019-11-19 PROCEDURE — 80320 DRUG SCREEN QUANTALCOHOLS: CPT | Performed by: FAMILY MEDICINE

## 2019-11-19 PROCEDURE — 99285 EMERGENCY DEPT VISIT HI MDM: CPT | Mod: 25

## 2019-11-19 PROCEDURE — 80307 DRUG TEST PRSMV CHEM ANLYZR: CPT | Performed by: FAMILY MEDICINE

## 2019-11-19 ASSESSMENT — ENCOUNTER SYMPTOMS
AGITATION: 1
NERVOUS/ANXIOUS: 1
RESPIRATORY NEGATIVE: 1
HALLUCINATIONS: 0
ACTIVITY CHANGE: 1
CARDIOVASCULAR NEGATIVE: 1
EYES NEGATIVE: 1
NEUROLOGICAL NEGATIVE: 1
DECREASED CONCENTRATION: 1
MUSCULOSKELETAL NEGATIVE: 1

## 2019-11-19 ASSESSMENT — MIFFLIN-ST. JEOR: SCORE: 1984.68

## 2019-11-19 NOTE — ED PROVIDER NOTES
History     Chief Complaint   Patient presents with     Suicidal     Chronic SI with no plan, getting worst since last week, stressor are change in season and new schedule     Homicidal     Homicidal thoughts towards previous PCA, this PCA person already moved out of state     HPI  Remy Holloway is a 19 year old male to female gender dysphoric individual (Seema) who was sent down from Adult Day Treatment as she was emotionally and behaviorally dysregulated. Patient has history of autism, and DMDD versus bipolar illness. She is followed by Dr Brink. Patient's parents are legal guardian. Day treatment staff felt patient is too unstable for their program and she would benefit from admission for med adjustment to stabilize her. She allegedly has had suicidal thoughts during moments of dysregulation and constant thoughts of harm to a former PCA who had differing views of Anab[employer]ist. Father had fired the PCA a while ago. Patient has been calm since arrival. Mother was notified and she feels that patient douglas snot need admission. Patient herself does not feel unsafe nor having intent to harm anyone and does not feel she needs to be hospitalized. Father will be coming to pick patient up.    Please see DEC Crisis Assessment on 11/19/19 in Epic for further details.    PERSONAL MEDICAL HISTORY  Past Medical History:   Diagnosis Date     ADHD      Amblyopia, unspecified      Bipolar 1 disorder (H)      Esotropia, unspecified      Gender dysphoria      Lack of normal physiological development, unspecified     Normal chromosomes, nl MRI, neg fragile X     Learning disabilities      Redundant prepuce and phimosis     Penile coronal adhesions-repaired     Umbilical hernia without mention of obstruction or gangrene     repaired     Unspecified otitis media     Recurrent     PAST SURGICAL HISTORY  Past Surgical History:   Procedure Laterality Date     DENTAL SURGERY      wisdom teeth removed     HC CREATE EARDRUM OPENING,GEN ANESTH   8/2000    P.E. Tubes - removed 2002     HC REPAIR, INCOMPLETE CIRCUMCISION  2000    Recircumcised     HERNIA REPAIR, UMBILICAL  2000    repair umbilical hernia     FAMILY HISTORY  Family History   Problem Relation Age of Onset     Migraines Mother      Breast Cancer Natural parent         Mother     Depression Paternal Grandfather      SOCIAL HISTORY  Social History     Tobacco Use     Smoking status: Never Smoker     Smokeless tobacco: Never Used   Substance Use Topics     Alcohol use: No     MEDICATIONS  No current facility-administered medications for this encounter.      Current Outpatient Medications   Medication     acetylcysteine (N-ACETYL CYSTEINE) 600 MG CAPS capsule     clonazePAM (KLONOPIN) 1 MG tablet     guanFACINE HCl (INTUNIV) 4 MG TB24     hydrOXYzine (ATARAX) 25 MG tablet     lithium (ESKALITH) 450 MG CR tablet     lithium (ESKALITH/LITHOBID) 300 MG CR tablet     loratadine (CLARITIN) 10 MG capsule     lurasidone (LATUDA) 80 MG TABS tablet     melatonin 3 MG tablet     metFORMIN (GLUCOPHAGE) 1000 MG tablet     polyethylene glycol (MIRALAX/GLYCOLAX) powder     QUEtiapine (SEROQUEL) 400 MG tablet     QUEtiapine (SEROQUEL) 50 MG tablet     sodium chloride (OCEAN) 0.65 % nasal spray     ALLERGIES  Allergies   Allergen Reactions     Gluten Meal Other (See Comments)     No Known Allergies          I have reviewed the Medications, Allergies, Past Medical and Surgical History, and Social History in the Epic system.    Review of Systems   Constitutional: Positive for activity change.   HENT: Negative.    Eyes: Negative.    Respiratory: Negative.    Cardiovascular: Negative.    Genitourinary: Negative.    Musculoskeletal: Negative.    Neurological: Negative.    Psychiatric/Behavioral: Positive for agitation, behavioral problems, decreased concentration and suicidal ideas. Negative for hallucinations. The patient is nervous/anxious.    All other systems reviewed and are negative.      Physical Exam   BP: (!)  "142/81  Pulse: 92  Temp: 96  F (35.6  C)  Resp: 16  Height: 180.3 cm (5' 11\")  Weight: 95.3 kg (210 lb)  SpO2: 99 %      Physical Exam  Vitals signs and nursing note reviewed.   Constitutional:       Appearance: Normal appearance.   HENT:      Head: Normocephalic and atraumatic.      Nose: Nose normal.      Mouth/Throat:      Mouth: Mucous membranes are moist.   Eyes:      Pupils: Pupils are equal, round, and reactive to light.   Neck:      Musculoskeletal: Normal range of motion.   Cardiovascular:      Rate and Rhythm: Normal rate and regular rhythm.   Pulmonary:      Effort: Pulmonary effort is normal.      Breath sounds: Normal breath sounds.   Abdominal:      General: Abdomen is flat.   Musculoskeletal: Normal range of motion.   Skin:     General: Skin is warm.   Neurological:      General: No focal deficit present.      Mental Status: He is alert and oriented to person, place, and time.   Psychiatric:         Attention and Perception: Perception normal. He is inattentive. He does not perceive auditory or visual hallucinations.         Mood and Affect: Mood normal. Affect is blunt.         Speech: Speech normal.         Behavior: Behavior normal. Behavior is cooperative.         Thought Content: Thought content is not paranoid or delusional.         Cognition and Memory: Cognition normal.         Judgment: Judgment is impulsive.         ED Course        Procedures             Labs Ordered and Resulted from Time of ED Arrival Up to the Time of Departure from the ED   DRUG ABUSE SCREEN 6 CHEM DEP URINE ([facility])            Assessments & Plan (with Medical Decision Making)   Patient with gender dysphoria and autism who has struggled with recent transitions. He appears back at baseline. He does not warrant a hold and be admitted. He can be discharged to parent's care as they wish. Patient is recommended to follow-up established care and services.    I have reviewed the nursing notes.    I have reviewed the findings, " diagnosis, plan and need for follow up with the patient.    New Prescriptions    No medications on file       Final diagnoses:   Gender dysphoria   DMDD (disruptive mood dysregulation disorder) (H)   Autism       11/19/2019   [facility], [place], EMERGENCY DEPARTMENT     Blaze Rosas MD  11/19/19 6812

## 2019-11-19 NOTE — DISCHARGE INSTRUCTIONS
Please follow-up established care and services  Continue with adult day treatment for further support  Follow-up Dr Rose for further medication management and monitoring

## 2019-11-19 NOTE — TELEPHONE ENCOUNTER
"Phone call from dad, who is legal guardian, parents are legal guardians  Coordinate care for Seema, who feels unsafe, woke up feeling more agitation.  Seema reported increased suicidal and homicidal or \" angry\" thoughts of harming past PCA who Seema saw  In [place].    Patti Alfredo, D,  Licensed Psychologist    "

## 2019-11-19 NOTE — ED AVS SNAPSHOT
[facility], [place], Emergency Department  [address]  [place] [place] [address]  Phone:  [phone]  Fax:  [phone]                                    Remy Holloway   MRN: [id number]    Department:  [facility], Emergency Department   Date of Visit:  11/19/2019           After Visit Summary Signature Page    I have received my discharge instructions, and my questions have been answered. I have discussed any challenges I see with this plan with the nurse or doctor.    ..........................................................................................................................................  Patient/Patient Representative Signature      ..........................................................................................................................................  Patient Representative Print Name and Relationship to Patient    ..................................................               ................................................  Date                                   Time    ..........................................................................................................................................  Reviewed by Signature/Title    ...................................................              ..............................................  Date                                               Time          22EPIC Rev 08/18

## 2019-11-21 ENCOUNTER — HOSPITAL ENCOUNTER (OUTPATIENT)
Dept: BEHAVIORAL HEALTH | Facility: CLINIC | Age: 19
End: 2019-11-21
Attending: PSYCHIATRY & NEUROLOGY
Payer: COMMERCIAL

## 2019-11-21 PROCEDURE — 90853 GROUP PSYCHOTHERAPY: CPT | Performed by: PSYCHOLOGIST

## 2019-11-21 PROCEDURE — G0177 OPPS/PHP; TRAIN & EDUC SERV: HCPCS

## 2019-11-21 NOTE — GROUP NOTE
"Process Group Note    PATIENT'S NAME: Remy Holloway  MRN:   [id number]  :   2000  ACCT. NUMBER: [id number]  DATE OF SERVICE: 19  START TIME:  9:00 AM  END TIME:  9:50 AM  FACILITATOR: Concepcion Carrillo PsyD  TOPIC:  Process Group    Diagnoses:  Client Initial Individualized Goals for Treatment: \"coping skills for anger.\"       Transgender: SHE or they/them : preferred pronouns  [facility]     Psychiatrist Dr Reina Rose MD, [facility].   -Nicolette Cook @ [facility]   Therapist: Jaylin Samson In [place]  PCP:  Dr Lana Faustin @ [facility]     Diagnosis:    Autism Spectrum Disorder 299.00(F84.0)  Associated with another neurodevelopmental, mental or behavioral disorder, Attention-Deficit/Hyperactivity Disorder  314.01 (F90.9) Unspecified Attention -Deficit / Hyperactivity Disorder and Specific Learning Disorder 315.2 (F81.81) With impairment in written expression grammar and punctuation accuracy  296.43 Bipolar I Disorder Current or Most Recent Episode Manic, Severe   300.00 (F41.9) Unspecified Anxiety Disorder  302.85 (F64.1) Gender dysphoria in Adolescents and Adults  With a disorder of   sex development    Adult Mental Health Day Treatment  TRACK: 2A    NUMBER OF PARTICIPANTS: 6          Data:    Session content: At the start of this group, patients were invited to check in by identifying themselves, describing their current emotional status, and identifying issues to address in this group.   Area(s) of treatment focus addressed in this session included Symptom Management, Personal Safety and Community Resources/Discharge Planning.    Seema reported being safe today. Seema reported the troubles in the ED and talking with the hospital staff and parents about all the symptoms of depression, anxiety, feeling unsafe, suicidal and homicidal thoughts, and hopelessness.  Seema relayed information between the [facility] staff and parents and eventually decided to go home and not be " admitted. Seema reported another psychiatry appointment tomorrow and was encouraged to talk about symptoms and medications and get Lithium levels checked.  Seema talked about transitions and schedule changes and that a new psychiatrist is being scheduled, since Dr. Rose is only a pediatric psychiatrist and this is upsetting to Seema.     Therapeutic Interventions/Treatment Strategies:  Psychotherapist offered support, feedback and validation, provided redirection and reinforced use of skills. Treatment modalities used include Cognitive Behavioral Therapy. Interventions include Cognitive Restructuring:  Explored impact of ineffective thoughts / distortions on mood and activity and Assisted patient in formulating new neutral/positive alternatives to challenge less helpful / ineffective thoughts and Coping Skills: Reviewed patients current calming practices and discussed a more formal way of practicing and accessing skills.    Assessment:    Patient response:   Patient responded to session by accepting feedback, giving feedback, listening, focusing on goals, being attentive and accepting support    Possible barriers to participation / learning include: severity of symptoms    Health Issues:   None reported       Substance Use Review:   Substance Use: No active concerns identified.    Mental Status/Behavioral Observations  Appearance:   Appropriate   Eye Contact:   Good   Psychomotor Behavior: Normal   Attitude:   Cooperative   Orientation:   All  Speech   Rate / Production: Normal    Volume:  Normal   Mood:    Anxious  Sad   Affect:    Appropriate   Thought Content:   Rumination  Thought Form:  Coherent  Logical     Insight:    Good     Plan:     Safety Plan: Recommended that patient call 911 or go to the local ED should there be a change in any of these risk factors.     Barriers to treatment: None identified    Patient Contracts (see media tab):  None    Substance Use: discussed need to address changes in the body  and changes in the way that medications act, and how they can wear off after time with psychiatirist     Continue or Discharge: Patient will continue in Day Treatment (DT)  as planned. Patient is likely to benefit from learning and using skills as they work toward the goals identified in their treatment plan.     Stabilization of symptoms is needed.         Yoana Alfredo., D,  Licensed Psychologist

## 2019-11-21 NOTE — GROUP NOTE
Life Skills/OT Group Note    PATIENT'S NAME: eRmy Holloway  MRN:   [id number]  :   2000  ACCT. NUMBER: [id number]  DATE OF SERVICE: 19  START TIME: 10:00 AM  END TIME: 10:50 AM  FACILITATOR: Bryan Gerber OT  TOPIC:  Life Skills Group: Sensory Approaches in Mental Health  Adult Mental Health Day Treatment  TRACK: 2A    NUMBER OF PARTICIPANTS: 6    Summary of Group / Topics Discussed:  Sensory Approaches in Mental Health:  Coping Through the Senses Introduction: Patients were introduced and taught about neurosensory based skills and strategies related to supporting effective self regulation skills.  Patients were taught about the eight sensory systems and how they can be used for coping with mental health symptoms and stressors.  Patients were provided with an experiential opportunity to increase self-awareness of helpful sensory input and self care activities. Patients were introduced on how to create supportive environments that encourage use of these skills.         Patient Session Goals / Objectives:    Identified specific and individualized neurosensory skills to help when distressed      Identified skills learned and how this applies to current daily life    Established a plan for practice of these skills in their own environments        Patient Participation / Response:  Moderately participated, sharing some personal reflections / insights and adequately adequately received / provided feedback with other participants.    Verbalized understanding of content and Patient would benefit from additional opportunities to practice the content to be able to generalize it to their everyday life with increased intentionality, consistency, and efficacy in support of their psychiatric recovery    Treatment Plan:  Patient has a current master individualized treatment plan.  See Epic treatment plan for more information.    Bryan Gerber OT

## 2019-11-21 NOTE — GROUP NOTE
Life Skills/OT Group Note    PATIENT'S NAME: Remy Holloway  MRN:   [id number]  :   2000  ACCT. NUMBER: [id number]  DATE OF SERVICE: 19  START TIME: 11:00 AM  END TIME: 11:50 AM  FACILITATOR: Lpoez Gray OTR/L  TOPIC:  Life Skills Group: Cognitive Functioning  Adult Mental Health Day Treatment  TRACK: 2A    NUMBER OF PARTICIPANTS: 6    Summary of Group / Topics Discussed:  Cognitive Functioning (Memory Compensatory Strategies): Patients were taught and provided with an opportunity to gain awareness of how their mental health symptoms impact their current cognitive functioning as well as how this impacts their performance and participation in meaningful roles, relationships, and routines.  Patients were taught skills and strategies on how to monitor and improve cognitive performance through remediation or compensatory strategies.  Patients were given opportunities to practice taught skills and techniques in session and how to apply to everyday life.        Patient Session Goals / Objectives:    Identified how their mental health symptoms impact their functioning, focusing on specific cognitive challenges     Improved awareness of specific remediation and/or compensatory strategies to improve  executive functioning skills and how this relates to mental health recovery        Established a plan for practice of these skills in their own environments    Practiced and reflected on how to generalize taught skills to their everyday life          Patient Participation / Response:  Minimally participated, only when prompted / asked.    Patient presentation: Calm,alert with poor focus and concentration.Minimal participation overall. Patient reported he has a doctor appointment tomorrow and Patient would benefit from additional opportunities to practice the content to be able to generalize it to their everyday life with increased intentionality, consistency, and efficacy in support of their psychiatric  recovery    Treatment Plan:  Patient has a current master individualized treatment plan.  See Epic treatment plan for more information.    Lopez Gray, OTR/L

## 2019-11-25 ENCOUNTER — HOSPITAL ENCOUNTER (OUTPATIENT)
Dept: BEHAVIORAL HEALTH | Facility: CLINIC | Age: 19
End: 2019-11-25
Attending: PSYCHIATRY & NEUROLOGY
Payer: COMMERCIAL

## 2019-11-25 PROCEDURE — 90853 GROUP PSYCHOTHERAPY: CPT | Performed by: PSYCHOLOGIST

## 2019-11-25 PROCEDURE — G0177 OPPS/PHP; TRAIN & EDUC SERV: HCPCS

## 2019-11-25 NOTE — GROUP NOTE
RN Group Note    PATIENT'S NAME: Remy Holloway  MRN:   [id number]  :   2000  ACCT. NUMBER: [id number]  DATE OF SERVICE: 19  START TIME: 11:00 AM  END TIME: 11:50 AM  FACILITATOR: Dian Robles RN  TOPIC: ANJU RN Group: Health Maintenance  Adult Mental Health Day Treatment  TRACK: 2A    NUMBER OF PARTICIPANTS: 5    Summary of Group / Topics Discussed:  Health Maintenance: Wellness Check-in: Patients met with group facilitator to individually review a holistic wellness check-in to assess patient medication adherence/concerns, appointments, physical and mental health, exercise, nutrition, sleep, socialization, substance use, and need for service/resource referrals.       Patient Session Goals / Objectives:    Discussed various aspects of health management and self-care related to physical and mental health    Demonstrated increased self-awareness of current wellness needs    Developed health literacy skills in navigating the healthcare system and self-advocacy/communicating needs with health care team          Patient Participation / Response:  Fully participated with the group by sharing personal reflections / insights and openly received / provided feedback with other participants.    Demonstrated understanding of topics discussed through group discussion and participation    Treatment Plan:  Patient has a current master individualized treatment plan.  See Epic treatment plan for more information.    Dian Robles RN

## 2019-11-25 NOTE — GROUP NOTE
EBP Group Note    PATIENT'S NAME: Remy Holloway  MRN:   [id number]  :   2000  ACCT. NUMBER: [id number]  DATE OF SERVICE: 19  START TIME: 10:00 AM  END TIME: 10:50 AM  FACILITATOR: Concepcion Carrillo PsyD  TOPIC:  EBP Group: Coping Skills  Adult Mental Health Day Treatment  TRACK: 2A    NUMBER OF PARTICIPANTS: 5    Summary of Group / Topics Discussed:  Coping Skills: Distraction: Patients learned to mindfully use distraction as a way to decrease heightened stress in the moment.  Patients will identified situations that necessitate healthy distraction strategies.  They explored ways to manage physical symptoms of distress using distraction. The group began to distinguish when this can be useful in their lives or when other strategies would be more relevant or helpful.    Patient Session Goals / Objectives:    Understand the purpose and benefits of using healthy distraction to decrease distress.    Process what happens in the body when using distraction strategies.    Demonstrate understanding of when to use distraction strategies.    Explore patient s current distraction activities, and how to take a more intentional approach to the use of distraction.    Identify and problem solve barriers to applying distraction strategies.    Choose 1-2 healthy distraction strategies to apply during times of distress.        Patient Participation / Response:  Fully participated with the group by sharing personal reflections / insights and openly received / provided feedback with other participants.    Demonstrated understanding of topics discussed through group discussion and participation and Expressed understanding of the relevance / importance of coping skills at distressing times in life    Treatment Plan:  Patient has a current master individualized treatment plan.  See Epic treatment plan for more information.    Patti Aflredo, APOLINAR,  Licensed Psychologist

## 2019-11-25 NOTE — GROUP NOTE
Process Group Note    PATIENT'S NAME: Remy Holloway  MRN:   [id number]  :   2000  ACCT. NUMBER: [id number]  DATE OF SERVICE: 19  START TIME:  9:00 AM  END TIME:  9:50 AM  FACILITATOR: Concepcion Carrillo PsyD  TOPIC:  Process Group    Diagnoses:    Psychiatrist Dr Reina Rose MD, [facility].   -Nicolette Cook @ [employer]   Therapist: Jaylin Samson In [place]  PCP:  Dr Lana Faustin @ [facility]     Diagnosis:    Autism Spectrum Disorder 299.00(F84.0)  Associated with another neurodevelopmental, mental or behavioral disorder, Attention-Deficit/Hyperactivity Disorder  314.01 (F90.9) Unspecified Attention -Deficit / Hyperactivity Disorder and Specific Learning Disorder 315.2 (F81.81) With impairment in written expression grammar and punctuation accuracy  296.43 Bipolar I Disorder Current or Most Recent Episode Manic, Severe   300.00 (F41.9) Unspecified Anxiety Disorder  302.85 (F64.1) Gender dysphoria in Adolescents and Adults  With a disorder of   sex development    Adult Mental Health Day Treatment  TRACK: 2A    NUMBER OF PARTICIPANTS: 5          Data:    Session content: At the start of this group, patients were invited to check in by identifying themselves, describing their current emotional status, and identifying issues to address in this group.   Area(s) of treatment focus addressed in this session included Symptom Management, Personal Safety and Community Resources/Discharge Planning.    Seema reported being safe today. They had difficulty staying in group and felt anxious about a text that was sent to the running , fearing that they sent a bad text to provoke that person Seema talked about sending a provoking text to a friend and the friend got mad and cut off the relationship. Seema was asked to stay in the group, and not to leave for extended periods of time. Seema talked to staff to be certain that staff weren't mad, and staff said that it was part of their  "job to find where people were and to bring them to group, if they are gone for more than 5 minutes.  Seema indicated that they understood.     Therapeutic Interventions/Treatment Strategies:  Psychotherapist offered support, feedback and validation, provided redirection and reinforced use of skills. Treatment modalities used include Cognitive Behavioral Therapy. Interventions include Cognitive Restructuring:  Explored impact of ineffective thoughts / distortions on mood and activity and Assisted patient in formulating new neutral/positive alternatives to challenge less helpful / ineffective thoughts and Coping Skills: Facilitated discussion on learning and applying radical acceptance skill, Discussed use of self-soothe skills to decrease distress in the body and Discussed how the use of intentional \"in the moment\" actions can help reduce distress.    Assessment:    Patient response:   Patient responded to session by accepting feedback, giving feedback, listening, focusing on goals, being attentive, accepting support and interrupting    Possible barriers to participation / learning include: severity of symptoms and restlessness    Health Issues:   None reported       Substance Use Review:   Substance Use: No active concerns identified.    Mental Status/Behavioral Observations  Appearance:   Appropriate   Eye Contact:   Good   Psychomotor Behavior: Normal   Attitude:   Cooperative   Orientation:   All  Speech   Rate / Production: Normal    Volume:  Normal   Mood:    Anxious   Affect:    Appropriate   Thought Content:   Rumination  Thought Form:  Coherent  Logical  Tangential     Insight:    Good     Plan:     Safety Plan: Recommended that patient call 911 or go to the local ED should there be a change in any of these risk factors.     Barriers to treatment: None identified    Patient Contracts (see media tab):  None    Substance Use: Not addressed in session     Continue or Discharge: Patient will continue in Day " Treatment (DT)  as planned. Patient is likely to benefit from learning and using skills as they work toward the goals identified in their treatment plan.         Stabilization of symptoms is needed.    Yoana Alfredo., D,  Licensed Psychologist   November 25, 2019

## 2019-11-25 NOTE — PROGRESS NOTES
Past Medical History:   Diagnosis Date     ADHD      Amblyopia, unspecified      Bipolar 1 disorder (H)      Esotropia, unspecified      Gender dysphoria      Lack of normal physiological development, unspecified     Normal chromosomes, nl MRI, neg fragile X     Learning disabilities      Redundant prepuce and phimosis     Penile coronal adhesions-repaired     Umbilical hernia without mention of obstruction or gangrene     repaired     Unspecified otitis media     Recurrent       Current Outpatient Medications:      acetylcysteine (N-ACETYL CYSTEINE) 600 MG CAPS capsule, Take one po bid, Disp: 60 capsule, Rfl: 3     clonazePAM (KLONOPIN) 1 MG tablet, 0.5 mg 2 times daily as needed Take 0.5 -1 tablet in the morning, and may repeat one additional dose q day prn, Disp: 90 tablet, Rfl: 1     guanFACINE HCl (INTUNIV) 4 MG TB24, Take one daily for ADHD, Disp: 30 tablet, Rfl: 0     hydrOXYzine (ATARAX) 25 MG tablet, Take one po q hs prn insomnia, Disp: 30 tablet, Rfl: 3     lithium (ESKALITH) 450 MG CR tablet, Take a single 450mg tablet po q hs along with a single 300mg tablet at hs, Disp: 90 tablet, Rfl: 3     lithium (ESKALITH/LITHOBID) 300 MG CR tablet, 750 mg 2 times daily Take 2 po q am and one po q hs along with 450mg dose, Disp: 90 tablet, Rfl: 1     loratadine (CLARITIN) 10 MG capsule, Take 10 mg by mouth daily, Disp: 30 capsule, Rfl: 3     lurasidone (LATUDA) 80 MG TABS tablet, Take 160 mg by mouth, Disp: , Rfl:      melatonin 3 MG tablet, Take one po q hs, Disp: 30 tablet, Rfl: 0     metFORMIN (GLUCOPHAGE) 1000 MG tablet, Take 1 tablet (1,000 mg) by mouth 2 times daily (with meals), Disp: , Rfl:      polyethylene glycol (MIRALAX/GLYCOLAX) powder, Take 17 g (1 capful) by mouth daily PRN constipation, Disp: 1 Bottle, Rfl:      QUEtiapine (SEROQUEL) 400 MG tablet, Take 1 tablet (400 mg) by mouth At Bedtime Take 2 (200mg) po q hs, Disp: 30 tablet, Rfl: 3     QUEtiapine (SEROQUEL) 50 MG tablet, 50 mg as needed Take  1-2 po TID prn, Disp: 120 tablet, Rfl: 1     sodium chloride (OCEAN) 0.65 % nasal spray, Spray 2 sprays into both nostrils daily as needed for congestion (Patient not taking: Reported on 2/20/2019), Disp: 30 mL, Rfl: 0  Psychiatry staffing: case discussed  Diagnosis:  ASD, ADHD, Bipolar, anxiety, gender dysphoria.  Agitated lately, restless in group and will leave.  Question ADHD vs akathisia for leaving group

## 2019-11-26 ENCOUNTER — HOSPITAL ENCOUNTER (OUTPATIENT)
Dept: BEHAVIORAL HEALTH | Facility: CLINIC | Age: 19
End: 2019-11-26
Attending: PSYCHIATRY & NEUROLOGY
Payer: COMMERCIAL

## 2019-11-26 PROCEDURE — G0177 OPPS/PHP; TRAIN & EDUC SERV: HCPCS

## 2019-11-26 PROCEDURE — 90853 GROUP PSYCHOTHERAPY: CPT | Performed by: PSYCHOLOGIST

## 2019-11-26 NOTE — GROUP NOTE
Life Skills/OT Group Note    PATIENT'S NAME: Remy Holloway  MRN:   [id number]  :   2000  ACCT. NUMBER: [id number]  DATE OF SERVICE: 19  START TIME:  9:00 AM  END TIME:  9:50 AM  FACILITATOR: Lopez Gray OTR/L  TOPIC:  Life Skills Group: Communication and Social Skills Development  Adult Mental Health Day Treatment  TRACK: 6B    NUMBER OF PARTICIPANTS: 6    Summary of Group / Topics Discussed:  Communication and Social Skills Development: Social Supports: Social Support Scale: Patients completed a social support self assessment to identify people who are supportive and to evaluate the effectiveness of their social support system.  Patients were taught and gained awareness of characteristics of an effective support and how to develop this in their lives.  Patients identified both personal strengths and opportunities for growth in this areas to improve overall communication and connection with other people.    Patient Session Goals / Objectives:    Identified strengths and opportunities for growth in developing their social support system and how this impacts their ability to connect and communicate with other people       Improved awareness of important aspects of social support systems and how this relates to mental health recovery        Established a plan for practice of these skills in their own environments    Practiced and reflected on how to generalize taught skills to their everyday life          Patient Participation / Response:  Minimally participated, only when prompted / asked.    Patient presentation: Calm,alert with stable mood and thought process. Patient expressed not issues or concerns related to his level od social support and Patient would benefit from additional opportunities to practice the content to be able to generalize it to their everyday life with increased intentionality, consistency, and efficacy in support of their psychiatric recovery    Treatment Plan:  Patient  has a current master individualized treatment plan.  See Epic treatment plan for more information.    Lopez Gray, OTR/L

## 2019-11-26 NOTE — GROUP NOTE
Process Group Note    PATIENT'S NAME: Remy Holloway  MRN:   [id number]  :   2000  ACCT. NUMBER: [id number]  DATE OF SERVICE: 19  START TIME: 11:00 AM  END TIME: 11:50 AM  FACILITATOR: Concepcion Carrillo PsyD  TOPIC:  Process Group    Diagnoses:     Psychiatrist Dr Reina Rose MD, [facility].   -Nicolette Cook @ [employer]   Therapist: Jaylin Samson In [place]  PCP:  Dr Lana Faustin @ [facility]     Diagnosis:    Autism Spectrum Disorder 299.00(F84.0)  Associated with another neurodevelopmental, mental or behavioral disorder, Attention-Deficit/Hyperactivity Disorder  314.01 (F90.9) Unspecified Attention -Deficit / Hyperactivity Disorder and Specific Learning Disorder 315.2 (F81.81) With impairment in written expression grammar and punctuation accuracy  296.43 Bipolar I Disorder Current or Most Recent Episode Manic, Severe   300.00 (F41.9) Unspecified Anxiety Disorder  302.85 (F64.1) Gender dysphoria in Adolescents and Adults  With a disorder of   sex development    Adult Mental Health Day Treatment  TRACK: 2A    NUMBER OF PARTICIPANTS: 6          Data:    Session content: At the start of this group, patients were invited to check in by identifying themselves, describing their current emotional status, and identifying issues to address in this group.   Area(s) of treatment focus addressed in this session included Symptom Management, Personal Safety and Community Resources/Discharge Planning.    Seema reported being safe today. She reported a disagreement this morning, but talked to dad and resolved it, and gave staff a card from the psychiatrist and said that it was ok to call the psychiatrist instead of going into the ED.  Seema reported waking a lot at night, due to needing to use the bathroom, and the alarm clock is off one hour, and Seema needs to re-set it.  Seema reported repetitive thoughts, anxiety, and a better mood. Seema reported that she will see her running   on Sunday and a  from the [facility] on Wednesdays. Seema reported that their family will host Johnny and is looking forward to it.    Therapeutic Interventions/Treatment Strategies:  Psychotherapist offered support, feedback and validation and reinforced use of skills. Treatment modalities used include Cognitive Behavioral Therapy. Interventions include Cognitive Restructuring:  Facilitated recognition of the connection between negative thoughts and negative core beliefs, Coping Skills: Discussed use of self-soothe skills to decrease distress in the body and Reviewed patients current calming practices and discussed a more formal way of practicing and accessing skills and Mindfulness: Encouraged a plan to use mindfulness skills in daily life.    Assessment:    Patient response:   Patient responded to session by accepting feedback, giving feedback, listening, focusing on goals, being attentive and accepting support    Possible barriers to participation / learning include: severity of symptoms    Health Issues:   None reported       Substance Use Review:   Substance Use: No active concerns identified.    Mental Status/Behavioral Observations  Appearance:   Appropriate   Eye Contact:   Good   Psychomotor Behavior: Normal   Attitude:   Cooperative   Orientation:   All  Speech   Rate / Production: Normal    Volume:  Normal   Mood:    Anxious   Affect:    Appropriate   Thought Content:   Rumination  Thought Form:  Coherent  Logical     Insight:    Good     Plan:     Safety Plan: Recommended that patient call 911 or go to the local ED should there be a change in any of these risk factors.     Barriers to treatment: None identified    Patient Contracts (see media tab):  None    Substance Use: Not addressed in session     Continue or Discharge: Patient will continue in Day Treatment (DT)  as planned. Patient is likely to benefit from learning and using skills as they work toward the  goals identified in their treatment plan.   Stabilization of symptoms is needed.        Yoana Alfredo., D,  Licensed Psychologist   November 26, 2019

## 2019-11-27 ASSESSMENT — PATIENT HEALTH QUESTIONNAIRE - PHQ9: SUM OF ALL RESPONSES TO PHQ QUESTIONS 1-9: 14

## 2019-11-27 NOTE — ADDENDUM NOTE
Encounter addended by: Pedro Blanton on: 11/27/2019 2:36 PM   Actions taken: Visit Navigator Flowsheet section accepted

## 2019-11-28 ASSESSMENT — ANXIETY QUESTIONNAIRES: GAD7 TOTAL SCORE: 12

## 2019-12-02 ENCOUNTER — HOSPITAL ENCOUNTER (OUTPATIENT)
Dept: BEHAVIORAL HEALTH | Facility: CLINIC | Age: 19
End: 2019-12-02
Attending: PSYCHIATRY & NEUROLOGY
Payer: COMMERCIAL

## 2019-12-02 PROCEDURE — 90853 GROUP PSYCHOTHERAPY: CPT | Performed by: PSYCHOLOGIST

## 2019-12-02 PROCEDURE — G0177 OPPS/PHP; TRAIN & EDUC SERV: HCPCS

## 2019-12-02 NOTE — GROUP NOTE
EBP Group Note    PATIENT'S NAME: Remy Holloway  MRN:   [id number]  :   2000  [id number]  DATE OF SERVICE: 19  START TIME: 10:00 AM  END TIME: 10:50 AM  FACILITATOR: Concepcion Carrillo PsyD  TOPIC:  EBP Group: Behavioral Activation  Adult Mental Health Day Treatment  TRACK: 2A    NUMBER OF PARTICIPANTS: 7    Summary of Group / Topics Discussed:  Behavioral Activation: The Change Process - Behavior Change: Patients explored the process and types of change, including but not limited to, theories of change, steps to making change, methods of changing behavior, and potential barriers.  Patients worked to identify what changes may benefit their daily lives, and work towards a plan to implement change.      Patient Session Goals / Objectives:    Demonstrate understanding of the change process.      Identify positive and negative behavioral patterns.    Make plans to track and implement changes and share experiences in group.    Identify personal barriers to change      Patient Participation / Response:  Fully participated with the group by sharing personal reflections / insights and openly received / provided feedback with other participants.    Demonstrated understanding of topics discussed through group discussion and participation and Expressed understanding of the relationship between behaviors, thoughts, and feelings    Treatment Plan:  Patient has a current master individualized treatment plan.  See Epic treatment plan for more information.  Patti Alfredo, D,  Licensed Psychologist

## 2019-12-02 NOTE — GROUP NOTE
Process Group Note    PATIENT'S NAME: Remy Holloway  MRN:   [id number]  :   2000  ACCT. NUMBER: [id number]  DATE OF SERVICE: 19  START TIME:  9:00 AM  END TIME:  9:50 AM  FACILITATOR: Concepcoin Carrillo PsyD  TOPIC:  Process Group    Diagnoses:  Psychiatrist Dr Reina Rose MD, [facility].   -Nicolette Cook @ [employer]   Therapist: Jaylin Samson In [place]  PCP:  Dr Lana Faustin @ [facility]     Diagnosis:    Autism Spectrum Disorder 299.00(F84.0)  Associated with another neurodevelopmental, mental or behavioral disorder, Attention-Deficit/Hyperactivity Disorder  314.01 (F90.9) Unspecified Attention -Deficit / Hyperactivity Disorder and Specific Learning Disorder 315.2 (F81.81) With impairment in written expression grammar and punctuation accuracy  296.43 Bipolar I Disorder Current or Most Recent Episode Manic, Severe   300.00 (F41.9) Unspecified Anxiety Disorder  302.85 (F64.1) Gender dysphoria in Adolescents and Adults  With a disorder of   sex development    Adult Mental Health Day Treatment  TRACK: 2A    NUMBER OF PARTICIPANTS: 6          Data:    Session content: At the start of this group, patients were invited to check in by identifying themselves, describing their current emotional status, and identifying issues to address in this group.   Area(s) of treatment focus addressed in this session included Symptom Management, Personal Safety and Community Resources/Discharge Planning.    Seema reported being safe today. Seema talked to the group about problem-solving situations around the family, the holiday, and brother's conversations. Seema reported changing plans to remain in a calmer state of mind.     Therapeutic Interventions/Treatment Strategies:  Psychotherapist offered support, feedback and validation and reinforced use of skills. Treatment modalities used include Cognitive Behavioral Therapy. Interventions include Cognitive Restructuring:  Assisted patient in  formulating new neutral/positive alternatives to challenge less helpful / ineffective thoughts and Coping Skills: Reviewed patients current calming practices and discussed a more formal way of practicing and accessing skills and Assisted patient in understanding the purpose of planning / creating / participating / sharing in positive experiences.    Assessment:    Patient response:   Patient responded to session by accepting feedback, giving feedback, listening, focusing on goals, being attentive and accepting support    Possible barriers to participation / learning include: severity of symptoms    Health Issues:   None reported       Substance Use Review:   Substance Use: No active concerns identified.    Mental Status/Behavioral Observations  Appearance:   Appropriate   Eye Contact:   Good   Psychomotor Behavior: Restless   Attitude:   Cooperative   Orientation:   All  Speech   Rate / Production: Normal    Volume:  Normal   Mood:    Anxious  Depressed  Normal  Affect:    Constricted   Thought Content:   Rumination  Thought Form:  Coherent  Logical     Insight:    Good     Plan:     Safety Plan: Recommended that patient call 911 or go to the local ED should there be a change in any of these risk factors.     Barriers to treatment: None identified    Patient Contracts (see media tab):  None    Substance Use: Not addressed in session     Continue or Discharge: Patient will continue in Day Treatment (DT)  as planned. Patient is likely to benefit from learning and using skills as they work toward the goals identified in their treatment plan.     Stabilization of symptoms is needed.        Patti Alfredo, D,  Licensed Psychologist    December 2, 2019

## 2019-12-02 NOTE — GROUP NOTE
RN Group Note    PATIENT'S NAME: Remy Holloway  MRN:   [id number]  :   2000  ACCT. NUMBER: [id number]  DATE OF SERVICE: 19  START TIME: 11:00 AM  END TIME: 11:50 AM  FACILITATOR: Dian Robles RN  TOPIC: ANJU RN Group: Health Maintenance  Adult Mental Health Day Treatment  TRACK: 2A    NUMBER OF PARTICIPANTS: 5    Summary of Group / Topics Discussed:  Health Maintenance: Wellness Check-in: Patients met with group facilitator to individually review a holistic wellness check-in to assess patient medication adherence/concerns, appointments, physical and mental health, exercise, nutrition, sleep, socialization, substance use, and need for service/resource referrals.       Patient Session Goals / Objectives:    Discussed various aspects of health management and self-care related to physical and mental health    Demonstrated increased self-awareness of current wellness needs    Developed health literacy skills in navigating the healthcare system and self-advocacy/communicating needs with health care team          Patient Participation / Response:  Fully participated with the group by sharing personal reflections / insights and openly received / provided feedback with other participants.    Demonstrated understanding of topics discussed through group discussion and participation    Treatment Plan:  Patient has a current master individualized treatment plan.  See Epic treatment plan for more information.    Dian Robles RN

## 2019-12-03 ENCOUNTER — OFFICE VISIT (OUTPATIENT)
Dept: FAMILY MEDICINE | Facility: CLINIC | Age: 19
End: 2019-12-03
Payer: COMMERCIAL

## 2019-12-03 ENCOUNTER — TELEPHONE (OUTPATIENT)
Dept: OTHER | Facility: OUTPATIENT CENTER | Age: 19
End: 2019-12-03

## 2019-12-03 ENCOUNTER — HOSPITAL ENCOUNTER (OUTPATIENT)
Dept: BEHAVIORAL HEALTH | Facility: CLINIC | Age: 19
End: 2019-12-03
Attending: PSYCHIATRY & NEUROLOGY
Payer: COMMERCIAL

## 2019-12-03 VITALS
OXYGEN SATURATION: 96 % | BODY MASS INDEX: 28.14 KG/M2 | SYSTOLIC BLOOD PRESSURE: 117 MMHG | DIASTOLIC BLOOD PRESSURE: 74 MMHG | HEART RATE: 85 BPM | HEIGHT: 72 IN | WEIGHT: 207.75 LBS | TEMPERATURE: 97.7 F

## 2019-12-03 DIAGNOSIS — Z23 NEED FOR INFLUENZA VACCINATION: Primary | ICD-10-CM

## 2019-12-03 DIAGNOSIS — L73.9 FOLLICULITIS: ICD-10-CM

## 2019-12-03 PROCEDURE — 90853 GROUP PSYCHOTHERAPY: CPT | Performed by: PSYCHOLOGIST

## 2019-12-03 PROCEDURE — G0177 OPPS/PHP; TRAIN & EDUC SERV: HCPCS

## 2019-12-03 ASSESSMENT — MIFFLIN-ST. JEOR: SCORE: 1991.1

## 2019-12-03 NOTE — GROUP NOTE
Process Group Note    PATIENT'S NAME: Remy Holloway  MRN:   [id number]  :   2000  ACCT. NUMBER: [id number]  DATE OF SERVICE: 19  START TIME: 11:00 AM  END TIME: 11:50 AM  FACILITATOR: Concepcion Carrillo PsyD  TOPIC:  Process Group    Diagnoses:  Psychiatrist Dr Reina Rose MD, [facility].   -Nicolette Cook @ [employer]   Therapist: Jaylin Samson In [place]  PCP:  Dr Lana Faustin @ [facility]     Diagnosis:    Autism Spectrum Disorder 299.00(F84.0)  Associated with another neurodevelopmental, mental or behavioral disorder, Attention-Deficit/Hyperactivity Disorder  314.01 (F90.9) Unspecified Attention -Deficit / Hyperactivity Disorder and Specific Learning Disorder 315.2 (F81.81) With impairment in written expression grammar and punctuation accuracy  296.43 Bipolar I Disorder Current or Most Recent Episode Manic, Severe   300.00 (F41.9) Unspecified Anxiety Disorder  302.85 (F64.1) Gender dysphoria in Adolescents and Adults  With a disorder of   sex development    Adult Mental Health Day Treatment  TRACK: 2A    NUMBER OF PARTICIPANTS: 7          Data:    Session content: At the start of this group, patients were invited to check in by identifying themselves, describing their current emotional status, and identifying issues to address in this group.   Area(s) of treatment focus addressed in this session included Symptom Management, Personal Safety and Community Resources/Discharge Planning.    Seema reported being safe today. Seema reported that they went running yesterday and plan to meet with [facility] workers during the week.  Different programs and [facility], CSP's, information were given to Seema to discuss with parents to decide if any other programs may be of interest to them upon discharge later in the month.    Therapeutic Interventions/Treatment Strategies:  Psychotherapist offered support, feedback and validation and reinforced use of skills. Treatment modalities used  include Cognitive Behavioral Therapy. Interventions include Cognitive Restructuring:  Assisted patient in formulating new neutral/positive alternatives to challenge less helpful / ineffective thoughts, Coping Skills: Discussed meditation skills and addressed ways to implement meditation skills  and Mindfulness: Encouraged a plan to use mindfulness skills in daily life.    Assessment:    Patient response:   Patient responded to session by accepting feedback, giving feedback, listening, focusing on goals, being attentive and accepting support    Possible barriers to participation / learning include: severity of symptoms    Health Issues:   None reported       Substance Use Review:   Substance Use: No active concerns identified.    Mental Status/Behavioral Observations  Appearance:   Appropriate   Eye Contact:   Good   Psychomotor Behavior: Normal   Attitude:   Cooperative   Orientation:   All  Speech   Rate / Production: Normal    Volume:  Normal   Mood:    Anxious  Normal  Affect:    Appropriate   Thought Content:   Rumination  Thought Form:  Coherent  Logical     Insight:    Good     Plan:     Safety Plan: Recommended that patient call 911 or go to the local ED should there be a change in any of these risk factors.     Barriers to treatment: None identified    Patient Contracts (see media tab):  None    Substance Use: Not addressed in session     Continue or Discharge: Patient will continue in Day Treatment (DT)  as planned. Patient is likely to benefit from learning and using skills as they work toward the goals identified in their treatment plan.    Stabilization of symptoms is needed.    Patti Alfredo, D,  Licensed Psychologist   December 3, 2019

## 2019-12-03 NOTE — NURSING NOTE
"19 year old  Chief Complaint   Patient presents with     Wound Check     left thigh open wound x 2 days reddiness with no drainage        Blood pressure 117/74, pulse 85, temperature 97.7  F (36.5  C), temperature source Oral, height 1.83 m (6' 0.05\"), weight 94.2 kg (207 lb 12 oz), SpO2 96 %. Body mass index is 28.14 kg/m .  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)     Gluten-sensitive enteropathy     Gluten intolerance     Seasonal allergic rhinitis     Mild intermittent asthma without complication     Gender dysphoria, unspecified     Bipolar 1 disorder (H)       Wt Readings from Last 2 Encounters:   12/03/19 94.2 kg (207 lb 12 oz)   11/19/19 95.3 kg (210 lb)     BP Readings from Last 3 Encounters:   12/03/19 117/74   11/19/19 122/57   08/05/19 102/69         Current Outpatient Medications   Medication     acetylcysteine (N-ACETYL CYSTEINE) 600 MG CAPS capsule     clonazePAM (KLONOPIN) 1 MG tablet     guanFACINE HCl (INTUNIV) 4 MG TB24     lithium (ESKALITH) 450 MG CR tablet     lithium (ESKALITH/LITHOBID) 300 MG CR tablet     loratadine (CLARITIN) 10 MG capsule     lurasidone (LATUDA) 80 MG TABS tablet     melatonin 3 MG tablet     metFORMIN (GLUCOPHAGE) 1000 MG tablet     QUEtiapine (SEROQUEL) 400 MG tablet     QUEtiapine (SEROQUEL) 50 MG tablet     sodium chloride (OCEAN) 0.65 % nasal spray     polyethylene glycol (MIRALAX/GLYCOLAX) powder     No current facility-administered medications for this visit.        Social History     Tobacco " Use     Smoking status: Never Smoker     Smokeless tobacco: Never Used   Substance Use Topics     Alcohol use: No     Drug use: No       Health Maintenance Due   Topic Date Due     ASTHMA CONTROL TEST  2000     MENTAL HEALTH TX PLAN  12/25/2019       No results found for: PAP      December 3, 2019 3:10 PM    Prior to immunization administration, verified patients identity using patient s name and date of birth. Please see Immunization Activity for additional information.     Screening Questionnaire for Adult Immunization    Are you sick today?   No   Do you have allergies to medications, food, a vaccine component or latex?   No   Have you ever had a serious reaction after receiving a vaccination?   No   Do you have a long-term health problem with heart disease, lung disease, asthma, kidney disease, metabolic disease (e.g. diabetes), anemia, or other blood disorder?   No   Do you have cancer, leukemia, HIV/AIDS, or any other immune system problem?   No   In the past 3 months, have you taken medications that affect  your immune system, such as prednisone, other steroids, or anticancer drugs; drugs for the treatment of rheumatoid arthritis, Crohn s disease, or psoriasis; or have you had radiation treatments?   No   Have you had a seizure, or a brain or other nervous system problem?   No   During the past year, have you received a transfusion of blood or blood     products, or been given immune (gamma) globulin or antiviral drug?   No   For women: Are you pregnant or is there a chance you could become        pregnant during the next month?   No   Have you received any vaccinations in the past 4 weeks?   No     Immunization questionnaire answers were all negative.        Per orders of Dr. Saucedo , injection of  Flu given by Palmira Gomez. Patient instructed to remain in clinic for 15 minutes afterwards, and to report any adverse reaction to me immediately.       Screening performed by Palmira Gomez on 12/3/2019  at 3:10 PM.

## 2019-12-03 NOTE — TELEPHONE ENCOUNTER
Rcvd call from Concepcion at [facility]. Pt is in need of a new psychiatrist because current provider only sees individuals up to age 20. Since Seema will soon be 20 a new provider is needed. Concepcion/family is inquiring about [facility]'s psychiatry services and wondering if Dr. Avendano would put in a referral.     Concepcion agreed calling pts guardian with the update would be appropriate.

## 2019-12-03 NOTE — GROUP NOTE
Life Skills/OT Group Note    PATIENT'S NAME: Remy Holloway  MRN:   [id number]  :   2000  ACCT. NUMBER: [id number]  DATE OF SERVICE: 19  START TIME:  9:00 AM  END TIME:  9:50 AM  FACILITATOR: Lopez Gray OTR/L  TOPIC:  Life Skills Group: Communication and Social Skills Development  Adult Mental Health Day Treatment  TRACK: 2A    NUMBER OF PARTICIPANTS: 7    Summary of Group / Topics Discussed:  Communication and Social Skills Development: Social Supports: Social Risk Taking: Patients explored and evaluated how effective they are in different aspects of social risk taking.  Patients gained awareness of different areas in which they need/want to take risks, possible benefits of taking this risk, and action steps to take.  Patients identified both personal strengths and opportunities for growth in social risk taking to improve overall communication and connection with other people.      Patient Session Goals / Objectives:    Identified strengths and opportunities for growth in social risk taking skills and how these impact their ability to communicate clearly with other people       Improved awareness of important aspects of social risk taking skills and how this relates to mental health recovery        Established a plan for practice of these skills in their own environments    Practiced and reflected on how to generalize taught skills to their everyday life        Patient Participation / Response:  Minimally participated, only when prompted / asked.    Patient presentation: Calm,alert with stable mood and thought process. Very poor focus and concentration and Patient would benefit from additional opportunities to practice the content to be able to generalize it to their everyday life with increased intentionality, consistency, and efficacy in support of their psychiatric recovery    Treatment Plan:  Patient has a current master individualized treatment plan.  See Epic treatment plan for more  information.    Lopez Gray, OTR/L

## 2019-12-03 NOTE — PROGRESS NOTES
SUBJECTIVE:   Remy Holloway is a 19 year old male who presents to clinic today to discuss the following problem(s).      Leg wound  - left leg at chafing point with testicle  - first noticed it Sunday  - painful with walking  - not bleeding or draining  - patient does not know if redness is swelling  - denies fever, chills, nausea      ROS:   CONSTITUTIONAL: NEGATIVE for chills, fatigue, fever,sweats   RESP: NEGATIVE for cough, hemoptysis, SOB/dyspnea and wheezing  CV: NEGATIVE for chest pain/chest pressure, dyspnea on exertion  GI: NEGATIVE for abdominal pain, diarrhea, dysphagia and nausea  : NEGATIVE for dysuria, frequency and hematuria  MUSCULOSKELETAL: NEGATIVE for arthralgias, cyanosis, or edema  INTEGUMENTARY/SKIN: painful lesion on medial, proximal thigh as noted above      Past Medical History:   Diagnosis Date     ADHD      Amblyopia, unspecified      Bipolar 1 disorder (H)      Esotropia, unspecified      Gender dysphoria      Lack of normal physiological development, unspecified     Normal chromosomes, nl MRI, neg fragile X     Learning disabilities      Redundant prepuce and phimosis     Penile coronal adhesions-repaired     Umbilical hernia without mention of obstruction or gangrene     repaired     Unspecified otitis media     Recurrent     Past Surgical History:   Procedure Laterality Date     DENTAL SURGERY      wisdom teeth removed     HC CREATE EARDRUM OPENING,GEN ANESTH  8/2000    P.E. Tubes - removed 2002     HC REPAIR, INCOMPLETE CIRCUMCISION  2000    Recircumcised     HERNIA REPAIR, UMBILICAL  2000    repair umbilical hernia     Family History   Problem Relation Age of Onset     Migraines Mother      Breast Cancer Natural parent         Mother     Depression Paternal Grandfather      Social History     Tobacco Use     Smoking status: Never Smoker     Smokeless tobacco: Never Used   Substance Use Topics     Alcohol use: No     Drug use: No     Social History     Social History Narrative     "Environmental History    Child is around people that smoke: No     Child's home has well water: No    Child's home has filtered water:No    Child has pets in the home: Yes    Child's home/apartment was built before 1950:Yes    Child attends : Yes    Child has exposure to sibling/playmate with lead poisoning: No    Child has exposure to someone with tuberculosis: No    Child home have guns/firearms without trigger locks: No    Child home have guns/firearms with trigger locks: No    There are concerns about the child's exposure to violence in the home: No        Family Information:    Parent #1    Name: Joe Jewel SARA, 12-2-63  Education:  Post grad Masters  Occupation:     Parent #2    Name: Daisha  Ana Lilia AAMIR, 1-16-64  Education: Post Grad  Occupation:         Relationship Status of Parent(s):     Who does the child live with? mother, father and brother(s)    What language(s) is/are spoken at home? English     Nuria Reyes RN                               Current Outpatient Medications   Medication     acetylcysteine (N-ACETYL CYSTEINE) 600 MG CAPS capsule     clonazePAM (KLONOPIN) 1 MG tablet     guanFACINE HCl (INTUNIV) 4 MG TB24     lithium (ESKALITH) 450 MG CR tablet     lithium (ESKALITH/LITHOBID) 300 MG CR tablet     loratadine (CLARITIN) 10 MG capsule     lurasidone (LATUDA) 80 MG TABS tablet     melatonin 3 MG tablet     metFORMIN (GLUCOPHAGE) 1000 MG tablet     QUEtiapine (SEROQUEL) 400 MG tablet     QUEtiapine (SEROQUEL) 50 MG tablet     sodium chloride (OCEAN) 0.65 % nasal spray     polyethylene glycol (MIRALAX/GLYCOLAX) powder     No current facility-administered medications for this visit.      I have reviewed the patient's past medical, surgical, family, and social history.     OBJECTIVE:   /74 (BP Location: Left arm, Patient Position: Sitting, Cuff Size: Adult Large)   Pulse 85   Temp 97.7  F (36.5  C) (Oral)   Ht 1.83 m (6' 0.05\")   Wt 94.2 kg (207 " lb 12 oz)   SpO2 96%   BMI 28.14 kg/m      Constitutional: well-appearing, appears stated age  Eyes: conjunctivae without erythema, sclera anicteric.   Cardiac: regular rate and rhythm, normal S1/S2, no murmur/rubs/gallops  Respiratory: lungs clear to auscultation bilaterally, normal work of breathing, no wheezes/crackles  Skin: approximate 2cm lesion with one open papule and one closed and minimal surrounding erythema. No bleeding, no drainage, no odor. Moderately tender to palpation  Neuro: non-specific developmental and/or speech delay  Psych: affect is delayed, speech is slow    ASSESSMENT AND PLAN:     Remy was seen today for wound check.    Diagnoses and all orders for this visit:    Need for influenza vaccination  -     C RIV4 (FLUBLOK) VACCINE RECOMBINANT DNA PRSRV ANTIBIO FREE, IM  -     ADMIN INFLUENZA VIRUS VACCINE    Folliculitis    Suspect mild infection at site of hair follicle, partially drained at this point. No clear benefit from I&D at second papule at this time. Discussed ongoing care for patient with her father, present in the room for the exam. Advised ongoing use of antibiotic ointment at site, covering lesion with bandaging to avoid further aggravation with friction/walking. Advised patient and father, if symptoms fail to improve or worsen over the next 5 days would recommend return visit for further assessment and possibly adding oral antibiotics as well as considering sending wound culture for culture/sensitivities.      Zack Saucedo MD  [facility]  12/03/2019, 3:19 PM

## 2019-12-04 ASSESSMENT — ASTHMA QUESTIONNAIRES: ACT_TOTALSCORE: 24

## 2019-12-05 ENCOUNTER — HOSPITAL ENCOUNTER (OUTPATIENT)
Dept: BEHAVIORAL HEALTH | Facility: CLINIC | Age: 19
End: 2019-12-05
Attending: PSYCHIATRY & NEUROLOGY
Payer: COMMERCIAL

## 2019-12-05 PROCEDURE — 90853 GROUP PSYCHOTHERAPY: CPT | Performed by: PSYCHOLOGIST

## 2019-12-05 PROCEDURE — G0177 OPPS/PHP; TRAIN & EDUC SERV: HCPCS

## 2019-12-05 NOTE — GROUP NOTE
Process Group Note    PATIENT'S NAME: Remy Holloway  MRN:   [id number]  :   2000  [id number]  DATE OF SERVICE: 19  START TIME:  9:00 AM  END TIME:  9:50 AM  FACILITATOR: Concepcion Carrillo PsyD  TOPIC:  Process Group    Diagnoses:    Psychiatrist Dr Reina Rose MD, [facility].   -Nicolette Cook @ [employer]   Therapist: Jaylin Samson In [place]  PCP:  Dr Lana Faustin @ [facility]     Diagnosis:    Autism Spectrum Disorder 299.00(F84.0)  Associated with another neurodevelopmental, mental or behavioral disorder, Attention-Deficit/Hyperactivity Disorder  314.01 (F90.9) Unspecified Attention -Deficit / Hyperactivity Disorder and Specific Learning Disorder 315.2 (F81.81) With impairment in written expression grammar and punctuation accuracy  296.43 Bipolar I Disorder Current or Most Recent Episode Manic, Severe   300.00 (F41.9) Unspecified Anxiety Disorder  302.85 (F64.1) Gender dysphoria in Adolescents and Adults  With a disorder of   sex development    Adult Mental Health Day Treatment  TRACK: 2A      NUMBER OF PARTICIPANTS: 7          Data:    Session content: At the start of this group, patients were invited to check in by identifying themselves, describing their current emotional status, and identifying issues to address in this group.   Area(s) of treatment focus addressed in this session included Symptom Management, Personal Safety and Community Resources/Discharge Planning.    Seema reported being safe today. Seema discussed the weekly schedule and that Shahana is supposed to be helping arrange a volunteer job.  Seema discussed mood issues with the group and said that they are doing better.     Therapeutic Interventions/Treatment Strategies:  Psychotherapist offered support, feedback and validation and reinforced use of skills. Treatment modalities used include Cognitive Behavioral Therapy. Interventions include Cognitive Restructuring:  Assisted patient  in formulating new neutral/positive alternatives to challenge less helpful / ineffective thoughts, Coping Skills: Discussed meditation skills and addressed ways to implement meditation skills  and Mindfulness: Encouraged a plan to use mindfulness skills in daily life.    Assessment:    Patient response:   Patient responded to session by accepting feedback, giving feedback, listening, focusing on goals, being attentive and accepting support    Possible barriers to participation / learning include: severity of symptoms    Health Issues:   None reported       Substance Use Review:   Substance Use: No active concerns identified.    Mental Status/Behavioral Observations  Appearance:   Appropriate   Eye Contact:   Good   Psychomotor Behavior: Restless   Attitude:   Cooperative   Orientation:   All  Speech   Rate / Production: Normal    Volume:  Normal   Mood:    Anxious  Normal  Affect:    Appropriate   Thought Content:   Rumination  Thought Form:  Coherent  Logical     Insight:    Good     Plan:     Safety Plan: Recommended that patient call 911 or go to the local ED should there be a change in any of these risk factors.     Barriers to treatment: None identified    Patient Contracts (see media tab):  None    Substance Use: Not addressed in session     Continue or Discharge: Patient will continue in Day Treatment (DT)  as planned. Patient is likely to benefit from learning and using skills as they work toward the goals identified in their treatment plan.  Stabilization of symptoms is needed.      Yoana Alfredo., D,  Licensed Psychologist   December 5, 2019

## 2019-12-05 NOTE — GROUP NOTE
Life Skills/OT Group Note    PATIENT'S NAME: Remy Holloway  MRN:   [id number]  :   2000  ACCT. NUMBER: [id number]  DATE OF SERVICE: 19  START TIME: 11:00 AM  END TIME: 11:50 AM  FACILITATOR: Lopez Gray OTR/L  TOPIC:  Life Skills Group: Lifestyle Balance and Structure  Adult Mental Health Day Treatment  TRACK: 6B    NUMBER OF PARTICIPANTS: 6    Summary of Group / Topics Discussed:  Lifestyle Balance and Strucure:  Benefits of Leisure on Mental Health(Creative Leisure Self-Assessment): Patients explored and learned about the benefits and possibilities of leisure activity to create lifestyle balance that supports their mental and physical wellbeing.  Patients were assisted to identify individualized leisure values and interests, recognized the benefits of leisure activity on mental health, and problem solved barriers to leisure engagement and strategies to overcome.  Patient engaged in an experiential leisure activity to gain self-awareness and build milieu social aspects and reflected on the impact the experiential activity had on their mood.       Patient Session Goals / Objectives:    Increased awareness of the importance of engagement in leisure activities to support lifestyle balance and perceived quality of life    Identified strategies to recognize and challenge barriers to leisure participation     Facilitated exploration of meaningful leisure interests and values    Practiced and reflected on how to generalize taught skills to their everyday life        Patient Participation / Response:  Moderately participated, sharing some personal reflections / insights and adequately adequately received / provided feedback with other participants.    Patient presentation: Calm,alert with stable mood and thought process. Reviewed dischage plan  with patient which will include [facility] services two times per week,[facility] services two times per week and [facility] two times each week.  Therapist suggested that he consider a volunteer job on the weekends and Patient would benefit from additional opportunities to practice the content to be able to generalize it to their everyday life with increased intentionality, consistency, and efficacy in support of their psychiatric recovery    Treatment Plan:  Patient has a current master individualized treatment plan.  See Epic treatment plan for more information.    Lopez Gray, OTR/L

## 2019-12-05 NOTE — GROUP NOTE
Life Skills/OT Group Note    PATIENT'S NAME: Remy Holloway  MRN:   [id number]  :   2000  ACCT. NUMBER: [id number]  DATE OF SERVICE: 19  START TIME: 10:00 AM  END TIME: 10:50 AM  FACILITATOR: Bryan Gerber OT  TOPIC:  Life Skills Group: Sensory Approaches in Mental Health  Adult Mental Health Day Treatment  TRACK: 2A    NUMBER OF PARTICIPANTS: 7    Summary of Group / Topics Discussed:  Sensory Approaches in Mental Health:  Sensory Enhanced Mindfulness: Patients were taught and provided with an opportunity to explore and practice how using sensory enhanced mindfulness practices can help them stay grounded in the present moment as a way to manage mental health symptoms and stressors.         Patient Session Goals / Objectives:    Identified how using sensory enhanced mindfulness practices can be used for grounding, stress management, and self regulation      Improved awareness of different types of sensory enhanced mindfulness activities that assist with healthy coping of stress and symptoms      Established a plan for practice of these skills in their own environments    Practiced and reflected on how to generalize taught skills to their everyday life        Patient Participation / Response:  Fully participated with the group by sharing personal reflections / insights and openly received / provided feedback with other participants.    Verbalized understanding of content and Patient would benefit from additional opportunities to practice the content to be able to generalize it to their everyday life with increased intentionality, consistency, and efficacy in support of their psychiatric recovery    Treatment Plan:  Patient has a current master individualized treatment plan.  See Epic treatment plan for more information.    Bryan Gerber OT

## 2019-12-05 NOTE — GROUP NOTE
Life Skills/OT Group Note    PATIENT'S NAME: Remy Holloway  MRN:   [id number]  :   2000  ACCT. NUMBER: [id number]  DATE OF SERVICE: 19  START TIME: 10:00 AM  END TIME: 10:50 AM  FACILITATOR: Bryan Gerber OT  TOPIC: [facility] OT Group: Self- Regulation Skills  Adult Mental Health Day Treatment  TRACK: 2A    NUMBER OF PARTICIPANTS: 7    Summary of Group / Topics Discussed:  Self-Regulation Skills: Sensory Enhanced Mindfulness: Proprioception Patients were provided with written and verbal psychoeducation on the concept of integrating mindfulness with a bottom up, sensory rich, experiential intervention activities to develop self-awareness and skills for self-regulation.  Emphasis placed on the benefits of proprioceptive input through experiential activities to emotionally ground oneself or provide a healthy distraction to self-regulate when distressed. Patients worked to increase knowledge and skills during a guided skilled sensory enhanced activity: Adapted Xi gong and Mathew Chi, an active meditation practice that has been shown to reduce sympathetic activation and develop and improve self regulation skills through practice. Facilitation of reflection to reinforce taught concepts was provided.     Patient Session Goals / Objectives:  Identified how using sensory (proprioception) enhanced mindfulness practices can be used for grounding, stress management, and self-regulation    Improved awareness of different types of sensory enhanced mindfulness activities that assist with healthy coping of stress and symptoms    Established a plan for practice of these skills in their own environments  Practiced and reflected on how to generalize taught skills to their everyday life        Patient Participation / Response:  {OP  PROGRESS NOTE PATIENT PARTICIPATION:988129}    {OP  PROGRESS NOTE PATIENT RESPONSE - LIFE SKILLS :346751}    Treatment Plan:  Patient has {OP  PROGRAMMATIC TX PLAN  STATUS:714374}    Bryan Gerber, OT

## 2019-12-09 ENCOUNTER — HOSPITAL ENCOUNTER (OUTPATIENT)
Dept: BEHAVIORAL HEALTH | Facility: CLINIC | Age: 19
End: 2019-12-09
Attending: PSYCHIATRY & NEUROLOGY
Payer: COMMERCIAL

## 2019-12-09 PROCEDURE — 90853 GROUP PSYCHOTHERAPY: CPT | Performed by: PSYCHOLOGIST

## 2019-12-09 PROCEDURE — G0177 OPPS/PHP; TRAIN & EDUC SERV: HCPCS

## 2019-12-09 NOTE — GROUP NOTE
EBP Group Note    PATIENT'S NAME: Remy Holloway  MRN:   [id number]  [facility]   2000  [id number]  DATE OF SERVICE: 19  START TIME:  10:00 AM  END TIME:  10:50 AM  FACILITATOR: Concepcion Carrillo PsyD  TOPIC:  EBP Group: Emotions Management  Adult Mental Health Day Treatment  TRACK: 2A    NUMBER OF PARTICIPANTS: 4    Summary of Group / Topics Discussed:  Emotions Management: Guilt and Shame: Patients explored and shared personal experiences associated with feelings of guilt and shame.  Group explored how these feelings develop, what they mean to each individual, and how to increase acceptance and usefulness of these feelings.  Group members assisted one another to contextualize these concepts and promote healing.     Patient Session Goals / Objectives:    Discuss and review definitions and personal views/experiences with guilt and shame    Understand the differences between guilt and shame    Explore how feelings of guilt and shame impact functioning    Understand and practice strategies to manage difficult emotions and move towards healing    Understand and normalize difficult emotions through group discussion    Understand the utility of guilt and shame    Target  unwanted  emotions for change      Patient Participation / Response:  fully participated, sharing some personal reflections / insights and adequately adequately received / provided feedback with other participants.    Expressed understanding of the relevance / importance of emotions management skills at distressing times in life and Demonstrated knowledge of when to consider applying a variety of emotions management skills in daily life    Treatment Plan:  Patient has a current master individualized treatment plan.  See Epic treatment plan for more information.    Rocky Urbano Psy., D,  Licensed Psychologist

## 2019-12-09 NOTE — GROUP NOTE
Process Group Note    PATIENT'S NAME: Remy Holloway  MRN:   [id number]  :   2000  ACCT. NUMBER: [id number]  DATE OF SERVICE: 19  START TIME: 11:00 AM  END TIME: 11:50 AM  FACILITATOR: Concepcion Carrillo PsyD  TOPIC:  Process Group    Diagnoses:  Psychiatrist Dr Reina Rose MD, [facility].   -Nicolette Cook @ [employer]   Therapist: Jaylin Samson In [place]  PCP:  Dr Lana Faustin @ [facility]     Diagnosis:    Autism Spectrum Disorder 299.00(F84.0)  Associated with another neurodevelopmental, mental or behavioral disorder, Attention-Deficit/Hyperactivity Disorder  314.01 (F90.9) Unspecified Attention -Deficit / Hyperactivity Disorder and Specific Learning Disorder 315.2 (F81.81) With impairment in written expression grammar and punctuation accuracy  296.43 Bipolar I Disorder Current or Most Recent Episode Manic, Severe   300.00 (F41.9) Unspecified Anxiety Disorder  302.85 (F64.1) Gender dysphoria in Adolescents and Adults  With     Adult Mental Health Day Treatment  TRACK: 2A    NUMBER OF PARTICIPANTS: 4          Data:    Session content: At the start of this group, patients were invited to check in by identifying themselves, describing their current emotional status, and identifying issues to address in this group.   Area(s) of treatment focus addressed in this session included Symptom Management, Personal Safety and Community Resources/Discharge Planning.    Seema reported being safe with no thoughts to harm themselves or others.  Seema reported that they talked to family and some mental health workers, attended the Saturday group therapy, watched the [place] on TV, texted Freddy hull, and went running.  Seema talked to the group about learning and practicing skills and how it can take time to do this.  Seema talked to others about how to manage difficult people at family events.     Therapeutic Interventions/Treatment Strategies:  Psychotherapist offered support,  feedback and validation and reinforced use of skills. Treatment modalities used include Cognitive Behavioral Therapy. Interventions include Cognitive Restructuring:  Explored impact of ineffective thoughts / distortions on mood and activity, Coping Skills: Assisted patient in identifying 1-2 healthy distraction skills to reduce overall distress and Assisted patient in understanding the purpose of planning / creating / participating / sharing in positive experiences and Mindfulness: Encouraged a plan to use mindfulness skills in daily life.    Assessment:    Patient response:   Patient responded to session by accepting feedback, giving feedback, listening, focusing on goals, being attentive and accepting support    Possible barriers to participation / learning include: severity of symptoms    Health Issues:   None reported       Substance Use Review:   Substance Use: No active concerns identified.    Mental Status/Behavioral Observations  Appearance:   Appropriate   Eye Contact:   Good   Psychomotor Behavior: Normal   Attitude:   Cooperative   Orientation:   All  Speech   Rate / Production: Normal    Volume:  Normal   Mood:    Anxious  Depressed   Affect:    Appropriate   Thought Content:   Rumination  Thought Form:  Coherent  Logical     Insight:    Good     Plan:     Safety Plan: Recommended that patient call 911 or go to the local ED should there be a change in any of these risk factors.     Barriers to treatment: None identified    Patient Contracts (see media tab):  None    Substance Use: Not addressed in session     Continue or Discharge: Patient will continue in Day Treatment (DT)  as planned. Patient is likely to benefit from learning and using skills as they work toward the goals identified in their treatment plan.      Yoana Alfredo., D,  Licensed Psychologist   December 9, 2019

## 2019-12-09 NOTE — GROUP NOTE
RN Group Note    PATIENT'S NAME: Remy Holloway  MRN:   [id number]  :   2000  ACCT. NUMBER: [id number]  DATE OF SERVICE: 19  START TIME:  9:00 AM  END TIME:  9:50 AM  FACILITATOR: Kezia Cleaning RN  TOPIC:  RN Group: [facility]  Adult Mental Health Day Treatment  TRACK: 2A    NUMBER OF PARTICIPANTS: 3    Summary of Group / Topics Discussed:  Foundations of Health: Nutrition: Macronutrients: Patient were provided education on major macronutrients, their role in the body, and why it is important to meet daily nutritional needs. Obstacles to meeting daily nutritional needs were identified in group discussion and strategies to promote improved nutrition were explored. Macronutrients discussed include: carbohydrates, proteins and amino acids, fats, fiber, and water.     Patient Session Goals / Objectives:  ? Discussed the role of diet on mood, physical health, energy level, and the development of chronic disease.  ? Identified daily nutritional needs recommended by the USDA via My Plate education resources  ? Developing increased health literacy skills in finding credible nutrition information from reliable sources    Patient Participation / Response:  Fully participated with the group by sharing personal reflections / insights and openly received / provided feedback with other participants.    Demonstrated understanding of topics discussed through group discussion and participation, Identified / Expressed personal readiness to practice skills and Verbalized understanding of foundations of health topic    Treatment Plan:  Patient has a current master individualized treatment plan.  See Epic treatment plan for more information.    Kezia Cleaning RN

## 2019-12-10 ENCOUNTER — HOSPITAL ENCOUNTER (OUTPATIENT)
Dept: BEHAVIORAL HEALTH | Facility: CLINIC | Age: 19
End: 2019-12-10
Attending: PSYCHIATRY & NEUROLOGY
Payer: COMMERCIAL

## 2019-12-10 PROCEDURE — 90853 GROUP PSYCHOTHERAPY: CPT | Performed by: PSYCHOLOGIST

## 2019-12-10 PROCEDURE — G0177 OPPS/PHP; TRAIN & EDUC SERV: HCPCS | Performed by: COUNSELOR

## 2019-12-10 PROCEDURE — G0177 OPPS/PHP; TRAIN & EDUC SERV: HCPCS

## 2019-12-10 NOTE — GROUP NOTE
RN Group Note    PATIENT'S NAME: Remy Holloway  MRN:   [id number]  :   2000  ACCT. NUMBER: [id number]  DATE OF SERVICE: 12/10/19  START TIME: 11:00 AM  END TIME: 11:50 AM  FACILITATOR: Rebeca Melendez LPCC  TOPIC:  RN Group: Health Maintenance  Adult Mental Health Day Treatment  TRACK: 2A    NUMBER OF PARTICIPANTS: 5    Summary of Group / Topics Discussed:  Health Maintenance: Goal Setting: Meaningful goals can bring a sense of direction and purpose in life.  They also highlight our most important values. Patients were assisted by instructor to identify short term goals to promote their mental health recovery and improve overall health and wellness. Patients were educated on SMART goal setting framework as a strategy to increase outcomes and promote success.    Patient Session Goals / Objectives:  ? Explained the key concepts of SMART goal setting framework  ? Identified three goals successfully using SMART goal setting framework  ? Reviewed concept of balance in wellness as it pertains to goal setting        Patient Participation / Response:  Fully participated with the group by sharing personal reflections / insights and openly received / provided feedback with other participants.    Demonstrated understanding of topics discussed through group discussion and participation    Treatment Plan:  Patient has a current master individualized treatment plan.  See Epic treatment plan for more information.    KAILASH Nelson

## 2019-12-10 NOTE — GROUP NOTE
Life Skills/OT Group Note    PATIENT'S NAME: Remy Holloway  MRN:   [id number]  :   2000  ACCT. NUMBER: [id number]  DATE OF SERVICE: 12/10/19  START TIME:  9:00 AM  END TIME:  9:50 AM  FACILITATOR: Lopez Gray OTR/L  TOPIC:  Life Skills Group: Lifestyle Balance and Structure  Adult Mental Health Day Treatment  TRACK: 2A    NUMBER OF PARTICIPANTS: 6    Summary of Group / Topics Discussed:  Lifestyle Balance and Strucure:  Occupations:: Patients were introduced to the importance of daily occupations in support of mental health management by exploring a balanced lifestyle.  Patients identified how they spend their time and skills to bring this into better balance.  Patients were assisted to establish, restore, and/or modify performance skills and patterns for improved engagement and promotion of positive mental health through meaningful occupations.  Patients gained awareness of the connection between who they are (self identity) with what they do.    Patient Session Goals / Objectives:    Increased awareness of how patient s functioning in identified meaningful occupations are impacted by their mental health status     Developed performance skills and performance patterns to enhance occupational engagement through creating a balanced lifestyle    Explored ways to generalize new awareness and skills to their everyday life        Patient Participation / Response:  Moderately participated, sharing some personal reflections / insights and adequately adequately received / provided feedback with other participants.    Patient presentation: Calm,alert,focused with stable mood and thought process and Patient would benefit from additional opportunities to practice the content to be able to generalize it to their everyday life with increased intentionality, consistency, and efficacy in support of their psychiatric recovery    Treatment Plan:  Patient has a current master individualized  treatment plan.  See Epic treatment plan for more information.    Lopez Gray, OTR/L

## 2019-12-10 NOTE — GROUP NOTE
Process Group Note    PATIENT'S NAME: Remy Holloway  MRN:   [id number]  :   2000  ACCT. NUMBER: [id number]  DATE OF SERVICE: 12/10/19  START TIME: 10:00 AM  END TIME: 10:50 AM  FACILITATOR: Concepcion Carrillo PsyD  TOPIC:  Process Group    Diagnoses:  Psychiatrist Dr Reina Rose MD, [facility].   -Nicolette Cook @ [employer]   Therapist: Jaylin Samson In [place]  PCP:  Dr Lana Faustin @ [facility]     Diagnosis:    Autism Spectrum Disorder 299.00(F84.0)  Associated with another neurodevelopmental, mental or behavioral disorder, Attention-Deficit/Hyperactivity Disorder  314.01 (F90.9) Unspecified Attention -Deficit / Hyperactivity Disorder and Specific Learning Disorder 315.2 (F81.81) With impairment in written expression grammar and punctuation accuracy  296.43 Bipolar I Disorder Current or Most Recent Episode Manic, Severe   300.00 (F41.9) Unspecified Anxiety Disorder  302.85 (F64.1) Gender dysphoria in Adolescents and Adults  With a disorder of   sex development    Adult Mental Health Day Treatment  TRACK: 2A    NUMBER OF PARTICIPANTS: 6          Data:    Session content: At the start of this group, patients were invited to check in by identifying themselves, describing their current emotional status, and identifying issues to address in this group.   Area(s) of treatment focus addressed in this session included Symptom Management, Personal Safety and Community Resources/Discharge Planning.    Seema reported being safe today with no urges to harm self or others.  Seema reported a big meeting on Thursday with many health care professionals and worried about the meeting. Seema reported that they will see a PCA later, and the  will be there too.  Seema talked about how one PCA was very helpful over years and has become more of a family friend.     Therapeutic Interventions/Treatment Strategies:  Psychotherapist offered support, feedback and validation and reinforced  use of skills. Treatment modalities used include Cognitive Behavioral Therapy and Dialectical Behavioral Therapy. Interventions include Cognitive Restructuring:  Explored impact of ineffective thoughts / distortions on mood and activity, Coping Skills: Assisted patient in identifying 1-2 healthy distraction skills to reduce overall distress, Mindfulness: Encouraged a plan to use mindfulness skills in daily life and Emotions Management:  Reviewed opposite action skill.    Assessment:    Patient response:   Patient responded to session by accepting feedback, giving feedback, listening, focusing on goals, being attentive and accepting support    Possible barriers to participation / learning include: severity of symptoms    Health Issues:   None reported       Substance Use Review:   Substance Use: No active concerns identified.    Mental Status/Behavioral Observations  Appearance:   Appropriate   Eye Contact:   Good   Psychomotor Behavior: Restless   Attitude:   Cooperative   Orientation:   All  Speech   Rate / Production: Normal    Volume:  Normal   Mood:    Anxious   Affect:    Appropriate   Thought Content:   Rumination  Thought Form:  Coherent  Logical  Tangential     Insight:    Fair     Plan:     Safety Plan: Recommended that patient call 911 or go to the local ED should there be a change in any of these risk factors.     Barriers to treatment: None identified    Patient Contracts (see media tab):  None    Substance Use: Not addressed in session     Continue or Discharge: Patient will continue in Day Treatment (DT)  as planned. Patient is likely to benefit from learning and using skills as they work toward the goals identified in their treatment plan.     ]    Stabilization of symptoms is needed.          Yoana Alfredo., D,  Licensed Psychologist   December 10, 2019

## 2019-12-12 ENCOUNTER — HOSPITAL ENCOUNTER (OUTPATIENT)
Dept: BEHAVIORAL HEALTH | Facility: CLINIC | Age: 19
End: 2019-12-12
Attending: PSYCHIATRY & NEUROLOGY
Payer: COMMERCIAL

## 2019-12-12 PROCEDURE — G0177 OPPS/PHP; TRAIN & EDUC SERV: HCPCS | Performed by: OCCUPATIONAL THERAPIST

## 2019-12-12 PROCEDURE — 90853 GROUP PSYCHOTHERAPY: CPT | Performed by: PSYCHOLOGIST

## 2019-12-12 NOTE — GROUP NOTE
Process Group Note    PATIENT'S NAME: Remy Holloway  MRN:   [id number]  :   2000  ACCT. NUMBER: [id number]  DATE OF SERVICE: 19  START TIME:  9:00 AM  END TIME:  9:50 AM  FACILITATOR: Concepcion Carrillo PsyD  TOPIC:  Process Group    Diagnoses:  Psychiatrist Dr Reina Rose MD, [facility]. [phone], fax: [phone]  -Nicolette Cook @ [employer] [phone], x [phone] , fax: [phone]     Therapist: Jaylin Samson In [place]  PCP:  Dr Lana Faustin @ [facility]    Diagnosis:    Autism Spectrum Disorder 299.00(F84.0)  Associated with another neurodevelopmental, mental or behavioral disorder, Attention-Deficit/Hyperactivity Disorder  314.01 (F90.9) Unspecified Attention -Deficit / Hyperactivity Disorder and Specific Learning Disorder 315.2 (F81.81) With impairment in written expression grammar and punctuation accuracy  296.43 Bipolar I Disorder Current or Most Recent Episode Manic, Severe   300.00 (F41.9) Unspecified Anxiety Disorder  302.85 (F64.1) Gender dysphoria in Adolescents and Adults    Adult Mental Health Day Treatment  TRACK: 2A    NUMBER OF PARTICIPANTS: 5          Data:    Session content: At the start of this group, patients were invited to check in by identifying themselves, describing their current emotional status, and identifying issues to address in this group.   Area(s) of treatment focus addressed in this session included Symptom Management, Personal Safety and Community Resources/Discharge Planning.    Seema reported being safe today. Seema described a busy day with a meeting later, and talking with the [employer] worker, and going to the [employer].  Seema reported feeling anxious about the meeting, and talked by phone to mom about different appointments.      Therapeutic Interventions/Treatment Strategies:  Psychotherapist offered support, feedback and validation and reinforced use of skills. Treatment modalities used include Cognitive Behavioral  Therapy. Interventions include Cognitive Restructuring:  Assisted patient in formulating new neutral/positive alternatives to challenge less helpful / ineffective thoughts, Coping Skills: Assisted patient in identifying 1-2 healthy distraction skills to reduce overall distress and Discussed meditation skills and addressed ways to implement meditation skills , Mindfulness: Encouraged a plan to use mindfulness skills in daily life and Emotions Management:  Reviewed opposite action skill.    Assessment:    Patient response:   Patient responded to session by accepting feedback, giving feedback, listening, focusing on goals, being attentive and accepting support    Possible barriers to participation / learning include: severity of symptoms    Health Issues:   None reported       Substance Use Review:   Substance Use: No active concerns identified.    Mental Status/Behavioral Observations  Appearance:   Appropriate   Eye Contact:   Good   Psychomotor Behavior: Normal   Attitude:   Cooperative   Orientation:   All  Speech   Rate / Production: Normal    Volume:  Normal   Mood:    Anxious   Affect:    Appropriate   Thought Content:   Rumination  Thought Form:  Coherent  Logical  Tangential     Insight:    Good     Plan:     Safety Plan: Recommended that patient call 911 or go to the local ED should there be a change in any of these risk factors.     Barriers to treatment: None identified    Patient Contracts (see media tab):  None    Substance Use: Not addressed in session     Continue or Discharge: Patient will continue in Day Treatment (DT)  as planned. Patient is likely to benefit from learning and using skills as they work toward the goals identified in their treatment plan.     Stabilization of symptoms is needed.    Yoana Alfredo., D,  Licensed Psychologist        Concepcion Carrillo PsyD  December 12, 2019

## 2019-12-13 NOTE — GROUP NOTE
Life Skills/OT Group Note    PATIENT'S NAME: Remy Holloway  MRN:   [id number]  :   2000  ACCT. NUMBER: [id number]  DATE OF SERVICE: 19  START TIME: 11:00 AM  END TIME: 11:50 AM  FACILITATOR: Ruby Alonzo OTR/L  TOPIC:  Life Skills Group: Lifestyle Balance and Structure  Adult Mental Health Day Treatment  TRACK: 2A    NUMBER OF PARTICIPANTS: 5    Summary of Group / Topics Discussed:  Lifestyle Balance and Strucure:  Occupations: Patients were provided with an overview on the importance of daily occupations in support of mental health management.  Patients were assisted to establish, restore, and/or modify performance skills and patterns for improved engagement and promotion of positive mental health through meaningful occupations.  Patients gained awareness of the connection between who they are (self identity) with what they do.    Patient Session Goals / Objectives:    Increased awareness of how patient s functioning in identified meaningful occupations are impacted by their mental health status     Developed performance skills and performance patterns to enhance occupational engagement    Explored ways to generalize new awareness and skills to their everyday life        Patient Participation / Response:  Moderately participated, sharing some personal reflections / insights and adequately adequately received / provided feedback with other participants.    Patient presentation: constricted affect; seemed distracted/preoccupied in group but able to stay in group the entire time, Demonstrated understanding of content through identifying a couple of meaningful occupations to them such as running and watching the [employer] and Patient would benefit from additional opportunities to practice the content to be able to generalize it to their everyday life with increased intentionality, consistency, and efficacy in support of their psychiatric recovery    Treatment Plan:  Patient has a current master  individualized treatment plan.  See Epic treatment plan for more information.    Ruby Alonzo, OTR/L

## 2019-12-16 ENCOUNTER — HOSPITAL ENCOUNTER (OUTPATIENT)
Dept: BEHAVIORAL HEALTH | Facility: CLINIC | Age: 19
End: 2019-12-16
Attending: PSYCHIATRY & NEUROLOGY
Payer: COMMERCIAL

## 2019-12-16 PROCEDURE — G0177 OPPS/PHP; TRAIN & EDUC SERV: HCPCS

## 2019-12-16 PROCEDURE — 90853 GROUP PSYCHOTHERAPY: CPT | Performed by: PSYCHOLOGIST

## 2019-12-16 PROCEDURE — 90837 PSYTX W PT 60 MINUTES: CPT | Performed by: PSYCHOLOGIST

## 2019-12-16 NOTE — GROUP NOTE
RN Group Note    PATIENT'S NAME: Remy Holloway  MRN:   [id number]  :   2000  ACCT. NUMBER: [id number]   DATE OF SERVICE: 19  START TIME:  9:00 AM  END TIME:  9:50 AM  FACILITATOR: Kezia Cleaning RN  TOPIC:  RN Group: [facility]  Adult Mental Health Day Treatment  TRACK: 2A    NUMBER OF PARTICIPANTS: 6    Summary of Group / Topics Discussed:  Foundations of Health: Sexual health/risk reduction:   Patients were educated on safe sexual health practices including: safe use of birth control/contraceptive methods, barrier protection, and use of healthy boundaries. Major sexually transmitted infections and their signs, symptoms, transmission routes, and treatment options were discussed and prevention strategies were reviewed. Current recommendations for STI screening was discussed.     Patient Session Goals / Objectives:  ? Verbalized knowledge of safe sexual practices   ? Described sexually transmitted infections types, signs & symptoms, transmission routes and prevention strategies and behaviors  ? Identify current recommendations for STI screening    Patient Participation / Response:  Fully participated with the group by sharing personal reflections / insights and openly received / provided feedback with other participants.    Demonstrated understanding of topics discussed through group discussion and participation, Identified / Expressed personal readiness to practice skills and Verbalized understanding of foundations of health topic    Treatment Plan:  Patient has a current master individualized treatment plan.  See Epic treatment plan for more information.    Kezia Cleaning RN

## 2019-12-16 NOTE — GROUP NOTE
Process Group Note    PATIENT'S NAME: Remy Holloway  MRN:   [id number]  :   2000  ACCT. NUMBER: [id number]  DATE OF SERVICE: 19  START TIME: 10:00 AM  END TIME: 10:50 AM  FACILITATOR: Concepcion Carrillo PsyD  TOPIC:  Process Group    Diagnoses:  Psychiatrist Dr Reina Rose MD, [facility].   -Nicolette Cook @ [employer]   Therapist: Jaylin Samson In [place]  PCP:  Dr Lana Faustin @ [facility]     Diagnosis:    Autism Spectrum Disorder 299.00(F84.0)  Associated with another neurodevelopmental, mental or behavioral disorder, Attention-Deficit/Hyperactivity Disorder  314.01 (F90.9) Unspecified Attention -Deficit / Hyperactivity Disorder and Specific Learning Disorder 315.2 (F81.81) With impairment in written expression grammar and punctuation accuracy  296.43 Bipolar I Disorder Current or Most Recent Episode Manic, Severe   300.00 (F41.9) Unspecified Anxiety Disorder  302.85 (F64.1) Gender dysphoria in Adolescents and Adults  With a disorder of   sex development     Adult Mental Health Day Treatment  TRACK: 2A     NUMBER OF PARTICIPANTS: 6          Adult Mental Health Day Treatment  TRACK: 2A    NUMBER OF PARTICIPANTS: 6          Data:    Session content: At the start of this group, patients were invited to check in by identifying themselves, describing their current emotional status, and identifying issues to address in this group.   Area(s) of treatment focus addressed in this session included Symptom Management, Personal Safety and Community Resources/Discharge Planning.    Seema reported being safe today. Seema reported that there was a big meeting with many providers and goals for treatment were set.  Seema reported feeling overwhelmed, so  Nicolette helped describe feelings and issues with Seema and this support helped to feel supported  Seema reported that they talked about problems with the relationship of the brother. Seema also identified strengths and  shared them with the group.    Therapeutic Interventions/Treatment Strategies:  Psychotherapist offered support, feedback and validation and reinforced use of skills. Treatment modalities used include Cognitive Behavioral Therapy. Interventions include Cognitive Restructuring:  Explored impact of ineffective thoughts / distortions on mood and activity, Coping Skills: Promoted understanding of how and when to apply grounding strategies to reduce distress and increase presence in the moment and Mindfulness: Encouraged a plan to use mindfulness skills in daily life.    Assessment:    Patient response:   Patient responded to session by accepting feedback, giving feedback, listening, focusing on goals, being attentive and accepting support    Possible barriers to participation / learning include: severity of symptoms    Health Issues:   None reported       Substance Use Review:   Substance Use: No active concerns identified.    Mental Status/Behavioral Observations  Appearance:   Appropriate   Eye Contact:   Good   Psychomotor Behavior: Normal  and at times restless  Attitude:   Cooperative   Orientation:   All  Speech   Rate / Production: Normal    Volume:  Normal   Mood:    Anxious  Normal  Affect:    Appropriate   Thought Content:   Rumination  Thought Form:  Coherent  Logical  Tangential     Insight:    Good     Plan:     Safety Plan: Recommended that patient call 911 or go to the local ED should there be a change in any of these risk factors.     Barriers to treatment: None identified    Patient Contracts (see media tab):  None    Substance Use: Not addressed in session     Continue or Discharge: Patient will continue in Day Treatment (DT)  as planned. Patient is likely to benefit from learning and using skills as they work toward the goals identified in their treatment plan.     Stabilization of symptoms is needed.    Yoana Alfredo., D,  Licensed Psychologist        Concepcion Carrillo PsyD  December 16, 2019

## 2019-12-16 NOTE — GROUP NOTE
EBP Group Note    PATIENT'S NAME: Remy Holloway  MRN:   [id number]  :   2000  [id number]  DATE OF SERVICE: 19  START TIME: 11:00 AM  END TIME: 11:50 AM  FACILITATOR: Concepcion Carrillo PsyD  TOPIC:  EBP Group: Coping Skills  Adult Mental Health Day Treatment  TRACK: 2A    NUMBER OF PARTICIPANTS: 6    Summary of Group / Topics Discussed:  Coping Skills: Building Positive Experiences: Patients discussed the importance of planning and engaging in positive experiences, as strategies to increase positive thinking, hope, and self-worth.  Explored the benefits of planning / creating positive experiences, including recognizing and reducing negativity bias by focusing on and building positive experiences.   Several approaches to building positive experiences were presented and discussed relevant to each patient.      Patient Session Goals / Objectives:    Understand the purpose of planning / creating / participating / sharing in positive experiences.    Explore patient s experiences related to negative thinking and how it influences activities and moodIdentify current positive events in patient s life.     Set goals to increase a variety of positive experiences.    Address barriers to planning / engaging in positive experiences.        Patient Participation / Response:  Fully participated with the group by sharing personal reflections / insights and openly received / provided feedback with other participants.    Demonstrated understanding of topics discussed through group discussion and participation and Expressed understanding of the relevance / importance of coping skills at distressing times in life    Treatment Plan:  Patient has a current master individualized treatment plan.  See Epic treatment plan for more information.   Patti Alfredo, D,  Licensed Psychologist      Concepcion Carrillo PsyD

## 2019-12-17 ENCOUNTER — HOSPITAL ENCOUNTER (OUTPATIENT)
Dept: BEHAVIORAL HEALTH | Facility: CLINIC | Age: 19
End: 2019-12-17
Attending: PSYCHIATRY & NEUROLOGY
Payer: COMMERCIAL

## 2019-12-17 PROCEDURE — 90853 GROUP PSYCHOTHERAPY: CPT | Performed by: PSYCHOLOGIST

## 2019-12-17 PROCEDURE — 90853 GROUP PSYCHOTHERAPY: CPT

## 2019-12-17 PROCEDURE — G0177 OPPS/PHP; TRAIN & EDUC SERV: HCPCS

## 2019-12-17 NOTE — GROUP NOTE
Process Group Note    PATIENT'S NAME: Remy Holloway  MRN:   [id number]  :   2000  ACCT. NUMBER: [id number]  DATE OF SERVICE: 19  START TIME:  9:00 AM  END TIME:  9:50 AM  FACILITATOR: Concepcion Carrillo PsyD  TOPIC:  Process Group    Diagnoses:    Psychiatrist Dr Reina [facility], [facility]. [phone], fax: [phone]  -Nicolette Cook @ [employer] [phone], x [phone] , fax: [phone]     Therapist: Jaylin Samson In [place]  PCP:  Dr Lana Faustin @ [facility]     Diagnosis:    Autism Spectrum Disorder 299.00(F84.0)  Associated with another neurodevelopmental, mental or behavioral disorder, Attention-Deficit/Hyperactivity Disorder  314.01 (F90.9) Unspecified Attention -Deficit / Hyperactivity Disorder and Specific Learning Disorder 315.2 (F81.81) With impairment in written expression grammar and punctuation accuracy  296.43 Bipolar I Disorder Current or Most Recent Episode Manic, Severe   300.00 (F41.9) Unspecified Anxiety Disorder  302.85 (F64.1) Gender dysphoria in Adolescents and Adults    Adult Mental Health Day Treatment  TRACK: 2A    NUMBER OF PARTICIPANTS: 1 merged with 3 from another group          Data:    Session content: At the start of this group, patients were invited to check in by identifying themselves, describing their current emotional status, and identifying issues to address in this group.   Area(s) of treatment focus addressed in this session included Symptom Management, Personal Safety and Community Resources/Discharge Planning.    Seema reported being safe today with no thoughts or intent to harm self or others. Seema reported upcoming appointments with providers, and planned to keep appointments. Seema reported stable sleep, appetite, and mood.  Seema reported a vacation to [place] over the holidays and plans to coordinate time to attend the [employer] in [place] for the [facility].      Therapeutic Interventions/Treatment  Strategies:  Psychotherapist offered support, feedback and validation, provided redirection and reinforced use of skills. Treatment modalities used include Cognitive Behavioral Therapy. Interventions include Cognitive Restructuring:  Explored impact of ineffective thoughts / distortions on mood and activity, Coping Skills: Assisted patient in identifying 1-2 healthy distraction skills to reduce overall distress and Relationship Skills: Discussed relationships and ways to reduce conflict .    Assessment:    Patient response:   Patient responded to session by accepting feedback, giving feedback, listening, focusing on goals, being attentive and accepting support    Possible barriers to participation / learning include: severity of symptoms    Health Issues:   None reported       Substance Use Review:   Substance Use: No active concerns identified.    Mental Status/Behavioral Observations  Appearance:   Appropriate   Eye Contact:   Good   Psychomotor Behavior: Normal   Attitude:   Cooperative   Orientation:   All  Speech   Rate / Production: Slow    Volume:  Soft   Mood:    Sad   Affect:    Constricted   Thought Content:   Rumination  Thought Form:  Coherent  Logical     Insight:    Good     Plan:     Safety Plan: Recommended that patient call 911 or go to the local ED should there be a change in any of these risk factors.     Barriers to treatment: None identified    Patient Contracts (see media tab):  None    Substance Use: Not addressed in session     Continue or Discharge: Patient will continue in Day Treatment (DT)  as planned. Patient is likely to benefit from learning and using skills as they work toward the goals identified in their treatment plan.     Patti Alfredo, D,  Licensed Psychologist       December 17, 2019

## 2019-12-17 NOTE — PROGRESS NOTES
Adult Mental Health Day Treatment  TRACK: 2A, merged with 6A    PATIENT'S NAME: Remy Holloway  MRN:   [id number]  :   2000  ACCT. NUMBER: [id number]  DATE OF SERVICE: 19  START TIME: 1100  END TIME: 1150    NUMBER OF PARTICIPANTS: 4      Summary of Group / Topics Discussed:  Health Maintenance: Goal Setting: Meaningful goals can bring a sense of direction and purpose in life.  They also highlight our most important values. Patients were assisted by instructor to identify short term goals to promote their mental health recovery and improve overall health and wellness. Patients were educated on SMART goal setting framework as a strategy to increase outcomes and promote success.    Patient Session Goals / Objectives:  ? Explained the key concepts of SMART goal setting framework  ? Identified three goals successfully using SMART goal setting framework  ? Reviewed concept of balance in wellness as it pertains to goal setting    Patient Participation / Response:  Fully participated with the group by sharing personal reflections / insights and openly received / provided feedback with other participants.    Verbalized understanding of health maintenance topic    Treatment Plan:  Patient has a current master individualized treatment plan.  See Epic treatment plan for more information.

## 2019-12-17 NOTE — GROUP NOTE
EBP Group Note    PATIENT'S NAME: Remy Holloway  MRN:   [id number]  :   2000  ACCT. NUMBER: [id number]  DATE OF SERVICE: 19  START TIME: 10:00 AM  END TIME: 10:50 AM  FACILITATOR: Alma Ramires  TOPIC:  EBP Group: Emotions Management  Adult Mental Health Day Treatment  TRACK: 2A and 6A    NUMBER OF PARTICIPANTS: 4    Summary of Group / Topics Discussed:  Emotions Management: Understanding Emotions: Patients discussed the purpose of emotions and function they serve in our lives.  Reviewed core emotions, why they happen (triggers), and how they occur. The group assisted one anothers' understanding that: emotional experiences are important; difficult emotions have a place in our lives; and the differences between various emotions.    Patient Session Goals / Objectives:    Demonstrate understanding of types various emotions    Identify and discuss specific emotions and when they occur; understand triggers    Discuss barriers to emotional regulation      Patient Participation / Response:  Minimally participated, only when prompted / asked.  Patient engaged in conversation when prompted and gave and example of how a injury takes time to heal, referring to distressing or emotional times.  Expressed understanding of the relevance / importance of emotions management skills at distressing times in life    Treatment Plan:  Patient has a current master individualized treatment plan.  See Epic treatment plan for more information.    Alma Ramires

## 2019-12-17 NOTE — ADDENDUM NOTE
Encounter addended by: Concepcion Carrillo PsyD on: 12/17/2019 12:01 PM   Actions taken: Charge Capture section accepted

## 2019-12-17 NOTE — PROGRESS NOTES
Past Medical History:   Diagnosis Date     ADHD      Amblyopia, unspecified      Bipolar 1 disorder (H)      Esotropia, unspecified      Gender dysphoria      Lack of normal physiological development, unspecified     Normal chromosomes, nl MRI, neg fragile X     Learning disabilities      Redundant prepuce and phimosis     Penile coronal adhesions-repaired     Umbilical hernia without mention of obstruction or gangrene     repaired     Unspecified otitis media     Recurrent       Current Outpatient Medications:      acetylcysteine (N-ACETYL CYSTEINE) 600 MG CAPS capsule, Take one po bid, Disp: 60 capsule, Rfl: 3     clonazePAM (KLONOPIN) 1 MG tablet, 0.5 mg 2 times daily as needed (0.5 mg in AM,  1mg PM, then 1 mg PRN) Take 0.5 -1 tablet in the morning, and may repeat one additional dose q day prn, Disp: 90 tablet, Rfl: 1     guanFACINE HCl (INTUNIV) 4 MG TB24, Take one daily for ADHD, Disp: 30 tablet, Rfl: 0     lithium (ESKALITH) 450 MG CR tablet, 450 mg in AM and 450 mg in HS, Disp: 90 tablet, Rfl: 3     lithium (ESKALITH/LITHOBID) 300 MG CR tablet, 750 mg 2 times daily Take 2 po q am and one po q hs along with 450mg dose, Disp: 90 tablet, Rfl: 1     loratadine (CLARITIN) 10 MG capsule, Take 10 mg by mouth daily, Disp: 30 capsule, Rfl: 3     lurasidone (LATUDA) 80 MG TABS tablet, Take 160 mg by mouth, Disp: , Rfl:      melatonin 3 MG tablet, Take 1 to 2 tablet  po q hs, Disp: 30 tablet, Rfl: 0     metFORMIN (GLUCOPHAGE) 1000 MG tablet, Take 1 tablet (1,000 mg) by mouth 2 times daily (with meals), Disp: , Rfl:      polyethylene glycol (MIRALAX/GLYCOLAX) powder, Take 17 g (1 capful) by mouth daily PRN constipation, Disp: 1 Bottle, Rfl:      QUEtiapine (SEROQUEL) 400 MG tablet, Take 1 tablet (400 mg) by mouth At Bedtime Take 2 (200mg) po q hs, Disp: 30 tablet, Rfl: 3     QUEtiapine (SEROQUEL) 50 MG tablet, 50 mg as needed Take 1-2 po TID prn, Disp: 120 tablet, Rfl: 1     sodium chloride (OCEAN) 0.65 % nasal spray,  Spray 2 sprays into both nostrils daily as needed for congestion, Disp: 30 mL, Rfl: 0    Psychiatry staffing: case discussed  Diagnosis:  Bipolar vs schizoaffective, ASD, ADHD, gender dysphoria, anxiety.  Working on anger issues.

## 2019-12-19 ENCOUNTER — HOSPITAL ENCOUNTER (OUTPATIENT)
Dept: BEHAVIORAL HEALTH | Facility: CLINIC | Age: 19
End: 2019-12-19
Attending: PSYCHIATRY & NEUROLOGY
Payer: COMMERCIAL

## 2019-12-19 PROCEDURE — G0177 OPPS/PHP; TRAIN & EDUC SERV: HCPCS

## 2019-12-19 PROCEDURE — 90853 GROUP PSYCHOTHERAPY: CPT | Performed by: PSYCHOLOGIST

## 2019-12-19 NOTE — GROUP NOTE
Life Skills/OT Group Note    PATIENT'S NAME: Remy Holloway  MRN:   [id number]  :   2000  ACCT. NUMBER: [id number]  DATE OF SERVICE: 19  START TIME: 10:00 AM  END TIME: 10:50 AM  FACILITATOR: Bryan Gerber OT  TOPIC:  Life Skills Group: Sensory Approaches in Mental Health  Adult Mental Health Day Treatment  TRACK: 2A    NUMBER OF PARTICIPANTS: 5    Summary of Group / Topics Discussed:  Sensory Approaches in Mental Health:  Sensory Enhanced Mindfulness focusing on vestibular and proprioception for grounding oneself. Patients were taught and provided with an opportunity to explore and practice how using sensory enhanced mindfulness practices through an experiential process can help them stay grounded in the present moment as a way to manage mental health symptoms and stressors.         Patient Session Goals / Objectives:    Identified how using sensory enhanced mindfulness practices that are rich in proprioceptive and vestibular input can be used for grounding, stress management, and self regulation      Improved awareness of different types of sensory enhanced mindfulness activities that assist with healthy coping of stress and symptoms      Established a plan for practice of these skills in their own environments    Practiced and reflected on how to generalize taught skills to their everyday life        Patient Participation / Response:  Fully participated with the group by sharing personal reflections / insights and openly received / provided feedback with other participants.    Patient presentation: Seema did a nice job with participation today. She was a bit late for group due to making a phone call but then she stayed in throughout the session and participated with the experiential process and shared her experiences. , Verbalized understanding of content and Patient would benefit from additional opportunities to practice the content to be able to generalize it to their everyday life with  increased intentionality, consistency, and efficacy in support of their psychiatric recovery    Treatment Plan:  Patient has a current master individualized treatment plan.  See Epic treatment plan for more information.    Bryan Gerber, OT

## 2019-12-19 NOTE — GROUP NOTE
Life Skills/OT Group Note    PATIENT'S NAME: Remy Holloway  MRN:   [id number]  :   2000  ACCT. NUMBER: [id number]  DATE OF SERVICE: 19  START TIME: 11:00 AM  END TIME: 11:50 AM  FACILITATOR: Lopez Gray OTR/L  TOPIC:  Life Skills Group: Resiliency Development  Adult Mental Health Day Treatment  TRACK: 2A    NUMBER OF PARTICIPANTS: 5    Summary of Group / Topics Discussed:  Resiliency Development:  Coping Skills(Exercises for Stress Reduction): Patients were taught how to identify stressors, signs of stress, coping skills, and prevention strategies for overall stress management.  Patients were given the opportunity to identify both ongoing and acute mental health symptoms and how to effectively manage these symptoms by developing an effective aftercare plan.  Patients increased awareness of community based resources.    Patient Session Goals / Objectives:    Identified how using coping skills can be used for symptom and stress management       Improved awareness of individualed symptoms and stressors and how to effectively cope     Established a relapse prevention plan to practice these skills in their own environments    Practiced and reflected on how to generalize taught skills to their everyday life          Patient Participation / Response:  Moderately participated, sharing some personal reflections / insights and adequately adequately received / provided feedback with other participants.    Patient presentation: Calm,alert with poor focus and concentration. Took at least one break form group. With encoragement he was able to complete a post-discharge mental health recovery scale in which he received a score of 22/30. His pre-admission score on 10/3 was 11/30.    Treatment Plan:  Patient has a current master individualized treatment plan.  See Epic treatment plan for more information.    NAVEEN Mcdermott/QUAN

## 2019-12-19 NOTE — GROUP NOTE
Process Group Note    PATIENT'S NAME: Remy Holloway  MRN:   [id number]  :   2000  ACCT. NUMBER: [id number]  DATE OF SERVICE: 19  START TIME:  9:00 AM  END TIME:  9:50 AM  FACILITATOR: Concepcion Carrillo PsyD  TOPIC: MH Process Group    Diagnoses:  Psychiatrist Dr Reina Rose MD, [facility]. [phone], fax: [phone]  -Nicolette Maria Doloresnaina @ [employer] [phone], [phone] , fax: [phone]     Therapist: Jaylin Samson In [place]  PCP:  Dr Lana Faustin @ [facility]    Adult Mental Health Day Treatment  TRACK: 2A    NUMBER OF PARTICIPANTS: 5          Data:    Session content: At the start of this group, patients were invited to check in by identifying themselves, describing their current emotional status, and identifying issues to address in this group.   Area(s) of treatment focus addressed in this session included Symptom Management, Personal Safety and Community Resources/Discharge Planning.    Seema reported being safe today. They talked to the group about a problem with texting the running  about issues that they had disagreed and it caused trouble for their relationship. The group discussed how to manage a relationship when people disagree and how to let others have their own opinions, yet still respect each other.  Seema discussed feeling ok that they could still go running but have different opinions.     Therapeutic Interventions/Treatment Strategies:  Psychotherapist offered support, feedback and validation and reinforced use of skills. Treatment modalities used include Cognitive Behavioral Therapy. Interventions include Cognitive Restructuring:  Explored impact of ineffective thoughts / distortions on mood and activity, Coping Skills: Assisted patient in identifying 1-2 healthy distraction skills to reduce overall distress and Discussed meditation skills and addressed ways to implement meditation skills  and Mindfulness: Facilitated discussion of when/how to  use mindfulness skills to benefit general health, mental health symptoms, and stressors.    Assessment:    Patient response:   Patient responded to session by accepting feedback, giving feedback, listening, focusing on goals, being attentive and accepting support    Possible barriers to participation / learning include: severity of symptoms    Health Issues:   None reported       Substance Use Review:   Substance Use: No active concerns identified.    Mental Status/Behavioral Observations  Appearance:   Appropriate   Eye Contact:   Good   Psychomotor Behavior: Normal   Attitude:   Cooperative   Orientation:   All  Speech   Rate / Production: Normal    Volume:  Normal   Mood:    Anxious  Normal  Affect:    Appropriate   Thought Content:   Rumination  Thought Form:  Coherent  Logical     Insight:    Good     Plan:     Safety Plan: Recommended that patient call 911 or go to the local ED should there be a change in any of these risk factors.     Barriers to treatment: None identified    Patient Contracts (see media tab):  None    Substance Use: Not addressed in session     Continue or Discharge: Patient will continue in Day Treatment (DT)  as planned. Patient is likely to benefit from learning and using skills as they work toward the goals identified in their treatment plan.      Patti Alfredo, D,  Licensed Psychologist    December 19, 2019

## 2019-12-23 ENCOUNTER — HOSPITAL ENCOUNTER (OUTPATIENT)
Dept: BEHAVIORAL HEALTH | Facility: CLINIC | Age: 19
End: 2019-12-23
Attending: PSYCHIATRY & NEUROLOGY
Payer: COMMERCIAL

## 2019-12-23 PROCEDURE — G0177 OPPS/PHP; TRAIN & EDUC SERV: HCPCS

## 2019-12-23 PROCEDURE — 90853 GROUP PSYCHOTHERAPY: CPT | Performed by: PSYCHOLOGIST

## 2019-12-23 NOTE — GROUP NOTE
"Process Group Note    PATIENT'S NAME: Remy Holloway  MRN:   [id number]  :   2000  ACCT. NUMBER: [id number]  DATE OF SERVICE: 19  START TIME: 10:00 AM  END TIME: 10:50 AM  FACILITATOR: Concepcion Carrillo PsyD  TOPIC:  Process Group    Diagnoses:    Psychiatrist Dr Reina Rose MD, [facility]. [phone], fax: [phone]  -Nicolette Cook @ [employer] [phone], [phone] , fax: [phone]     Therapist: Jaylin Samson In [place]  PCP:  Dr Lana Faustin @ [facility]    Adult Mental Health Day Treatment  TRACK: 2A    NUMBER OF PARTICIPANTS: 4          Data:    Session content: At the start of this group, patients were invited to check in by identifying themselves, describing their current emotional status, and identifying issues to address in this group.   Area(s) of treatment focus addressed in this session included Symptom Management, Personal Safety and Community Resources/Discharge Planning.    Seema reported being safe today. Seema stated that they felt frustrated, since they talked about having a \"crush\" on older adults and many other support people and parents did not like that. Seema talked with the group about this and determined that the word \"crush\" was the problem, since Seema did not have any intention of marrying these people and wouldn't consider a partner until much older in 40's. The group validated Seema's feelings.     Therapeutic Interventions/Treatment Strategies:  Psychotherapist offered support, feedback and validation and reinforced use of skills. Treatment modalities used include Cognitive Behavioral Therapy. Interventions include Cognitive Restructuring:  Explored impact of ineffective thoughts / distortions on mood and activity and Assisted patient in formulating new neutral/positive alternatives to challenge less helpful / ineffective thoughts, Coping Skills: Discussed meditation skills and addressed ways to implement meditation skills  and " Mindfulness: Encouraged a plan to use mindfulness skills in daily life.    Assessment:    Patient response:   Patient responded to session by accepting feedback, giving feedback, listening, focusing on goals, being attentive and accepting support    Possible barriers to participation / learning include: severity of symptoms    Health Issues:   None reported       Substance Use Review:   Substance Use: No active concerns identified.    Mental Status/Behavioral Observations  Appearance:   Appropriate   Eye Contact:   Good   Psychomotor Behavior: Normal   Attitude:   Cooperative   Orientation:   All  Speech   Rate / Production: Slow    Volume:  Normal   Mood:    Anxious   Affect:    Appropriate   Thought Content:   Rumination  Thought Form:  Logical  Tangential     Insight:    Fair     Plan:     Safety Plan: Recommended that patient call 911 or go to the local ED should there be a change in any of these risk factors.     Barriers to treatment: None identified    Patient Contracts (see media tab):  None    Substance Use: Not addressed in session     Continue or Discharge: Patient will continue in Day Treatment (DT)  as planned. Patient is likely to benefit from learning and using skills as they work toward the goals identified in their treatment plan.  Stabilization of symptoms is needed.    Yoana Alfredo., D,  Licensed Psychologist         December 23, 2019

## 2019-12-23 NOTE — GROUP NOTE
EBP Group Note    PATIENT'S NAME: Remy Holloway  MRN:   [id number]  :   2000  ACCT. NUMBER: [id number]  DATE OF SERVICE: 19  START TIME: 11:00 AM  END TIME: 11:50 AM  FACILITATOR: Concepcion Carrillo PsyD  TOPIC:  EBP Group: Coping Skills  Adult Mental Health Day Treatment  TRACK: 2A    NUMBER OF PARTICIPANTS: 4    Summary of Group / Topics Discussed:  Coping Skills: Calming Strategies: Patients discussed and practiced strategies to increase sense of calm in the body.  Reviewed the benefits of calming strategies as well as past / current practices of each member.  Patients identified situations in which using these strategies would reduce stress. They developed the ability to distinguish when these strategies can be useful in their lives for management and stress and psychological well-being.    Patient Session Goals / Objectives:    Understand the purpose of using calming strategies to reduce stress    Review patients current calming practices and discuss a more formal way of practicing and accessing skills.    Increase ability to decide when to use various calming strategies (e.g. Progressive muscle relaxation, deep breathing, guided imagery, + thoughts).    Choose 1-2 calming strategies to apply during times of distress.        Patient Participation / Response:  Fully participated with the group by sharing personal reflections / insights and openly received / provided feedback with other participants.    Expressed understanding of the relevance / importance of coping skills at distressing times in life and Demonstrated knowledge of when to consider using a variety of coping skills in daily life    Treatment Plan:  Patient has a current master individualized treatment plan.  See Epic treatment plan for more information.     Patti Alfredo, D,  Licensed Psychologist

## 2019-12-23 NOTE — GROUP NOTE
RN Group Note    PATIENT'S NAME: Remy Holloway  MRN:   [id number]  :   2000  ACCT. NUMBER: [id number]  DATE OF SERVICE: 19  START TIME:  9:00 AM  END TIME:  9:50 AM  FACILITATOR: Kezia Cleaning RN  TOPIC:  RN Group: [facility]  Adult Mental Health Day Treatment  TRACK: 2A    NUMBER OF PARTICIPANTS: 4    Summary of Group / Topics Discussed:  Foundations of Health: Sleep: Pathophysiology of sleep disorders: The anatomy of sleep was reviewed including structures, stages, mechanisms, and the purpose that sleep has on the brain. Sleep disorders were discussed within the group with a focus on gaining knowledge about the etiology and pathophysiology of insomnia, sleep apnea, restless leg syndrome, narcolepsy, medications that can cause risk for sleeping disorders, and other symptoms/factors that may interfere with sleep. Risk factors for developing sleep disorders were discussed and treatments/pharmacological options were explored.     Patient Session Goals / Objectives:  ? Described the effect and purpose of sleep on the brain and identify the amount of sleep needed  ? Identified differences between sleep disorders and risks associated with sleep disorders  ? Described the connection between sleep disturbances and mental illness    Increased knowledge about treatments for sleep disorders      Patient Participation / Response:  Fully participated with the group by sharing personal reflections / insights and openly received / provided feedback with other participants.    Demonstrated understanding of topics discussed through group discussion and participation, Identified / Expressed personal readiness to practice skills and Verbalized understanding of foundations of health topic    Treatment Plan:  Patient has a current master individualized treatment plan.  See Epic treatment plan for more information.    Kezia Cleaning RN

## 2019-12-26 NOTE — ADDENDUM NOTE
Encounter addended by: Concepcion Carrillo PsyD on: 12/26/2019 3:39 PM   Actions taken: Charge Capture section accepted

## 2019-12-30 ENCOUNTER — TELEPHONE (OUTPATIENT)
Dept: BEHAVIORAL HEALTH | Facility: CLINIC | Age: 19
End: 2019-12-30

## 2019-12-30 NOTE — TELEPHONE ENCOUNTER
Patient was no call, no show to ADT today. Writer called patient to check on safety and see why they were not in group.    No answer. Writer left a voicemail with a call-back number.    Kezia Cleaning RN on 12/30/2019 at 1:28 PM

## 2019-12-30 NOTE — TELEPHONE ENCOUNTER
Patient's guardian (father) called writer back to let them know that the patient was out of group today for a pre-planned family trip. Father stated that Seema will be back to the program on Tuesday of next week (January 7, 2020).    Writer thanked pt guardian for the call an update.    Kezia Cleaning RN on 12/30/2019 at 1:41 PM

## 2020-01-07 ENCOUNTER — HOSPITAL ENCOUNTER (OUTPATIENT)
Dept: BEHAVIORAL HEALTH | Facility: CLINIC | Age: 20
End: 2020-01-07
Attending: PSYCHIATRY & NEUROLOGY
Payer: COMMERCIAL

## 2020-01-07 PROCEDURE — G0177 OPPS/PHP; TRAIN & EDUC SERV: HCPCS | Performed by: COUNSELOR

## 2020-01-07 PROCEDURE — 90853 GROUP PSYCHOTHERAPY: CPT | Performed by: COUNSELOR

## 2020-01-07 PROCEDURE — G0177 OPPS/PHP; TRAIN & EDUC SERV: HCPCS

## 2020-01-07 NOTE — GROUP NOTE
RN Group Note    PATIENT'S NAME: Remy Holloway  MRN:   [id number]  :   2000  ACCT. NUMBER: [id number]  DATE OF SERVICE: 20  START TIME: 11:00 AM  END TIME: 11:50 AM  FACILITATOR: Rebeca Melendez LPCC  TOPIC:  RN Group: Health Maintenance  Adult Mental Health Day Treatment  TRACK: 2A    NUMBER OF PARTICIPANTS: 6    Summary of Group / Topics Discussed:  Health Maintenance: Goal Setting: Meaningful goals can bring a sense of direction and purpose in life.  They also highlight our most important values. Patients were assisted by instructor to identify short term goals to promote their mental health recovery and improve overall health and wellness. Patients were educated on SMART goal setting framework as a strategy to increase outcomes and promote success.    Patient Session Goals / Objectives:  ? Explained the key concepts of SMART goal setting framework  ? Identified three goals successfully using SMART goal setting framework  ? Reviewed concept of balance in wellness as it pertains to goal setting        Patient Participation / Response:  Fully participated with the group by sharing personal reflections / insights and openly received / provided feedback with other participants.    Demonstrated understanding of topics discussed through group discussion and participation    Treatment Plan:  Patient has a current master individualized treatment plan.  See Epic treatment plan for more information.    KAILASH Nelson

## 2020-01-07 NOTE — GROUP NOTE
Life Skills/OT Group Note    PATIENT'S NAME: Remy Holloway  MRN:   [id number]  :   2000  ACCT. NUMBER: [id number]  DATE OF SERVICE: 20  START TIME:  9:00 AM  END TIME:  9:50 AM  FACILITATOR: Bryan Gerber OT  TOPIC:  Life Skills Group: Communication and Social Skills Development  Adult Mental Health Day Treatment  TRACK: 2A    NUMBER OF PARTICIPANTS: 5    Summary of Group / Topics Discussed:  Communication and Social Skills Development: Social Supports: Social Support Scale: Patients completed a social support self assessment to identify people who are supportive and to evaluate the effectiveness of their social support system.  Patients were taught and gained awareness of characteristics of an effective support and how to develop this in their lives.  Patients identified both personal strengths and opportunities for growth in this areas to improve overall communication and connection with other people.    Patient Session Goals / Objectives:    Identified strengths and opportunities for growth in developing their social support system and how this impacts their ability to connect and communicate with other people       Improved awareness of important aspects of social support systems and how this relates to mental health recovery        Established a plan for practice of these skills in their own environments    Practiced and reflected on how to generalize taught skills to their everyday life        Patient Participation / Response:  Moderately participated, sharing some personal reflections / insights and adequately adequately received / provided feedback with other participants.    Verbalized understanding of content and Patient would benefit from additional opportunities to practice the content to be able to generalize it to their everyday life with increased intentionality, consistency, and efficacy in support of their psychiatric recovery    Treatment Plan:  Patient has a current master  individualized treatment plan.  See Epic treatment plan for more information.    Bryan Gerber, OT

## 2020-01-07 NOTE — GROUP NOTE
Process Group Note    PATIENT'S NAME: Remy Holloway  MRN:   [id number]  :   2000  ACCT. NUMBER: [id number]  DATE OF SERVICE: 20  START TIME: 10:00 AM  END TIME: 10:50 AM  FACILITATOR: Rebeca Melendez LPCC  TOPIC: MH Process Group    Diagnoses:  Psychiatrist Dr Reina Rose MD, [facility]. [phone], fax: [phone]  -Nicolette Cook @ [employer] [phone], [phone] , fax: [phone]     Therapist: Jayiln Samson In [place]  PCP:  Dr Lana Faustin @ [facility]    Adult Mental Health Day Treatment  TRACK: 2A    NUMBER OF PARTICIPANTS: 6          Data:    Session content: At the start of this group, patients were invited to check in by identifying themselves, describing their current emotional status, and identifying issues to address in this group.   Area(s) of treatment focus addressed in this session included Symptom Management and Personal Safety.  She reported feeling conflicted about his PCA having availability and that they don't always have the same view point on certain topics. Patient mentioned that they were on a trip to [place] and their symptoms, at times, elevated and decreased. She denied any safety concerns.     Therapeutic Interventions/Treatment Strategies:  Psychotherapist offered support, feedback and validation, set limits and provided redirection. Interventions include Cognitive Restructuring:  Assisted patient in formulating new neutral/positive alternatives to challenge less helpful / ineffective thoughts.    Assessment:    Patient response:   Patient responded to session by accepting feedback, listening and being attentive    Possible barriers to participation / learning include: and no barriers identified    Health Issues:   None reported       Substance Use Review:   Substance Use: No active concerns identified.    Mental Status/Behavioral Observations  Appearance:   Appropriate   Eye Contact:   Good   Psychomotor Behavior: Retarded (Slowed)    Attitude:   Cooperative   Orientation:   All  Speech   Rate / Production: Monotone  Slow    Volume:  Normal   Mood:    Depressed   Affect:    Lethargic   Thought Content:   Rumination  Thought Form:  Coherent  Circumstantial    Insight:    Fair     Plan:     Safety Plan: No current safety concerns identified.  Recommended that patient call 911 or go to the local ED should there be a change in any of these risk factors.     Barriers to treatment: None identified    Patient Contracts (see media tab):  None    Substance Use: Not addressed in session     Continue or Discharge: Patient will continue in Adult Day Treatment (ADT)  as planned. Patient is likely to benefit from learning and using skills as they work toward the goals identified in their treatment plan.      Rebeca Melendez, JNC  January 7, 2020

## 2020-01-09 ENCOUNTER — HOSPITAL ENCOUNTER (OUTPATIENT)
Dept: BEHAVIORAL HEALTH | Facility: CLINIC | Age: 20
End: 2020-01-09
Attending: PSYCHIATRY & NEUROLOGY
Payer: COMMERCIAL

## 2020-01-09 PROCEDURE — G0177 OPPS/PHP; TRAIN & EDUC SERV: HCPCS

## 2020-01-09 PROCEDURE — 90853 GROUP PSYCHOTHERAPY: CPT | Performed by: COUNSELOR

## 2020-01-09 PROCEDURE — G0177 OPPS/PHP; TRAIN & EDUC SERV: HCPCS | Performed by: OCCUPATIONAL THERAPIST

## 2020-01-09 NOTE — GROUP NOTE
Life Skills/OT Group Note    PATIENT'S NAME: Remy Holloway  MRN:   [id number]  :   2000  [id number]  DATE OF SERVICE: 20  START TIME: 11:00 AM  END TIME: 11:50 AM  FACILITATOR: Fabian Garcia OTR/L  TOPIC:  Life Skills Group: Cognitive Functioning  Adult Mental Health Day Treatment  TRACK: 2A    NUMBER OF PARTICIPANTS: 6    Summary of Group / Topics Discussed:  Cognitive Functioning: Patients were taught and provided with an opportunity to gain awareness of how their mental health symptoms impact their current cognitive functioning as well as how this impacts their performance and participation in meaningful roles, relationships, and routines.  Patients were taught skills and strategies on how to monitor and improve cognitive performance through remediation or compensatory strategies.  Patients were given opportunities to practice taught skills and techniques in session and how to apply to everyday life. Topic for session was specific to problem solving skills    Patient Session Goals / Objectives:    Identified how their mental health symptoms impact their functioning, focusing on specific cognitive challenges     Improved awareness of specific remediation and/or compensatory strategies to improve  executive functioning skills and how this relates to mental health recovery        Established a plan for practice of these skills in their own environments    Practiced and reflected on how to generalize taught skills to their everyday life          Patient Participation / Response:  Moderately participated, sharing some personal reflections / insights and adequately adequately received / provided feedback with other participants.    Patient presentation: initially struggled to focus in the group; then did work briefly on provided handout and shared that struggles with problem solving; noted uncertain as to part of process most difficult, but open to brief discussion on getting  assistance with brain storming options, Verbalized understanding of content and Patient would benefit from additional opportunities to practice the content to be able to generalize it to their everyday life with increased intentionality, consistency, and efficacy in support of their psychiatric recovery    Treatment Plan:  Patient has a current master individualized treatment plan.  See Epic treatment plan for more information.    Fabian Garcia, OTR/L

## 2020-01-09 NOTE — GROUP NOTE
Process Group Note    PATIENT'S NAME: Remy Holloway  MRN:   [id number]  :   2000  ACCT. NUMBER: [id number]  DATE OF SERVICE: 20  START TIME:  9:00 AM  END TIME:  9:50 AM  FACILITATOR: Rebeca Melendez LPCC  TOPIC: MH Process Group    Diagnoses:  [facility] [phone], fax: [phone]  -Nicolette Cook @ [employer] [phone], [phone] , fax: [phone]     Therapist: Jaylin Samson In [place]  PCP:  Dr Lana Faustin @ [facility]    Adult Mental Health Day Treatment  TRACK: 2A    NUMBER OF PARTICIPANTS: 6          Data:    Session content: At the start of this group, patients were invited to check in by identifying themselves, describing their current emotional status, and identifying issues to address in this group.   Area(s) of treatment focus addressed in this session included Develop Socialization / Interpersonal Relationship Skills.  She reported feeling anxious about their interactions with her father while her mother is away and that she hasn't spent as much time with her PCA as she would like. She noted difficulty with boundaries with her PCA and setting expectations and as a result becomes anxious. Patient noted looking forward to spending time with the PCA today and all day next Saturday. Patient denied any safety concerns.     Therapeutic Interventions/Treatment Strategies:  Psychotherapist offered support, feedback and validation and set limits. Treatment modalities used include Cognitive Behavioral Therapy and Dialectical Behavioral Therapy. Interventions include Coping Skills: Assisted patient in identifying 1-2 healthy distraction skills to reduce overall distress and Relationship Skills: Assisted patients in implementing more effective communication skills in their relationships and Encouraged development and maintenance  of healthy boundaries.    Assessment:    Patient response:   Patient responded to session by accepting  feedback, giving feedback and listening    Possible barriers to participation / learning include: and no barriers identified    Health Issues:   None reported       Substance Use Review:   Substance Use: No active concerns identified.    Mental Status/Behavioral Observations  Appearance:   Appropriate   Eye Contact:   Good   Psychomotor Behavior: Retarded (Slowed)   Attitude:   Cooperative   Orientation:   All  Speech   Rate / Production: Monotone  Slow    Volume:  Normal   Mood:    Anxious   Affect:    Blunted  Flat   Thought Content:   Rumination  Thought Form:  Coherent  Tangential     Insight:    Fair     Plan:     Safety Plan: No current safety concerns identified.  Recommended that patient call 911 or go to the local ED should there be a change in any of these risk factors.     Barriers to treatment: None identified    Patient Contracts (see media tab):  None    Substance Use: Not addressed in session     Continue or Discharge: Patient will continue in Adult Day Treatment (ADT)  as planned. Patient is likely to benefit from learning and using skills as they work toward the goals identified in their treatment plan.      Rebeca Melendez, [facility]  January 9, 2020

## 2020-01-09 NOTE — GROUP NOTE
Life Skills/OT Group Note    PATIENT'S NAME: Remy Holloway  MRN:   [id number]  :   2000  ACCT. NUMBER: [id number]  DATE OF SERVICE: 20  START TIME: 10:00 AM  END TIME: 10:50 AM  FACILITATOR: Bryan Gerber OT  TOPIC:  Life Skills Group: Resiliency Development  Adult Mental Health Day Treatment  TRACK: 2A    NUMBER OF PARTICIPANTS: 6    Summary of Group / Topics Discussed:  Resiliency Development:  Coping Skills: Aftercare Coping Cards: Patients were taught how to begin to develop a relapse management kit for both mental and substance use/abuse by creating individualized coping cards.    Patient Session Goals / Objectives:    Identified individualized coping skills they can use for effectively managing both mental health and substance abuse/abuse symptoms     Improved awareness of different types of coping skills and how to personalize them to their unique situation and circumstances      Established a plan for practice of these skills in their own environments    Practiced and reflected on how to generalize taught skills to their everyday life        Patient Participation / Response:  Moderately participated, sharing some personal reflections / insights and adequately adequately received / provided feedback with other participants.    Verbalized understanding of content and Patient would benefit from additional opportunities to practice the content to be able to generalize it to their everyday life with increased intentionality, consistency, and efficacy in support of their psychiatric recovery    Treatment Plan:  Patient has a current master individualized treatment plan.  See Epic treatment plan for more information.    Bryan Gerber OT

## 2020-01-10 NOTE — PROGRESS NOTES
Past Medical History:   Diagnosis Date     ADHD      Amblyopia, unspecified      Bipolar 1 disorder (H)      Esotropia, unspecified      Gender dysphoria      Lack of normal physiological development, unspecified     Normal chromosomes, nl MRI, neg fragile X     Learning disabilities      Redundant prepuce and phimosis     Penile coronal adhesions-repaired     Umbilical hernia without mention of obstruction or gangrene     repaired     Unspecified otitis media     Recurrent       Current Outpatient Medications:      acetylcysteine (N-ACETYL CYSTEINE) 600 MG CAPS capsule, Take one po bid, Disp: 60 capsule, Rfl: 3     clonazePAM (KLONOPIN) 1 MG tablet, 0.5 mg 2 times daily as needed (0.5 mg in AM,  1mg PM, then 1 mg PRN) Take 0.5 -1 tablet in the morning, and may repeat one additional dose q day prn, Disp: 90 tablet, Rfl: 1     guanFACINE HCl (INTUNIV) 4 MG TB24, Take one daily for ADHD, Disp: 30 tablet, Rfl: 0     lithium (ESKALITH) 450 MG CR tablet, 450 mg in AM and 450 mg in HS, Disp: 90 tablet, Rfl: 3     lithium (ESKALITH/LITHOBID) 300 MG CR tablet, 750 mg 2 times daily Take 2 po q am and one po q hs along with 450mg dose, Disp: 90 tablet, Rfl: 1     loratadine (CLARITIN) 10 MG capsule, Take 10 mg by mouth daily, Disp: 30 capsule, Rfl: 3     lurasidone (LATUDA) 80 MG TABS tablet, Take 160 mg by mouth, Disp: , Rfl:      melatonin 3 MG tablet, Take 1 to 2 tablet  po q hs, Disp: 30 tablet, Rfl: 0     metFORMIN (GLUCOPHAGE) 1000 MG tablet, Take 1 tablet (1,000 mg) by mouth 2 times daily (with meals), Disp: , Rfl:      polyethylene glycol (MIRALAX/GLYCOLAX) powder, Take 17 g (1 capful) by mouth daily PRN constipation, Disp: 1 Bottle, Rfl:      QUEtiapine (SEROQUEL) 400 MG tablet, Take 1 tablet (400 mg) by mouth At Bedtime Take 2 (200mg) po q hs, Disp: 30 tablet, Rfl: 3     QUEtiapine (SEROQUEL) 50 MG tablet, 50 mg as needed Take 1-2 po TID prn, Disp: 120 tablet, Rfl: 1     sodium chloride (OCEAN) 0.65 % nasal spray,  Spray 2 sprays into both nostrils daily as needed for congestion, Disp: 30 mL, Rfl: 0  Psychiatry staffing: Case discussed with treatment team  Diagnosis:  ASD, MDD, transgender.  Improving

## 2020-01-13 ENCOUNTER — HOSPITAL ENCOUNTER (OUTPATIENT)
Dept: BEHAVIORAL HEALTH | Facility: CLINIC | Age: 20
End: 2020-01-13
Attending: PSYCHIATRY & NEUROLOGY
Payer: COMMERCIAL

## 2020-01-13 PROCEDURE — 90853 GROUP PSYCHOTHERAPY: CPT | Performed by: PSYCHOLOGIST

## 2020-01-13 PROCEDURE — G0177 OPPS/PHP; TRAIN & EDUC SERV: HCPCS

## 2020-01-13 NOTE — GROUP NOTE
RN Group Note    PATIENT'S NAME: Remy Holloway  MRN:   [id number]  :   2000  ACCT. NUMBER: [id number]  DATE OF SERVICE: 20  START TIME:  9:00 AM  END TIME:  9:50 AM  FACILITATOR: Kezia Cleaning RN  TOPIC:  RN Group: Mental Health Maintenance  Adult Mental Health Day Treatment  TRACK: 2B    NUMBER OF PARTICIPANTS: 4    Summary of Group / Topics Discussed:  Mental Health Maintenance:  Coping Bingo: Patients were educated on the importance of balance in meeting our wellness needs. Topic of coping was reviewed and patients participated in playing a verbal response style coping BINGO game. In this game, patients were challenged to utilize their understanding of themselves and their coping strategies to respond to the questions on their BINGO cards.    Patient Session Goals / Objectives:  ? Identified the importance of balance in wellness  ? Explained the important aspects of effective coping strategies  ? Listed ways of improving weak areas in coping skills      Patient Participation / Response:  Fully participated with the group by sharing personal reflections / insights and openly received / provided feedback with other participants.    Demonstrated understanding of topics discussed through group discussion and participation, Identified / Expressed personal readiness to practice skills and Verbalized understanding of mental health maintenance topic    Treatment Plan:  Patient has a current master individualized treatment plan.  See Epic treatment plan for more information.    Kezia Cleaning RN

## 2020-01-13 NOTE — GROUP NOTE
EBP Group Note    PATIENT'S NAME: Remy Holloway  MRN:   [id number]  :   2000  [id number]  DATE OF SERVICE: 20  START TIME: 11:00 AM  END TIME: 11:50 AM  FACILITATOR: Concepcion Carrillo PsyD  TOPIC:  EBP Group: Relationship Skills  Adult Mental Health Day Treatment  TRACK: 2A    NUMBER OF PARTICIPANTS: 5    Summary of Group / Topics Discussed:  Relationship Skills: Boundaries: Patients were provided with a general overview of interpersonal boundaries and how lack of boundaries relates to symptoms and functioning. The purpose is to help patients identify boundary issues and gain awareness and skills to work towards healthier interpersonal boundaries. Current awareness of healthy boundary characteristics and barriers to establishing healthy boundaries were discussed.    Patient Session Goals / Objectives:    Familiarized patients with the concept of interpersonal boundaries and their characteristics    Discussed and practiced strategies to promote healthier interpersonal boundaries    Identified boundary issues and identified plan to improve boundaries      Patient Participation / Response:  Fully participated with the group by sharing personal reflections / insights and openly received / provided feedback with other participants.    Demonstrated understanding of relationship skills and communication skills    Treatment Plan:  Patient has a current master individualized treatment plan.  See Epic treatment plan for more information.    aPtti Alfredo, D,  Licensed Psychologist

## 2020-01-13 NOTE — GROUP NOTE
Process Group Note    PATIENT'S NAME: Remy Holloway  MRN:   [id number]  :   2000  ACCT. NUMBER: [id number]  DATE OF SERVICE: 20  START TIME: 10:00 AM  END TIME: 10:50 AM  FACILITATOR: Concepcion Carrillo PsyD  TOPIC: MH Process Group    Diagnoses:    Psychiatrist Dr Reina Rose MD, [facility]. [phone], fax: [phone]  -Nicolette Cook @ [employer] [phone], [phone] , fax: [phone]     Therapist: Jaylin Samson In [place]  PCP:  Dr Lana Faustin @ [facility]    Adult Mental Health Day Treatment  TRACK: 2A    NUMBER OF PARTICIPANTS: 6          Data:    Session content: At the start of this group, patients were invited to check in by identifying themselves, describing their current emotional status, and identifying issues to address in this group.   Area(s) of treatment focus addressed in this session included Symptom Management, Personal Safety and Community Resources/Discharge Planning.    Seema reported being safe today. She reported that she talked to the running  about a sex change operation and whether it was ok, and was surprised by the response. Seema talked about how they had different religions and opinions. The group validated her about being open and patient with the running .     Therapeutic Interventions/Treatment Strategies:  Psychotherapist offered support, feedback and validation and reinforced use of skills. Treatment modalities used include Cognitive Behavioral Therapy. Interventions include Behavioral Activation: Explored how behaviors effect mood and interact with thoughts and feelings, Coping Skills: Assisted patient in identifying 1-2 healthy distraction skills to reduce overall distress and Mindfulness: Encouraged a plan to use mindfulness skills in daily life.    Assessment:    Patient response:   Patient responded to session by accepting feedback, giving feedback, listening, focusing on goals, being attentive and accepting  support    Possible barriers to participation / learning include: severity of symptoms    Health Issues:   None reported       Substance Use Review:   Substance Use: No active concerns identified.    Mental Status/Behavioral Observations  Appearance:   Appropriate   Eye Contact:   Good   Psychomotor Behavior: Normal   Attitude:   Cooperative   Orientation:   All  Speech   Rate / Production: Normal    Volume:  Normal   Mood:    Anxious  Normal  Affect:    Appropriate   Thought Content:   Rumination  Thought Form:  Coherent  Logical     Insight:    Good     Plan:     Safety Plan: Recommended that patient call 911 or go to the local ED should there be a change in any of these risk factors.     Barriers to treatment: None identified    Patient Contracts (see media tab):  None    Substance Use: Not addressed in session     Continue or Discharge: Patient will continue in Adult Day Treatment (ADT)  as planned. Patient is likely to benefit from learning and using skills as they work toward the goals identified in their treatment plan.    Stabilization of symptoms is needed.    Yoana Alfredo., D,  Licensed Psychologist

## 2020-01-14 ENCOUNTER — HOSPITAL ENCOUNTER (OUTPATIENT)
Dept: BEHAVIORAL HEALTH | Facility: CLINIC | Age: 20
End: 2020-01-14
Attending: PSYCHIATRY & NEUROLOGY
Payer: COMMERCIAL

## 2020-01-14 PROCEDURE — 90853 GROUP PSYCHOTHERAPY: CPT | Performed by: PSYCHOLOGIST

## 2020-01-14 PROCEDURE — G0177 OPPS/PHP; TRAIN & EDUC SERV: HCPCS

## 2020-01-14 PROCEDURE — G0177 OPPS/PHP; TRAIN & EDUC SERV: HCPCS | Performed by: COUNSELOR

## 2020-01-14 NOTE — GROUP NOTE
"Process Group Note    PATIENT'S NAME: Remy Holloway  MRN:   [id number]  :   2000  ACCT. NUMBER: [id number]  DATE OF SERVICE: 20  START TIME: 10:00 AM  END TIME: 10:50 AM  FACILITATOR: Concepcion Carrillo PsyD  TOPIC: MH Process Group    Diagnoses:  Psychiatrist Dr Reina Rose MD, [facility]. [phone], fax: [phone]  -Nicolette Cook @ [employer] [phone], [phone] , fax: [phone]     Therapist: Jaylin Samson In [place]  PCP:  Dr Lana Faustin @ [facility]    Adult Mental Health Day Treatment  TRACK: 2A    NUMBER OF PARTICIPANTS: 5          Data:    Session content: At the start of this group, patients were invited to check in by identifying themselves, describing their current emotional status, and identifying issues to address in this group.   Area(s) of treatment focus addressed in this session included Symptom Management, Personal Safety and Community Resources/Discharge Planning.    Seema reported having suicidal thoughts today, with no intent or plan. She talked to the group about problems with text messages and trying to state facts, but not send insults. She talked to the group about how a person was 10 minutes late and she was upset by this. She reported that she sent a text about this and how the person had cancelled several times in the past week.  She did not identify one person as a problem relationship and talked about her therapist, and how she felt the therapist was \"short\" with her during the last group session, so Seema sent a text stating that she \"was fed up with her.\"  She reported that she talked with her psychiatrist about this, as the therapist sent it to the psychiatrist.     Therapeutic Interventions/Treatment Strategies:  Psychotherapist offered support, feedback and validation, provided redirection and reinforced use of skills. Treatment modalities used include Cognitive Behavioral Therapy. Interventions include Cognitive Restructuring:  " Explored impact of ineffective thoughts / distortions on mood and activity, Coping Skills: Discussed use of self-soothe skills to decrease distress in the body and Assisted patient in identifying 1-2 healthy distraction skills to reduce overall distress, Mindfulness: Encouraged a plan to use mindfulness skills in daily life and Emotions Management:  Increased awareness of daily mood patterns/changes.    Assessment:    Patient response:   Patient responded to session by accepting feedback, giving feedback, listening, focusing on goals, being attentive and accepting support    Possible barriers to participation / learning include: severity of symptoms    Health Issues:   None reported       Substance Use Review:   Substance Use: No active concerns identified.    Mental Status/Behavioral Observations  Appearance:   Appropriate   Eye Contact:   Good   Psychomotor Behavior: Normal   Attitude:   Cooperative   Orientation:   All  Speech   Rate / Production: Normal    Volume:  Normal   Mood:    Anxious   Affect:    Constricted   Thought Content:   Rumination  Thought Form:  Coherent  Logical     Insight:    Fair     Plan:     Safety Plan: Recommended that patient call 911 or go to the local ED should there be a change in any of these risk factors.     Barriers to treatment: None identified    Patient Contracts (see media tab):  None    Substance Use: Not addressed in session     Continue or Discharge: Patient will continue in Adult Day Treatment (ADT)  as planned. Patient is likely to benefit from learning and using skills as they work toward the goals identified in their treatment plan.     Stabilization of symptoms is needed.        Yoana Alfredo., D,  Licensed Psychologist   January 14, 2020

## 2020-01-14 NOTE — GROUP NOTE
RN Group Note    PATIENT'S NAME: Remy Holloway  MRN:   [id number]  :   2000  ACCT. NUMBER: [id number]  DATE OF SERVICE: 20  START TIME: 11:00 AM  END TIME: 11:50 AM  FACILITATOR: Rebeca Melendez LPCC  TOPIC:  RN Group: Health Maintenance  Adult Mental Health Day Treatment  TRACK: 2A    NUMBER OF PARTICIPANTS: 4    Summary of Group / Topics Discussed:  Health Maintenance: Goal Setting: Meaningful goals can bring a sense of direction and purpose in life.  They also highlight our most important values. Patients were assisted by instructor to identify short term goals to promote their mental health recovery and improve overall health and wellness. Patients were educated on SMART goal setting framework as a strategy to increase outcomes and promote success.    Patient Session Goals / Objectives:  ? Explained the key concepts of SMART goal setting framework  ? Identified three goals successfully using SMART goal setting framework  ? Reviewed concept of balance in wellness as it pertains to goal setting        Patient Participation / Response:  Fully participated with the group by sharing personal reflections / insights and openly received / provided feedback with other participants.    Demonstrated understanding of topics discussed through group discussion and participation    Treatment Plan:  Patient has a current master individualized treatment plan.  See Epic treatment plan for more information.    KAILASH Nelson

## 2020-01-14 NOTE — GROUP NOTE
Life Skills/OT Group Note    PATIENT'S NAME: Remy Holloway  MRN:   [id number]  :   2000  ACCT. NUMBER: [id number]  DATE OF SERVICE: 20  START TIME:  9:00 AM  END TIME:  9:50 AM  FACILITATOR: Lopez Gray OTR/L  TOPIC:  Life Skills Group: Communication and Social Skills Development  Adult Mental Health Day Treatment  TRACK: 2A    NUMBER OF PARTICIPANTS: 4    Summary of Group / Topics Discussed:  Communication and Social Skills Development: Communication Styles: Communication Skills Scale:Patients completed a self assessment of personal communication skills by identifying both strengths and opportunities for growth in areas of messages, emotions, assertiveness and listening skills.  Patients gained awareness of effective communication skills to improve overall communication and connection with other people.      Patient Session Goals / Objectives:    Identified strengths and opportunities for growth in communication skills and how these  impact their ability to communicate clearly with other people       Improved awareness of important aspects of communication skills and how this relates to mental health recovery        Established a plan for practice of these skills in their own environments    Practiced and reflected on how to generalize taught skills to their everyday life      Patient Participation / Response:  Fully participated with the group by sharing personal reflections / insights and openly received / provided feedback with other participants.    Patient presentation: Calm,alert and focused with stable mood and thought process.    Treatment Plan:  Patient has a current master individualized treatment plan.  See Epic treatment plan for more information.    NAVEEN Mcdermott/QUAN

## 2020-01-16 ENCOUNTER — TELEPHONE (OUTPATIENT)
Dept: BEHAVIORAL HEALTH | Facility: CLINIC | Age: 20
End: 2020-01-16

## 2020-01-16 ENCOUNTER — HOSPITAL ENCOUNTER (OUTPATIENT)
Dept: BEHAVIORAL HEALTH | Facility: CLINIC | Age: 20
End: 2020-01-16
Attending: PSYCHIATRY & NEUROLOGY
Payer: COMMERCIAL

## 2020-01-16 PROCEDURE — 90853 GROUP PSYCHOTHERAPY: CPT | Performed by: PSYCHOLOGIST

## 2020-01-16 PROCEDURE — G0177 OPPS/PHP; TRAIN & EDUC SERV: HCPCS

## 2020-01-16 NOTE — GROUP NOTE
Life Skills/OT Group Note    PATIENT'S NAME: Remy Holloway  MRN:   [id number]  :   2000  ACCT. NUMBER: [id number]  DATE OF SERVICE: 20  START TIME: 10:00 AM  END TIME: 10:50 AM  FACILITATOR: Bryan Gerber OT  TOPIC:  Life Skills Group: Sensory Approaches in Mental Health  Adult Mental Health Day Treatment  TRACK:2A    NUMBER OF PARTICIPANTS: 4    Summary of Group / Topics Discussed:  Sensory Approaches in Mental Health:  Coping Through the Senses Experiential Education: Patients reviewed and learned about neurosensory based skills and strategies related to grounding and connecting oneself to the moment.  Patients were taught about the benefits of and how to tap into the internal senses of proprioception and vestibular through non tool based muscle activation and movement activities and then worked to identify one way to apply taught skills to their everyday contexts such as at home or work or out socially. They practiced each of the skills and then made a plan to use one at some point today to apply the skill.     Patient Session Goals / Objectives:    Identified specific and individualized neurosensory skills to help when distressed      Identified skills learned and how this applies to current daily life    Established a plan for practice of these skills in their own environments        Patient Participation / Response:  Moderately participated, sharing some personal reflections / insights and adequately adequately received / provided feedback with other participants.    Patient presentation: intermittent effort but able to practice skills and apply one of them to her plans for the evening. , Verbalized understanding of content and Patient would benefit from additional opportunities to practice the content to be able to generalize it to their everyday life with increased intentionality, consistency, and efficacy in support of their psychiatric recovery    Treatment Plan:  Patient has a current  master individualized treatment plan.  See Epic treatment plan for more information.    Bryan Gerber, OT

## 2020-01-16 NOTE — TELEPHONE ENCOUNTER
Phone call to parents to coordinate care for Seema.    Yoana Alfredo., D,  Licensed Psychologist

## 2020-01-16 NOTE — GROUP NOTE
Process Group Note    PATIENT'S NAME: Remy Holloway  MRN:   [id number]  :   2000  ACCT. NUMBER: [id number]  DATE OF SERVICE: 20  START TIME:  9:00 AM  END TIME:  9:50 AM  FACILITATOR: Concepcion Carrillo PsyD  TOPIC:  Process Group    Diagnoses:  [facility] [phone], fax: [phone]  -Nicolette Joyce @ [employer] [phone], [phone] , fax: [phone]     Therapist: Jaylin Samson In [place]  PCP:  Dr Lana Faustin @ [facility]    Autism Spectrum Disorder 299.00(F84.0)  Associated with another neurodevelopmental, mental or behavioral disorder, Attention-Deficit/Hyperactivity Disorder  314.01 (F90.9) Unspecified Attention -Deficit / Hyperactivity Disorder and Specific Learning Disorder 315.2 (F81.81) With impairment in written expression grammar and punctuation accuracy  296.43 Bipolar I Disorder Current or Most Recent Episode Manic, Severe   300.00 (F41.9) Unspecified Anxiety Disorder  302.85 (F64.1) Gender dysphoria in Adolescents and Adults  With a disorder of   sex development      Adult Mental Health Day Treatment  TRACK: 2A    NUMBER OF PARTICIPANTS: 4          Data:    Session content: At the start of this group, patients were invited to check in by identifying themselves, describing their current emotional status, and identifying issues to address in this group.   Area(s) of treatment focus addressed in this session included Symptom Management, Personal Safety and Community Resources/Discharge Planning.    Client reported being safe today.  Seema talked to the group about managing anger with texts and had a goal of not sending angry texts.  Seema received feedback about being honest and trying to re-establish a relationship with the running , and listening to the .    Therapeutic Interventions/Treatment Strategies:  Psychotherapist offered support, feedback and validation, provided redirection and reinforced use  "of skills. Treatment modalities used include Cognitive Behavioral Therapy. Interventions include Cognitive Restructuring:  Assisted patient in formulating new neutral/positive alternatives to challenge less helpful / ineffective thoughts, Coping Skills: Discussed how the use of intentional \"in the moment\" actions can help reduce distress and Promoted understanding of how and when to apply grounding strategies to reduce distress and increase presence in the moment and Mindfulness: Facilitated discussion of when/how to use mindfulness skills to benefit general health, mental health symptoms, and stressors.    Assessment:    Patient response:   Patient responded to session by accepting feedback, giving feedback, listening, focusing on goals, being attentive and accepting support    Possible barriers to participation / learning include: severity of symptoms    Health Issues:   None reported       Substance Use Review:   Substance Use: No active concerns identified.    Mental Status/Behavioral Observations  Appearance:   Appropriate   Eye Contact:   Good   Psychomotor Behavior: Some restlessness and leaving the group, but returning  Attitude:   Cooperative   Orientation:   All  Speech   Rate / Production: Normal    Volume:  Normal   Mood:    Anxious   Affect:    Constricted   Thought Content:   Rumination  Thought Form:  Coherent  Logical     Insight:    Good     Plan:     Safety Plan: Recommended that patient call 911 or go to the local ED should there be a change in any of these risk factors.     Barriers to treatment: None identified    Patient Contracts (see media tab):  None    Substance Use: Not addressed in session     Continue or Discharge: Patient will continue in Adult Day Treatment (ADT)  as planned. Patient is likely to benefit from learning and using skills as they work toward the goals identified in their treatment plan.      Patti Alfredo, D,  Licensed Psychologist   January 16, 2020  "

## 2020-01-16 NOTE — GROUP NOTE
Life Skills/OT Group Note    PATIENT'S NAME: Remy Holloway  MRN:   [id number]  :   2000  ACCT. NUMBER: [id number]  DATE OF SERVICE: 20  START TIME: 11:00 AM  END TIME: 11:50 AM  FACILITATOR: Lopez Gray OTR/L  TOPIC:  Life Skills Group: Lifestyle Balance and Structure  Adult Mental Health Day Treatment  TRACK: 2A    NUMBER OF PARTICIPANTS: 4    Summary of Group / Topics Discussed:  Lifestyle Balance and Strucure:  Benefits of Leisure on Mental Health: Patients explored and learned about the benefits and possibilities of leisure activity to create lifestyle balance that supports their mental and physical wellbeing.  Patients were assisted to identify individualized leisure values and interests, recognized the benefits of leisure activity on mental health, and problem solved barriers to leisure engagement and strategies to overcome.  Patient engaged in an experiential leisure activity to gain self-awareness and build milieu social aspects and reflected on the impact the experiential activity had on their mood.       Patient Session Goals / Objectives:    Increased awareness of the importance of engagement in leisure activities to support lifestyle balance and perceived quality of life    Identified strategies to recognize and challenge barriers to leisure participation     Facilitated exploration of meaningful leisure interests and values    Practiced and reflected on how to generalize taught skills to their everyday life        Patient Participation / Response:  Minimally participated, only when prompted / asked.    Patient presentation: Calm,alert with stable mood and thought process.Social with another gorup member.    Treatment Plan:  Patient has a current master individualized treatment plan.  See Epic treatment plan for more information.    NAVEEN Mcdermott/QUAN

## 2020-01-20 ENCOUNTER — HOSPITAL ENCOUNTER (OUTPATIENT)
Dept: BEHAVIORAL HEALTH | Facility: CLINIC | Age: 20
End: 2020-01-20
Attending: PSYCHIATRY & NEUROLOGY
Payer: COMMERCIAL

## 2020-01-20 PROCEDURE — 90853 GROUP PSYCHOTHERAPY: CPT | Performed by: PSYCHOLOGIST

## 2020-01-20 PROCEDURE — G0177 OPPS/PHP; TRAIN & EDUC SERV: HCPCS

## 2020-01-20 NOTE — GROUP NOTE
EBP Group Note    PATIENT'S NAME: Remy Holloway  MRN:   [id number]  :   2000  [id number]  DATE OF SERVICE: 20  START TIME: 11:00 AM  END TIME: 11:50 AM  FACILITATOR: Concepcion Carrillo PsyD  TOPIC:  EBP Group: Self-Awareness  Adult Mental Health Day Treatment  TRACK: 2A    NUMBER OF PARTICIPANTS: 4    Summary of Group / Topics Discussed:  Self-Awareness: Personal Strengths: Topic focused on assisting patients in identifying personal strengths and how they relate to the management of mental health symptoms. Patients discussed the benefits of acknowledging their personal strengths and their impact on mood improvement, mindfulness, and perspective. Patients worked to increase time spent on recognition and appreciation of what is positive and working in their lives. The goal is to reduce rumination and negative thinking resulting in increased mindfulness and resilience. Patients will work to put skills into practice and problem-solve barriers.     Patient Session Goals / Objectives:    Identified personal strengths    Identified barriers to recognition of personal strengths    Verbalized understanding of strategies to increase use of their strengths in management of daily symptoms      Patient Participation / Response:  Fully participated with the group by sharing personal reflections / insights and openly received / provided feedback with other participants.    Demonstrated understanding of values, strengths, and challenges to learn about themselves    Treatment Plan:  Patient has a current master individualized treatment plan.  See Epic treatment plan for more information.    Patti Alfredo, D,  Licensed Psychologist

## 2020-01-20 NOTE — GROUP NOTE
Process Group Note    PATIENT'S NAME: Remy Holloway  MRN:   [id number]  :   2000  ACCT. NUMBER: [id number]  DATE OF SERVICE: 20  START TIME: 10:00  END TIME: 10:50 AM  FACILITATOR: Concepcion Carrillo PsyD  TOPIC:  Process Group    Diagnoses:    296.43 Bipolar I Disorder Current or Most Recent Episode Manic, Severe   300.00 (F41.9) Unspecified Anxiety Disorder  302.85 (F64.1) Gender dysphoria in Adolescents and Adults  With a disorder of   sex development  Psychiatrist Dr Reina Rose MD, [facility]. [phone], fax: [phone]  -Nicolette Joyce @ [employer] [phone], [phone] , fax: [phone]     Therapist: Jaylin Lauren [place]  PCP:  Dr Lana Faustin @ [facility]  Client reported being safe today.  Seema talked to the group about managing anger with texts and had a goal of not sending angry texts.  Seema received feedback about being honest and trying to re-establish a relationship with the running , and listening to the .  Autism Spectrum Disorder 299.00(F84.0)  Associated with another neurodevelopmental, mental or behavioral disorder, Attention-Deficit/Hyperactivity Disorder  314.01 (F90.9) Unspecified Attention -Deficit / Hyperactivity Disorder and Specific Learning Disorder 315.2 (F81.81) With impairment in written expression grammar and punctuation accuracy     Therapist: Jaylin Lauren [place]  PCP:  Dr Lana Faustin @ [facility]      Adult Mental Health Day Treatment  TRACK: 2A    NUMBER OF PARTICIPANTS: 4          Data:    Session content: At the start of this group, patients were invited to check in by identifying themselves, describing their current emotional status, and identifying issues to address in this group.   Area(s) of treatment focus addressed in this session included Symptom Management, Personal Safety and Community Resources/Discharge Planning.    Client reported being safe today.  Seema talked to the group about  managing anger with texts and had a goal of not sending angry texts, and reported that they did not send one for 3 days. The group validated their experience.  Seema received feedback about being honest and trying to re-establish a relationship with the running , and listening to the , and said that they spent a great deal of time with the running .        Therapeutic Interventions/Treatment Strategies:  Psychotherapist offered support, feedback and validation, provided redirection and reinforced use of skills. Treatment modalities used include Cognitive Behavioral Therapy. Interventions include Cognitive Restructuring:  Explored impact of ineffective thoughts / distortions on mood and activity, Coping Skills: Assisted patient in identifying 1-2 healthy distraction skills to reduce overall distress and Mindfulness: Encouraged a plan to use mindfulness skills in daily life.    Assessment:    Patient response:   Patient responded to session by accepting feedback, giving feedback, listening, focusing on goals, being attentive and accepting support    Possible barriers to participation / learning include: severity of symptoms    Health Issues:   None reported       Substance Use Review:   Substance Use: No active concerns identified.    Mental Status/Behavioral Observations  Appearance:   Appropriate   Eye Contact:   Good   Psychomotor Behavior: Normal   Attitude:   Cooperative   Orientation:   All  Speech   Rate / Production: Normal    Volume:  Normal   Mood:    Anxious   Affect:    Appropriate   Thought Content:   Rumination  Thought Form:  Coherent  Logical  Tangential     Insight:    Fair     Plan:     Safety Plan: Recommended that patient call 911 or go to the local ED should there be a change in any of these risk factors.     Barriers to treatment: None identified    Patient Contracts (see media tab):  None    Substance Use: Not addressed in session     Continue or Discharge: Patient will  continue in Adult Day Treatment (ADT)  as planned. Patient is likely to benefit from learning and using skills as they work toward the goals identified in their treatment plan.    Stabilization of symptoms is needed.    Patti Alfredo, D,  Licensed Psychologist   January 20, 2020

## 2020-01-20 NOTE — PROGRESS NOTES
RN Review of Medical History / Physical Health Screen  Outpatient Mental Health Programs - Adult    Adult Mental Health Day Treatment    PATIENT'S NAME: Remy Holloway  MRN:   [id number]  :   2000  ACCT. NUMBER: [id number]  CURRENT AGE:  19 year old    DATE OF DIAGNOSTIC ASSESSMENT: 10/01/2019  DATE OF ADMISSION: 10/03/2019    Please see Diagnostic Assessment for additional Medical History.     General Health:   Have you had any exposure to any communicable disease in the past 2-3 weeks? no     Are you aware of safe sex practices? yes       Nutrition:    Are you on a special diet? If yes, please explain:  no   Do you have any concerns regarding your nutritional status? If yes, please explain:  no   Have you had any appetite changes in the last 3 months?  Yes     Have you had any weight loss or weight gain in the last 3 months?  No     Do you have a history of an eating disorder? no   Do you have a history of being in an eating disorder program? no     Patient height and weight recorded by RN in epic flowsheet: no , patient declined having weight taken      BMI Review:  Was the patient informed of BMI? no ; not applicable/patient declined      Findings not applicable/patient declined          Fall Risk:   Have you had any falls in the past 3 months? yes ; 2 falls     Do you currently use any assistive devices for mobility?   no      Additional Comments/Assessment: None indicated    Per completion of the Medical History / Physical Health Screen, is there a recommendation to see / follow up with a primary care physician/clinic or dentist?    No.      Kezia Cleaning RN  2020

## 2020-01-21 ENCOUNTER — HOSPITAL ENCOUNTER (OUTPATIENT)
Dept: BEHAVIORAL HEALTH | Facility: CLINIC | Age: 20
End: 2020-01-21
Attending: PSYCHIATRY & NEUROLOGY
Payer: COMMERCIAL

## 2020-01-21 PROCEDURE — G0177 OPPS/PHP; TRAIN & EDUC SERV: HCPCS

## 2020-01-21 PROCEDURE — 90853 GROUP PSYCHOTHERAPY: CPT | Performed by: PSYCHOLOGIST

## 2020-01-21 PROCEDURE — G0177 OPPS/PHP; TRAIN & EDUC SERV: HCPCS | Performed by: COUNSELOR

## 2020-01-21 NOTE — GROUP NOTE
Life Skills/OT Group Note    PATIENT'S NAME: Remy Holloway  MRN:   [id number]  :   2000  ACCT. NUMBER: [id number]  DATE OF SERVICE: 20  START TIME:  9:00 AM  END TIME:  9:50 AM  FACILITATOR: Lopez Gray OTR/L  TOPIC:  Life Skills Group: Lifestyle Balance and Structure  Adult Mental Health Day Treatment  TRACK: 2A    NUMBER OF PARTICIPANTS: 6    Summary of Group / Topics Discussed:  Lifestyle Balance and Strucure:  Occupations: A Balanced Lifestyle: Patients were introduced to the importance of daily occupations in support of mental health management by exploring a balanced lifestyle.  Patients identified how they spend their time and skills to bring this into better balance.  Patients were assisted to establish, restore, and/or modify performance skills and patterns for improved engagement and promotion of positive mental health through meaningful occupations.  Patients gained awareness of the connection between who they are (self identity) with what they do.    Patient Session Goals / Objectives:    Increased awareness of how patient s functioning in identified meaningful occupations are impacted by their mental health status     Developed performance skills and performance patterns to enhance occupational engagement through creating a balanced lifestyle    Explored ways to generalize new awareness and skills to their everyday life        Patient Participation / Response:  Fully participated with the group by sharing personal reflections / insights and openly received / provided feedback with other participants.    Patient presentation: Calm,alert,focused with stable mood and thought process.    Treatment Plan:  Patient has a current master individualized treatment plan.  See Epic treatment plan for more information.    NAVEEN Mcdermott/QUAN

## 2020-01-21 NOTE — GROUP NOTE
RN Group Note    PATIENT'S NAME: Remy Holloway  MRN:   [id number]  :   2000  ACCT. NUMBER: [id number]  DATE OF SERVICE: 20  START TIME: 11:00 AM  END TIME: 11:50 AM  FACILITATOR: Rebeca Melendez LPCC  TOPIC:  RN Group: Health Maintenance  Adult Mental Health Day Treatment  TRACK: 2A    NUMBER OF PARTICIPANTS: 5    Summary of Group / Topics Discussed:  Health Maintenance: Goal Setting: Meaningful goals can bring a sense of direction and purpose in life.  They also highlight our most important values. Patients were assisted by instructor to identify short term goals to promote their mental health recovery and improve overall health and wellness. Patients were educated on SMART goal setting framework as a strategy to increase outcomes and promote success.    Patient Session Goals / Objectives:  ? Explained the key concepts of SMART goal setting framework  ? Identified three goals successfully using SMART goal setting framework  ? Reviewed concept of balance in wellness as it pertains to goal setting        Patient Participation / Response:  Fully participated with the group by sharing personal reflections / insights and openly received / provided feedback with other participants.    Demonstrated understanding of topics discussed through group discussion and participation and Identified / Expressed personal readiness to practice skills    Treatment Plan:  Patient has a current master individualized treatment plan.  See Epic treatment plan for more information.    KAILASH Nelson

## 2020-01-23 ENCOUNTER — HOSPITAL ENCOUNTER (OUTPATIENT)
Dept: BEHAVIORAL HEALTH | Facility: CLINIC | Age: 20
End: 2020-01-23
Attending: PSYCHIATRY & NEUROLOGY
Payer: COMMERCIAL

## 2020-01-23 PROCEDURE — G0177 OPPS/PHP; TRAIN & EDUC SERV: HCPCS

## 2020-01-23 PROCEDURE — 90853 GROUP PSYCHOTHERAPY: CPT | Performed by: PSYCHOLOGIST

## 2020-01-23 NOTE — GROUP NOTE
Life Skills/OT Group Note    PATIENT'S NAME: Remy Holloway  MRN:   [id number]  :   2000  ACCT. NUMBER: [id number]  DATE OF SERVICE: 20  START TIME: 10:00 AM  END TIME: 10:50 AM  FACILITATOR: Bryan Gerber OT  TOPIC:  Life Skills Group: Sensory Approaches in Mental Health  Adult Mental Health Day Treatment  TRACK: 2A    NUMBER OF PARTICIPANTS: 3    Summary of Group / Topics Discussed:  Sensory Approaches in Mental Health:  Focused Activity: Patients were provided with verbal and experiential education to identify physical and sensorimotor based activities that can be utilized for stress management, self care, health maintenance, and self regulation.  Patients increased knowledge and awareness of activities that support good self care, build resiliency, and prevent relapse through healthy engagement in mindful focused activities for effective coping with illness and reduction of maladaptive coping skills. They were provided opportunity to engage in a group focused activity to monitor themselves and reflect on how the experiential process helped their mood and their anxiety level.     Patient Session Goals / Objectives:    Identified specific physical and sensorimotor based activities for stress management, self care, health maintenance, and self regulation      Improved awareness of activities that are meaningful focused activities that support good self care, build resiliency, and prevent relapse and how this applies to current daily life    Established a plan for practice of these skills in their own environments    Practiced and reflected on how to generalize taught skills to their everyday life      Patient Participation / Response:  Fully participated with the group by sharing personal reflections / insights and openly received / provided feedback with other participants.    Patient presentation: Improved focus, effort, and social skills, tending to needs of peers at times. Able to identify  meaningful focused activties that help them stay grounded. , Verbalized understanding of content and Patient would benefit from additional opportunities to practice the content to be able to generalize it to their everyday life with increased intentionality, consistency, and efficacy in support of their psychiatric recovery    Treatment Plan:  Patient has a current master individualized treatment plan.  See Epic treatment plan for more information.    Bryan Gerber, OT

## 2020-01-23 NOTE — GROUP NOTE
Life Skills/OT Group Note    PATIENT'S NAME: Remy Holloway  MRN:   [id number]  :   2000  [id number]  DATE OF SERVICE: 20  START TIME: 11:00 AM  END TIME: 11:50 AM  FACILITATOR: Lopez Gray OTR/L  TOPIC:  Life Skills Group: Lifestyle Balance and Structure  Adult Mental Health Day Treatment  TRACK: 2A    NUMBER OF PARTICIPANTS: 3    Summary of Group / Topics Discussed:  Lifestyle Balance and Strucure:  Time Management: Time for Tips and Tips for Time: Patients were introduced to how effective time management is beneficial to self esteem, relationships with others, life balance, and other aspects of daily life.   Patients were taught strategies on how to improve time management skills.    Patient Session Goals / Objectives:    Facilitated the discussion on challenges/barriers and personal situations with time management     Identified specific techniques and strategies to improve time management to support mental health recovery       Identified a plan to implement strategies into daily life to support improved time management         Patient Participation / Response:  Fully participated with the group by sharing personal reflections / insights and openly received / provided feedback with other participants.    Patient presentation: Calm,alert,focused with stable mood and thought process. Patient was given information related to Community Support programs particulaly [facility].    Treatment Plan:  Patient has a current master individualized treatment plan.  See Epic treatment plan for more information.    NAVEEN Mcdermott/QUAN

## 2020-01-23 NOTE — GROUP NOTE
Process Group Note    PATIENT'S NAME: Remy Holloway  MRN:   [id number]  [facility]   2000  [id number]  DATE OF SERVICE: 20  START TIME:  9:00 AM  END TIME:  9:50 AM  FACILITATOR:Autism Spectrum Disorder 299.00(F84.0)  Associated with another neurodevelopmental, mental or behavioral disorder, Attention-Deficit/Hyperactivity Disorder  314.01 (F90.9) Unspecified Attention -Deficit / Hyperactivity Disorder and Specific Learning Disorder 315.2 (F81.81) With impairment in written expression grammar and punctuation accuracy  296.43 Bipolar I Disorder Current or Most Recent Episode Manic, Severe   300.00 (F41.9) Unspecified Anxiety Disorder  302.85 (F64.1) Gender dysphoria in Adolescents and Adults  With a disorder of   sex development   Concepcion Carrillo, [place]  TOPIC:  Process Group    Diagnoses:      Adult Mental Health Day Treatment  TRACK: 2A    NUMBER OF PARTICIPANTS: 4          Data:    Session content: At the start of this group, patients were invited to check in by identifying themselves, describing their current emotional status, and identifying issues to address in this group.   Area(s) of treatment focus addressed in this session included Symptom Management, Personal Safety and Community Resources/Discharge Planning.    Client reported being safe today.  Seema talked to the group about managing anger with texts and had a goal of not sending angry texts, and reported that they sent a positive text and was proud of that.  Seema talked to the group about suicidal ideation and feelings of being overwhelmed and some depression.  The group helped Seema problem-solve and write affirmations for this.  Seema received feedback about being honest and trying to re-establish a relationship with the running , and listening to the .      Therapeutic Interventions/Treatment Strategies:  Psychotherapist offered support, feedback and validation and reinforced use of skills. Treatment modalities  used include Cognitive Behavioral Therapy. Interventions include Cognitive Restructuring:  Explored impact of ineffective thoughts / distortions on mood and activity, Coping Skills: Assisted patient in identifying 1-2 healthy distraction skills to reduce overall distress, Symptoms Management: Promoted understanding of their diagnoses and how it impacts their functioning and Emotions Management:  Discussed barriers to emotional regulation.    Assessment:    Patient response:   Patient responded to session by accepting feedback, giving feedback, listening, focusing on goals, being attentive and accepting support    Possible barriers to participation / learning include: severity of symptoms    Health Issues:   None reported       Substance Use Review:   Substance Use: No active concerns identified.    Mental Status/Behavioral Observations  Appearance:   Appropriate   Eye Contact:   Good   Psychomotor Behavior: Normal   Attitude:   Cooperative   Orientation:   All  Speech   Rate / Production: Normal    Volume:  Normal   Mood:    Normal  Affect:    Appropriate   Thought Content:   Rumination  Thought Form:  Coherent  Logical  Tangential     Insight:    Good     Plan:     Safety Plan: Recommended that patient call 911 or go to the local ED should there be a change in any of these risk factors.     Barriers to treatment: None identified    Patient Contracts (see media tab):  None    Substance Use: Not addressed in session     Continue or Discharge: Patient will continue in Adult Day Treatment (ADT)  as planned. Patient is likely to benefit from learning and using skills as they work toward the goals identified in their treatment plan.    Yoana Alfredo., D,  Licensed Psychologist    January 23, 2020

## 2020-01-27 ENCOUNTER — HOSPITAL ENCOUNTER (OUTPATIENT)
Dept: BEHAVIORAL HEALTH | Facility: CLINIC | Age: 20
End: 2020-01-27
Attending: PSYCHIATRY & NEUROLOGY
Payer: COMMERCIAL

## 2020-01-27 PROCEDURE — G0177 OPPS/PHP; TRAIN & EDUC SERV: HCPCS

## 2020-01-27 PROCEDURE — 90853 GROUP PSYCHOTHERAPY: CPT | Performed by: PSYCHOLOGIST

## 2020-01-27 NOTE — GROUP NOTE
Process Group Note    PATIENT'S NAME: Remy Holloway  MRN:   [id number]  [facility]   2000  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   Area(s) of treatment focus addressed in this session included Symptom Management, Personal Safety and Community Resources/Discharge Planning.    Seema reported being safe today. They reported instances where there were angry exchanges and talked to the group about how the anger was expressed.  Seema reported communicating with therapist, Jaylin and others in a calmer manner.  They discussed how they could use alternatives when feeling angry. They discussed future plans for travel.  They reported feeling better this week and not having as much depression symptoms.     Therapeutic Interventions/Treatment Strategies:  Psychotherapist offered support, feedback and validation and reinforced use of skills. Treatment modalities used include Cognitive Behavioral Therapy and Dialectical Behavioral Therapy. Interventions include  Cognitive Restructuring:  Explored impact of ineffective thoughts / distortions on mood and activity, Coping Skills: Assisted patient in identifying 1-2 healthy distraction skills to reduce overall distress and Mindfulness: Encouraged a plan to use mindfulness skills in daily life.    Assessment:    Patient response:   Patient responded to session by accepting feedback, giving feedback, listening, focusing on goals, being attentive and accepting support    Possible barriers to participation / learning include: severity of symptoms    Health Issues:   None reported       Substance Use Review:   Substance Use: No active concerns identified.    Mental Status/Behavioral Observations  Appearance:   Appropriate   Eye Contact:   Good   Psychomotor Behavior: Normal   Attitude:   Cooperative   Orientation:   All  Speech   Rate / Production: Normal    Volume:  Normal   Mood:    Anxious  Normal  Affect:    Constricted   Thought Content:   Rumination  Thought Form:  Coherent  Logical  Tangential     Insight:    Fair     Plan:     Safety Plan: Recommended that patient call 911 or go to the local ED should there be a change in any of these risk factors.     Barriers to treatment: None identified    Patient Contracts (see media tab):  None    Substance Use: Not addressed in session     Continue or Discharge: Patient will continue in Adult Day Treatment (ADT)  as planned. Patient is likely to benefit from learning and using skills as they work toward the goals identified in their treatment plan.     Stabilization of symptoms is needed.        Concepcion Carrillo PsyD  January 27, 2020

## 2020-01-27 NOTE — GROUP NOTE
RN Group Note    PATIENT'S NAME: Remy Holloway  MRN:   [id number]  :   2000  ACCT. NUMBER: [id number]  DATE OF SERVICE: 20  START TIME:  9:00 AM  END TIME:  9:50 AM  FACILITATOR: Ruthann Jalloh RN  TOPIC:  RN Group: [facility]  Adult Mental Health Day Treatment  TRACK: 2A    NUMBER OF PARTICIPANTS: 5    Summary of Group / Topics Discussed:  Foundations of Health: Nutrition: Design a Meal: Patients were educated on the USDA guidelines for creating meals that meet daily nutritional requirements. With the assistance of the instructor, patients were guided to use these to design well-balanced meals. Barriers and obstacles to meeting daily nutritional needs were discussed in the group and solutions and strategies were explored. Patients were also provided education and resources on healthy snack options, budget saving tips, and safe food handling & preparation.      Patient Session Goals / Objectives:  ? Applied USDA MyPlate guidelines to design a balanced breakfast, lunch, and dinner  ? Verbalized healthy snack options   ? Identified personal barriers/obstacles to meeting nutritional needs (if present) and listed strategies that could be used to overcome them        Patient Participation / Response:  Fully participated with the group by sharing personal reflections / insights and openly received / provided feedback with other participants.    Identified / Expressed personal readiness to practice skills and Verbalized understanding of foundations of health topic    Treatment Plan:  Patient has a current master individualized treatment plan.  See Epic treatment plan for more information.    Ruthann Jalloh RN

## 2020-01-27 NOTE — GROUP NOTE
EBP Group Note    PATIENT'S NAME: Remy Holloway  MRN:   [id number]  [facility]   2000  [id number]  DATE OF SERVICE: 20  START TIME: 11:00 AM  END TIME: 11:50 AM  FACILITATOR: Concepcion Carrillo PsyD  TOPIC:  EBP Group: Emotions Management  Adult Mental Health Day Treatment  TRACK: 2A    NUMBER OF PARTICIPANTS: 5    Summary of Group / Topics Discussed:  Emotions Management: Anger: Patients explored and shared personal experiences associated with feelings of anger.  Group explored how these feelings develop, what they mean to each individual, and how to increase acceptance and usefulness of these feelings.  Discussed anger as a  secondary  emotion and reviewed ways to manage anger and challenge associated cognitive distortions. Group members worked to contextualize these concepts and promote healing.     Patient Session Goals / Objectives:    Discuss and review definitions and personal views/experiences with anger    Explore how feelings of anger impact functioning    Understand and practice strategies to manage difficult emotions and move towards healing    Demonstrate understanding of the feelings of anger    Verbalize how these emotions have impacted their lives/functioning    Verbalize of knowledge gained and possible interventions to manage feelings      Patient Participation / Response:  Fully participated with the group by sharing personal reflections / insights and openly received / provided feedback with other participants.    Demonstrated understanding of topics discussed through group discussion and participation and Expressed understanding of the relevance / importance of emotions management skills at distressing times in life    Treatment Plan:  Patient has a current master individualized treatment plan.  See Epic treatment plan for more information.    Patti Alfredo, D,  Licensed Psychologist     PATIENT SITTING IN BED, NO COMPLAINTS, MOTHER AT BEDSIDE. D5 1/2NS INFUSING
@50mL/HR. HE HAS BEEN AFEBRILE THROUGH THE NIGHT. RIGHT LOWER LEG LOOKS
IMPROVED, LARGE BLISTERS NOTED ON THE BACK SIDE OF THE LEG WITH NO REPORTS
THAT ANY HAS BURST. BED IN LOWEST LOCKED POSITION, HOB ELEVATED,x1 BEDRAIL UP,
CALL LIGHT WITHIN REACH.

## 2020-01-27 NOTE — ADDENDUM NOTE
Encounter addended by: Concepcion Carrillo PsyD on: 1/27/2020 1:41 PM   Actions taken: Order Reconciliation Section accessed

## 2020-01-28 ENCOUNTER — HOSPITAL ENCOUNTER (OUTPATIENT)
Dept: BEHAVIORAL HEALTH | Facility: CLINIC | Age: 20
End: 2020-01-28
Attending: PSYCHIATRY & NEUROLOGY
Payer: COMMERCIAL

## 2020-01-28 PROCEDURE — G0177 OPPS/PHP; TRAIN & EDUC SERV: HCPCS | Performed by: COUNSELOR

## 2020-01-28 PROCEDURE — 90853 GROUP PSYCHOTHERAPY: CPT | Performed by: PSYCHOLOGIST

## 2020-01-28 PROCEDURE — G0177 OPPS/PHP; TRAIN & EDUC SERV: HCPCS

## 2020-01-28 NOTE — GROUP NOTE
Process Group Note    PATIENT'S NAME: Remy Holloway  MRN:   [id number]  :   2000  ACCT. NUMBER: [id number]  DATE OF SERVICE: 20  START TIME: 10:00 AM  END TIME: 10:50 AM  FACILITATOR: Concepcion Carrillo PsyD  TOPIC:  Process Group    Diagnoses:Autism Spectrum Disorder 299.00(F84.0)  Associated with another neurodevelopmental, mental or behavioral disorder, Attention-Deficit/Hyperactivity Disorder  314.01 (F90.9) Unspecified Attention -Deficit / Hyperactivity Disorder and Specific Learning Disorder 315.2 (F81.81) With impairment in written expression grammar and punctuation accuracy  296.43 Bipolar I Disorder Current or Most Recent Episode Manic, Severe   300.00 (F41.9) Unspecified Anxiety Disorder  302.85 (F64.1) Gender dysphoria in Adolescents and Adults  With a disorder of   sex development      Psychiatrist Dr Reina Rose MD, [facility]. [phone], fax: [phone]  -Nicolette Cook @ [employer] [phone], x [phone] , fax: [phone]     Therapist: Jaylin Samson In [place]  PCP:  Dr Lana Faustin @ [facility]      Adult Mental Health Day Treatment  TRACK: 2A    NUMBER OF PARTICIPANTS: 5          Data:    Session content: At the start of this group, patients were invited to check in by identifying themselves, describing their current emotional status, and identifying issues to address in this group.   Area(s) of treatment focus addressed in this session included Symptom Management, Personal Safety and Community Resources/Discharge Planning.    Client reported being safe today.  Seema talked to the group about managing anger with texts and had a goal of not sending angry texts, and did not send one this morning. Seema talked about being frustrated at cousins and yelling out the door about it.  The group validated the frustration and talked about ways to express it.  Seema received feedback about being honest and trying to re-establish a relationship with the running  , and listening to the .       Therapeutic Interventions/Treatment Strategies:  Psychotherapist offered support, feedback and validation, provided redirection and reinforced use of skills. Treatment modalities used include Cognitive Behavioral Therapy and Dialectical Behavioral Therapy. Interventions include Cognitive Restructuring:  Explored impact of ineffective thoughts / distortions on mood and activity, Coping Skills: Assisted patient in identifying 1-2 healthy distraction skills to reduce overall distress, Mindfulness: Facilitated discussion of when/how to use mindfulness skills to benefit general health, mental health symptoms, and stressors and Emotions Management:  Increased awareness of daily mood patterns/changes.    Assessment:    Patient response:   Patient responded to session by accepting feedback, giving feedback, listening, focusing on goals, being attentive, accepting support and appearing alert    Possible barriers to participation / learning include: severity of symptoms    Health Issues:   None reported       Substance Use Review:   Substance Use: No active concerns identified.    Mental Status/Behavioral Observations  Appearance:   Appropriate   Eye Contact:   Good   Psychomotor Behavior: Normal   Attitude:   Cooperative   Orientation:   All  Speech   Rate / Production: Normal    Volume:  Normal   Mood:    Anxious   Affect:    Constricted   Thought Content:   Rumination  Thought Form:  Coherent  Logical  Tangential     Insight:    Fair     Plan:     Safety Plan: Recommended that patient call 911 or go to the local ED should there be a change in any of these risk factors.     Barriers to treatment: None identified    Patient Contracts (see media tab):  None    Substance Use: Not addressed in session     Continue or Discharge: Patient will continue in Adult Day Treatment (ADT)  as planned. Patient is likely to benefit from learning and using skills as they work toward the goals  identified in their treatment plan.  Stabilization of symptoms is needed.      Yoana Alfredo., D,  Licensed Psychologist   January 28, 2020

## 2020-01-28 NOTE — GROUP NOTE
Life Skills/OT Group Note    PATIENT'S NAME: Remy Holloway  MRN:   [id number]  :   2000  ACCT. NUMBER: [id number]  DATE OF SERVICE: 20  START TIME:  9:00 AM  END TIME:  9:50 AM  FACILITATOR: Lopez Gray OTR/L  TOPIC:  Life Skills Group: Lifestyle Balance and Structure  Adult Mental Health Day Treatment  TRACK: 2A    NUMBER OF PARTICIPANTS: 5    Summary of Group / Topics Discussed:  Lifestyle Balance and Strucure:  Money Management: Patients were assisted to identify meaningful roles that they want to promote and the impact their mental health symptoms have on this.  Patients learned, applied, and generalized skills needed to live and participate in meaningful roles as effectively and independently as possible.  Patients developed awareness of strengths and challenges in fulfilling these roles and worked on integrating specific and individualized rituals and habits into their daily life.     Patient Session Goals / Objectives:    Improved awareness and engagement in life s meaningful roles, routines, habits, and rituals    Explored and identified current roles and challenges due to mental health symptoms     Identified ways to establish and integrate daily self care and wellness routines and habits to support mental health recovery    Practiced and reflected on how to generalize taught skills to their everyday life     Learn money management skills and coping skills to manage financial stress      Patient Participation / Response:  Minimally participated, only when prompted / asked.    Patient presentation: Calm,alert,focused with stable mood and thought process.    Treatment Plan:  Patient has a current master individualized treatment plan.  See Epic treatment plan for more information.    NAVEEN Mcdermott/QUAN

## 2020-01-28 NOTE — GROUP NOTE
RN Group Note    PATIENT'S NAME: Remy Holloway  MRN:   [id number]  :   2000  ACCT. NUMBER: [id number]  DATE OF SERVICE: 20  START TIME: 11:00 AM  END TIME: 11:50 AM  FACILITATOR: Rebeca Melendez LPCC  TOPIC:  RN Group: Health Maintenance  Adult Mental Health Day Treatment  TRACK: 2a    NUMBER OF PARTICIPANTS: 5    Summary of Group / Topics Discussed:  Health Maintenance: Goal Setting: Meaningful goals can bring a sense of direction and purpose in life.  They also highlight our most important values. Patients were assisted by instructor to identify short term goals to promote their mental health recovery and improve overall health and wellness. Patients were educated on SMART goal setting framework as a strategy to increase outcomes and promote success.    Patient Session Goals / Objectives:  ? Explained the key concepts of SMART goal setting framework  ? Identified three goals successfully using SMART goal setting framework  ? Reviewed concept of balance in wellness as it pertains to goal setting        Patient Participation / Response:  Fully participated with the group by sharing personal reflections / insights and openly received / provided feedback with other participants.    Demonstrated understanding of topics discussed through group discussion and participation and Identified / Expressed personal readiness to practice skills    Treatment Plan:  Patient has a current master individualized treatment plan.  See Epic treatment plan for more information.    KAILASH Nelson

## 2020-01-30 ENCOUNTER — HOSPITAL ENCOUNTER (OUTPATIENT)
Dept: BEHAVIORAL HEALTH | Facility: CLINIC | Age: 20
End: 2020-01-30
Attending: PSYCHIATRY & NEUROLOGY
Payer: COMMERCIAL

## 2020-01-30 PROCEDURE — G0177 OPPS/PHP; TRAIN & EDUC SERV: HCPCS | Performed by: PSYCHIATRY & NEUROLOGY

## 2020-01-30 PROCEDURE — G0177 OPPS/PHP; TRAIN & EDUC SERV: HCPCS

## 2020-01-30 PROCEDURE — 90853 GROUP PSYCHOTHERAPY: CPT

## 2020-01-30 NOTE — GROUP NOTE
Life Skills/OT Group Note    PATIENT'S NAME: Remy Holloway  MRN:   [id number]  [facility]   2000  [id number]  DATE OF SERVICE: 20  START TIME: 11:00 AM  END TIME: 11:50 AM  FACILITATOR: Lopez Gray OTR/L  TOPIC:  Life Skills Group: Resiliency Development  Adult Mental Health Day Treatment  TRACK: 2A    NUMBER OF PARTICIPANTS: 3    Summary of Group / Topics Discussed:  Resiliency Development: Stress Management: Patients were taught how to identify stressors, signs of stress, coping skills, and prevention strategies for overall stress management.  Patients were given the opportunity to identify both ongoing and acute mental health symptoms and how to effectively manage these symptoms by developing an effective aftercare plan.  Patients increased awareness of community based resources.    Patient Session Goals / Objectives:    Identified how using coping skills can be used for symptom and stress management       Improved awareness of individualed symptoms and stressors and how to effectively cope     Established a relapse prevention plan to practice these skills in their own environments    Practiced and reflected on how to generalize taught skills to their everyday life          Patient Participation / Response:  Moderately participated, sharing some personal reflections / insights and adequately adequately received / provided feedback with other participants.    Patient presentation: Calma,lert and focused with stable mood and thought process.    Treatment Plan:  Patient has a current master individualized treatment plan.  See Epic treatment plan for more information.    NAVEEN Mcdermott/QUAN

## 2020-01-30 NOTE — GROUP NOTE
Process Group Note    PATIENT'S NAME: Remy Holloway  MRN:   [id number]  :   2000  [id number]  DATE OF SERVICE: 20  START TIME:  9:59 AM  END TIME: 10:00 AM  FACILITATOR: Zoie Raza LICSW  TOPIC:  Process Group    Diagnoses:  Autism Spectrum Disorder 299.00(F84.0)  Associated with another neurodevelopmental, mental or behavioral disorder, Attention-Deficit/Hyperactivity Disorder  314.01 (F90.9) Unspecified Attention -Deficit / Hyperactivity Disorder and Specific Learning Disorder 315.2 (F81.81) With impairment in written expression grammar and punctuation accuracy  296.43 Bipolar I Disorder Current or Most Recent Episode Manic, Severe   300.00 (F41.9) Unspecified Anxiety Disorder  302.85 (F64.1) Gender dysphoria in Adolescents and Adults  With a disorder of   sex development    Adult Mental Health Day Treatment  TRACK: 2A    NUMBER OF PARTICIPANTS: 4          Data:    Session content: At the start of this group, patients were invited to check in by identifying themselves, describing their current emotional status, and identifying issues to address in this group.   Area(s) of treatment focus addressed in this session included Symptom Management, Personal Safety, Develop / Improve Independent Living Skills and Develop Socialization / Interpersonal Relationship Skills.    Pt has had an increase in paranoia symptoms. She also described low energy, depressed mood, and increased anxiety.  She endorses SI but indicated no plan. She stated her mother pointed out she is more depressed the last couple of weeks and thinks the weather (cloudiness) is a factor.  She talked about skills to reality check and received feedback from the group.  She doesn't have plans for the weekend and so discussed developing one for structure.     Therapeutic Interventions/Treatment Strategies:  Psychotherapist offered support, feedback and validation and reinforced use of skills. Treatment modalities used include  Motivational Interviewing and Cognitive Behavioral Therapy. Interventions include Coping Skills: Discussed use of self-soothe skills to decrease distress in the body, Discussed meditation skills and addressed ways to implement meditation skills  and Addressed barriers to utilizing coping skills when in distress.    Assessment:    Patient response:   Patient responded to session by accepting feedback, giving feedback, listening, focusing on goals, being attentive and verbalizing understanding    Possible barriers to participation / learning include: distracted by Pt tends to get distracted during group    Health Issues:   None reported       Substance Use Review:   Substance Use: No active concerns identified.    Mental Status/Behavioral Observations  Appearance:   Appropriate   Eye Contact:   Fair   Psychomotor Behavior: Normal   Attitude:   Cooperative   Orientation:   All  Speech   Rate / Production: Normal  Slow    Volume:  Soft   Mood:    Anxious   Affect:    Blunted   Thought Content:   Rumination, Psychosis reports paranoia and preservative thoughts and Safety reports  presence of suicidal ideation passive suicidal ideation   Thought Form:  Tangential     Insight:    Fair     Plan:     Safety Plan: No current safety concerns identified.  Recommended that patient call 911 or go to the local ED should there be a change in any of these risk factors.     Barriers to treatment: None identified    Patient Contracts (see media tab):  None    Substance Use: Not addressed in session     Continue or Discharge: Patient will continue in Adult Day Treatment (ADT)  as planned. Patient is likely to benefit from learning and using skills as they work toward the goals identified in their treatment plan.      Zoie Raza, [facility] HospitalSW  January 30, 2020

## 2020-01-30 NOTE — GROUP NOTE
Life Skills/OT Group Note    PATIENT'S NAME: Remy Holloway  MRN:   [id number]  :   2000  ACCT. NUMBER: [id number]  DATE OF SERVICE: 20  START TIME: 10:00 AM  END TIME: 10:50 AM  FACILITATOR: Bryan Gerber OT  TOPIC: [facility] OT Group: Self- Regulation Skills  Adult Mental Health Day Treatment  TRACK: 2A    NUMBER OF PARTICIPANTS: 4    Summary of Group / Topics Discussed:  Self-Regulation Skills: Sensory Modulation: Patients were provided with education on grounding, highlighting body based sensory input that can be helpful. Reviewed and engaged in an experiential opportunity to get in touch with proprioceptive sense. Patients identified how they use movement and proprioception on a daily basis.  Patient's explored body based skills (bottom up processing skills) and techniques to help bring proprioceptive awareness to the forefront for grounding including stretching, mindful walking, and pushing and pulling to manage symptoms and change level of arousal when needed to be in control and comfortable so they are able to function in different environments.         Patient Session Goals / Objectives:    [place] how proprioception works and importance of its  impact on functioning and mental wellbeing    [place] how developing sensory awareness can help one ground themselves when needed.     Identified signs and symptoms of current level of arousal and ways to make changes in level of arousal when needed    Identified specific and individualized neurosensory skills to help when distressed and for crisis management    Established a plan for practice of these skills in their own environments        Patient Participation / Response:  Fully participated with the group by sharing personal reflections / insights and openly received / provided feedback with other participants.    Patient presentation: Good effort and consistent engagement today. Shares easily and is helpful to peers. Makes effort to connect with  new group member. , Verbalized understanding of content and Patient would benefit from additional opportunities to practice the content to be able to generalize it to their everyday life with increased intentionality, consistency, and efficacy in support of their psychiatric recovery    Treatment Plan:  Patient has a current master individualized treatment plan.  See Epic treatment plan for more information.    Bryan Gerber, OT

## 2020-02-03 ENCOUNTER — HOSPITAL ENCOUNTER (OUTPATIENT)
Dept: BEHAVIORAL HEALTH | Facility: CLINIC | Age: 20
End: 2020-02-03
Attending: PSYCHIATRY & NEUROLOGY
Payer: COMMERCIAL

## 2020-02-03 PROCEDURE — 90853 GROUP PSYCHOTHERAPY: CPT | Performed by: PSYCHOLOGIST

## 2020-02-03 PROCEDURE — G0177 OPPS/PHP; TRAIN & EDUC SERV: HCPCS

## 2020-02-03 PROCEDURE — 90853 GROUP PSYCHOTHERAPY: CPT | Performed by: COUNSELOR

## 2020-02-03 NOTE — GROUP NOTE
RN Group Note    PATIENT'S NAME: Remy Holloway  MRN:   [id number]  :   2000  [id number]  DATE OF SERVICE: 20  START TIME:  9:00 AM  END TIME:  9:50 AM  FACILITATOR: Kezia Cleaning RN  TOPIC:  RN Group: Mind/Body Practice & Complementary  Adult Mental Health Day Treatment  TRACK: 2A    NUMBER OF PARTICIPANTS: 6    Summary of Group / Topics Discussed:  Mind/Body Practice & Complementary Therapies:  Progressive Muscle Relaxation: In addition to affecting our mood and behavior, psychological stress can cause a myriad of physical symptoms in our body. Patients were educated on these effects and guided to increased self-awareness of how stress affects their body. The purpose, benefits, history, and techniques of progressive muscle relaxation were discussed. In an instructor guided experiential, patients were guided to practice PMR to help reduce physical symptoms of psychological stress and achieve a more balanced feeling of well-being.    Patient Session Goals / Objectives:  ? Identified physiological symptoms of stress on the body  ? Listed & Explained the purpose and benefits to practicing PMR   ? Practiced progressive muscle relaxation experiential      Patient Participation / Response:  Fully participated with the group by sharing personal reflections / insights and openly received / provided feedback with other participants.    Demonstrated understanding of topics discussed through group discussion and participation, Identified / Expressed personal readiness to practice skills and Verbalized understanding of Mind/Body Practice & Complementary Therapies topic    Treatment Plan:  Patient has a current master individualized treatment plan.  See Epic treatment plan for more information.    Kezia Cleaning RN

## 2020-02-03 NOTE — GROUP NOTE
EBP Group Note    PATIENT'S NAME: Remy Holloway  MRN:   [id number]  :   2000  ACCT. NUMBER: [id number]  DATE OF SERVICE: 20  START TIME: 11:00 AM  END TIME: 11:50 AM  FACILITATOR: Concepcion Carrillo PsyD  TOPIC:  EBP Group: Cognitive Restructuring  Adult Mental Health Day Treatment  TRACK: 2A    NUMBER OF PARTICIPANTS: 3    Summary of Group / Topics Discussed:  Cognitive Restructuring: Distortions: Patients received an overview of how to identify common cognitive distortions. Patients will explore alternatives to cognitive distortions and practice challenging their negative thought patterns. The goal is to help patients target modify ineffective thought patterns.     Patient Session Goals / Objectives:    Familiarized self with ineffective / unhealthy thoughts and how they develop.      Explored impact of ineffective thoughts / distortions on mood and activity    Formulated new neutral/positive alternatives to challenge less helpful / ineffective thoughts.    Practiced and plan to apply in daily life               Patient Participation / Response:  Fully participated with the group by sharing personal reflections / insights and openly received / provided feedback with other participants.    Expressed understanding of the relationship between behaviors, thoughts, and feelings and Demonstrated knowledge of personal thought patterns and how they impact their mood and behavior.    Treatment Plan:  Patient has a current master individualized treatment plan.  See Epic treatment plan for more information.    Patti Alfredo, D,  Licensed Psychologist

## 2020-02-04 ENCOUNTER — HOSPITAL ENCOUNTER (OUTPATIENT)
Dept: BEHAVIORAL HEALTH | Facility: CLINIC | Age: 20
End: 2020-02-04
Attending: PSYCHIATRY & NEUROLOGY
Payer: COMMERCIAL

## 2020-02-04 PROCEDURE — 90853 GROUP PSYCHOTHERAPY: CPT | Performed by: COUNSELOR

## 2020-02-04 PROCEDURE — G0177 OPPS/PHP; TRAIN & EDUC SERV: HCPCS

## 2020-02-04 NOTE — GROUP NOTE
RN Group Note    PATIENT'S NAME: Remy Holloway  MRN:   [id number]  :   2000  ACCT. NUMBER: [id number]  DATE OF SERVICE: 20  START TIME: 11:00 AM  END TIME: 11:50 AM  FACILITATOR: Eulalia Warren  TOPIC:  RN Group: Health Maintenance  Adult Mental Health Day Treatment  TRACK: 2A    NUMBER OF PARTICIPANTS: 4    Summary of Group / Topics Discussed:  Health Maintenance: Goal Setting: Meaningful goals can bring a sense of direction and purpose in life.  They also highlight our most important values. Patients were assisted by instructor to identify short term goals to promote their mental health recovery and improve overall health and wellness. Patients were educated on SMART goal setting framework as a strategy to increase outcomes and promote success.    Patient Session Goals / Objectives:  ? Explained the key concepts of SMART goal setting framework  ? Identified three goals successfully using SMART goal setting framework  ? Reviewed concept of balance in wellness as it pertains to goal setting        Patient Participation / Response:  Fully participated with the group by sharing personal reflections / insights and openly received / provided feedback with other participants.    Demonstrated understanding of topics discussed through group discussion and participation, Identified / Expressed personal readiness to practice skills and Verbalized understanding of health maintenance topic    Treatment Plan:  Patient has a current master individualized treatment plan.  See Epic treatment plan for more information.    Eulalia Warren

## 2020-02-04 NOTE — GROUP NOTE
"Process Group Note    PATIENT'S NAME: Remy Holloway  MRN:   [id number]  :   2000  ACCT. NUMBER: [id number]  DATE OF SERVICE: 20  START TIME: 10:00 AM  END TIME: 10:50 AM  FACILITATOR: Seema Morales LPCC  TOPIC:  Process Group    Diagnoses:  Autism Spectrum Disorder 299.00(F84.0)  Associated with another neurodevelopmental, mental or behavioral disorder, Attention-Deficit/Hyperactivity Disorder  314.01 (F90.9) Unspecified Attention -Deficit / Hyperactivity Disorder and Specific Learning Disorder 315.2 (F81.81) With impairment in written expression grammar and punctuation accuracy  296.43 Bipolar I Disorder Current or Most Recent Episode Manic, Severe   300.00 (F41.9) Unspecified Anxiety Disorder  302.85 (F64.1) Gender dysphoria in Adolescents and Adults  With a disorder of   sex development      Adult Mental Health Day Treatment  TRACK: 2A    NUMBER OF PARTICIPANTS: 6          Data:    Session content: At the start of this group, patients were invited to check in by identifying themselves, describing their current emotional status, and identifying issues to address in this group.   Area(s) of treatment focus addressed in this session included Symptom Management, Personal Safety and Community Resources/Discharge Planning.  Patient reported feeling \"really good\" after a \"good day\" yesterday; patient explained that sugar really affects him and he was \"off sugar\" yesterday which helped him have a good day with his running . 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 Patient reported he went for a run/walk yesterday. Patient reported he is seeing Nicolette today and goal planned to talk about the [facility].      Therapeutic Interventions/Treatment Strategies:  Psychotherapist offered support, feedback and validation and reinforced use of skills. Treatment modalities used include Motivational Interviewing and " Cognitive Behavioral Therapy. Interventions include Mindfulness: Facilitated discussion of when/how to use mindfulness skills to benefit general health, mental health symptoms, and stressors and Encouraged a plan to use mindfulness skills in daily life and Symptoms Management: Promoted understanding of their diagnoses and how it impacts their functioning.    Assessment:    Patient response:   Patient responded to session by accepting feedback, giving feedback, listening and appearing distracted    Possible barriers to participation / learning include: and no barriers identified    Health Issues:   None reported       Substance Use Review:   Substance Use: No active concerns identified.    Mental Status/Behavioral Observations  Appearance:   Appropriate   Eye Contact:   Fair   Psychomotor Behavior: Restless   Attitude:   Cooperative   Orientation:   All  Speech   Rate / Production: Normal    Volume:  Normal   Mood:    Normal  Affect:    Appropriate   Thought Content:   Clear and Rumination  Thought Form:  Coherent  Logical     Insight:    Good     Plan:     Safety Plan: No current safety concerns identified.  Recommended that patient call 911 or go to the local ED should there be a change in any of these risk factors.     Barriers to treatment: None identified    Patient Contracts (see media tab):  None    Substance Use: Not addressed in session     Continue or Discharge: Patient will continue in Adult Day Treatment (ADT)  as planned. Patient is likely to benefit from learning and using skills as they work toward the goals identified in their treatment plan.      Seema Morales, [facility]  February 4, 2020

## 2020-02-04 NOTE — GROUP NOTE
Life Skills/OT Group Note    PATIENT'S NAME: Remy Holloway  MRN:   [id number]  :   2000  ACCT. NUMBER: [id number]  DATE OF SERVICE: 20  START TIME:  9:00 AM  END TIME:  9:50 AM  FACILITATOR: Lopez Gray OTR/L  TOPIC:  Life Skills Group: Communication and Social Skills Development  Adult Mental Health Day Treatment  TRACK: 2A    NUMBER OF PARTICIPANTS: 5    Summary of Group / Topics Discussed:  Communication and Social Skills Development: Listening Skills: {Patients were taught and gained awareness into personal barriers to effective listening and how this can negatively impact relationships.  Patients were taught skills and practiced how to improve communication with others by becoming an active listener.  Patients identified both personal strengths and opportunities for growth in this area to improve overall communication and connection with other people as a way to build meaningful relationships to combat mental health and substance use/abuse symptoms.      Patient Session Goals / Objectives:    Identified importance of active listening skills in effective communication and how this impacts their ability to communicate clearly with other people       Improved awareness of important aspects of listening skills and how this relates to mental health recovery        Established a plan for practice of these skills in their own environments    Practiced and reflected on how to generalize taught skills to their everyday life        Patient Participation / Response:  Moderately participated, sharing some personal reflections / insights and adequately adequately received / provided feedback with other participants.    Patient presentation: Calm,alert and focused with stable mood and thought process.    Treatment Plan:  Patient has a current master individualized treatment plan.  See Epic treatment plan for more information.    NAVEEN Mcdermott/QUAN

## 2020-02-06 ENCOUNTER — HOSPITAL ENCOUNTER (OUTPATIENT)
Dept: BEHAVIORAL HEALTH | Facility: CLINIC | Age: 20
End: 2020-02-06
Attending: PSYCHIATRY & NEUROLOGY
Payer: COMMERCIAL

## 2020-02-06 PROCEDURE — G0177 OPPS/PHP; TRAIN & EDUC SERV: HCPCS

## 2020-02-06 PROCEDURE — 90853 GROUP PSYCHOTHERAPY: CPT | Performed by: COUNSELOR

## 2020-02-06 NOTE — GROUP NOTE
Life Skills/OT Group Note    PATIENT'S NAME: Remy Holloway  MRN:   [id number]  :   2000  ACCT. NUMBER: [id number]  DATE OF SERVICE: 20  START TIME: 10:00 AM  END TIME: 10:50 AM  FACILITATOR: Bryan Gerber OT  TOPIC:  Life Skills Group: Sensory Approaches in Mental Health  Adult Mental Health Day Treatment  TRACK: 2A    NUMBER OF PARTICIPANTS: 5    Summary of Group / Topics Discussed:  Sensory Approaches in Mental Health:  Coping Through the Senses: Patients were introduced/reviewed the neurosensory based skills and strategies related to supporting effective self regulation skills including grounding.  Patients were taught/reviewed the external and internal sensory systems and how they can be used for coping with mental health symptoms and stressors.  Patients were provided with an experiential opportunity to increase self-awareness of helpful sensory input with an emphasis on proprioception and vestibular. They worked to identify and name their sensory preferences and how to use them to help them participate in meaningful roles, relationships, routines, and responsibilities. Patients were encouraged to consider how they might create supportive environments that encourage use of these skills.         Patient Session Goals / Objectives:    Identified specific and individualized neurosensory skills to help when distressed      Identified skills learned and how this applies to current daily life    Established a plan for practice of these skills in their own environments        Patient Participation / Response:  Fully participated with the group by sharing personal reflections / insights and openly received / provided feedback with other participants.    Patient presentation: Good effort. Stays in group the whole session and engages in the sensory experiential with good effort and shares. , Verbalized understanding of content and Patient would benefit from additional opportunities to practice the  content to be able to generalize it to their everyday life with increased intentionality, consistency, and efficacy in support of their psychiatric recovery    Treatment Plan:  Patient has a current master individualized treatment plan.  See Epic treatment plan for more information.    Bryan Gerber, OT

## 2020-02-06 NOTE — GROUP NOTE
Life Skills/OT Group Note    PATIENT'S NAME: Remy Holloway  MRN:   [id number]  :   2000  ACCT. NUMBER: [id number]  DATE OF SERVICE: 20  START TIME: 11:00 AM  END TIME: 11:50 AM  FACILITATOR: Lopez Gray OTR/L  TOPIC:  Life Skills Group: Resiliency Development  Adult Mental Health Day Treatment  TRACK: 2A    NUMBER OF PARTICIPANTS: 5    Summary of Group / Topics Discussed:  Resiliency Development:  Self Esteem and Self Compassion: A Letter to Myself: Patients were given time for reflection and built self awareness around components of self compassion and how it relates to self esteem and overall functioning.  Patients were also given the opportunity to improve self efficacy, self sufficiency, quality of life and a sense of mastery and competency by identifying what is good and to instill hope. Patients were taught about the importance of self kindness and ways to challenge negative thinking.      Patient Session Goals / Objectives:    Identified personal strengths and qualities about themselves as a way to provide hope and build self-compassion as a way to effectively manage both mental health and substance abuse/abuse symptoms     Improved awareness of positive qualities and how these contribute to the process of recovery        Established a plan for practice of these skills in their own environments    Practiced and reflected on how to generalize taught skills to their everyday life        Patient Participation / Response:  Moderate participation overall. Shared some personal reflections / insights and openly received / provided feedback with other participants.      Patient presentation: Calm,alert and focused with stable mood and thought process. Patient expressed some frustration with his ILS worker who cancelled his appointment with him again.    Treatment Plan:  Patient has a current master individualized treatment plan.  See Epic treatment plan for more information.    Lopez SALCEDO  Isaac OTR/L

## 2020-02-06 NOTE — GROUP NOTE
Process Group Note    PATIENT'S NAME: Remy Holloway  MRN:   [id number]  :   2000  ACCT. NUMBER: [id number]  DATE OF SERVICE: 20  START TIME:  9:00 AM  END TIME:  9:50 AM  FACILITATOR: Seema Morales [facility]; Concepcion Carrillo PsyD  TOPIC:  Process Group    Diagnoses:  Autism Spectrum Disorder 299.00(F84.0)  Associated with another neurodevelopmental, mental or behavioral disorder, Attention-Deficit/Hyperactivity Disorder  314.01 (F90.9) Unspecified Attention -Deficit / Hyperactivity Disorder and Specific Learning Disorder 315.2 (F81.81) With impairment in written expression grammar and punctuation accuracy  296.43 Bipolar I Disorder Current or Most Recent Episode Manic, Severe   300.00 (F41.9) Unspecified Anxiety Disorder  302.85 (F64.1) Gender dysphoria in Adolescents and Adults  With a disorder of   sex development      Adult Mental Health Day Treatment  TRACK: 2A    NUMBER OF PARTICIPANTS: 5          Data:    Session content: At the start of this group, patients were invited to check in by identifying themselves, describing their current emotional status, and identifying issues to address in this group.   Area(s) of treatment focus addressed in this session included Symptom Management, Personal Safety and Community Resources/Discharge Planning.  Patient reported they and their parents attended a meeting/tour with [facility] and patient expressed feeling excited to join the program. Patient described opportunities to engage in the[employer] and to work at the company. Patient goal planned to meet with case manages to get approval for the program and rearrange his time with his running .     Therapeutic Interventions/Treatment Strategies:  Psychotherapist offered support, feedback and validation and reinforced use of skills. Treatment modalities used include Motivational Interviewing and Cognitive Behavioral Therapy. Interventions include Mindfulness: Facilitated discussion of when/how to use  mindfulness skills to benefit general health, mental health symptoms, and stressors and Encouraged a plan to use mindfulness skills in daily life.    Assessment:    Patient response:   Patient responded to session by accepting feedback, listening and appearing distracted    Possible barriers to participation / learning include: and no barriers identified    Health Issues:   None reported       Substance Use Review:   Substance Use: No active concerns identified.    Mental Status/Behavioral Observations  Appearance:   Appropriate   Eye Contact:   Fair   Psychomotor Behavior: Normal  Restless   Attitude:   Cooperative   Orientation:   All  Speech   Rate / Production: Normal    Volume:  Normal   Mood:    Anxious  Normal  Affect:    Appropriate   Thought Content:   Clear  Thought Form:  Coherent  Tangential     Insight:    Fair     Plan:     Safety Plan: No current safety concerns identified.  Recommended that patient call 911 or go to the local ED should there be a change in any of these risk factors.     Barriers to treatment: None identified    Patient Contracts (see media tab):  None    Substance Use: Not addressed in session     Continue or Discharge: Patient will continue in Adult Day Treatment (ADT)  as planned. Patient is likely to benefit from learning and using skills as they work toward the goals identified in their treatment plan.      Seema Morales, [facility]  February 6, 2020

## 2020-02-10 ENCOUNTER — HOSPITAL ENCOUNTER (OUTPATIENT)
Dept: BEHAVIORAL HEALTH | Facility: CLINIC | Age: 20
End: 2020-02-10
Attending: PSYCHIATRY & NEUROLOGY
Payer: COMMERCIAL

## 2020-02-10 PROCEDURE — 90853 GROUP PSYCHOTHERAPY: CPT | Performed by: PSYCHOLOGIST

## 2020-02-10 PROCEDURE — G0177 OPPS/PHP; TRAIN & EDUC SERV: HCPCS

## 2020-02-10 NOTE — GROUP NOTE
EBP Group Note    PATIENT'S NAME: Remy Holloway  MRN:   [id number]  :   2000  ACCT. NUMBER: [id number]  DATE OF SERVICE: 2/10/20  START TIME: 11:00 AM  END TIME: 11:50 AM  FACILITATOR: Concepcion Carrillo PsyD  TOPIC:  EBP Group: Mindfulness  Adult Mental Health Day Treatment  TRACK: 2A    NUMBER OF PARTICIPANTS: 6    Summary of Group / Topics Discussed:  Mindfulness: Mindfulness for Psychosis: An interactive exploration of mindfulness activities relevant to psychotic symptoms was facilitated. This topic assisted in gaining patients confident and competence in mindfulness practice and patients were able to experience the benefits of mindfulness in session. Patient s current awareness, knowledge, and practice of mindfulness skills were reviewed in session.      Patient Session Goals / Objectives:    Reviewed and promoted comfort with practice of mindfulness skills     Experienced benefits of mindfulness practice in session    Discussed patient experiences with practice in session    Identified and problem-solved individual barriers that arose in session      Patient Participation / Response:  Fully participated with the group by sharing personal reflections / insights and openly received / provided feedback with other participants.    Demonstrated understanding of topics discussed through group discussion and participation and Demonstrated understanding of mindfulness skills and benefits of practice    Treatment Plan:  Patient has a current master individualized treatment plan.  See Epic treatment plan for more information.     Patti Alfredo, D,  Licensed Psychologist

## 2020-02-10 NOTE — GROUP NOTE
RN Group Note    PATIENT'S NAME: Remy Holloway  MRN:   [id number]  :   2000  [id number]  DATE OF SERVICE: 2/10/20  START TIME:  9:00 AM  END TIME:  9:50 AM  FACILITATOR: Kezia Cleaning RN  TOPIC:  RN Group: Medication Education and Management  Adult Mental Health Day Treatment  TRACK: 2A    NUMBER OF PARTICIPANTS: 8    Summary of Group / Topics Discussed:  Medication Educations and Management:  Medication Jeopardy (Part 1): Patients provided education regarding medication safety, antidepressants, side effects, neuroleptics, expected medication outcomes, knowledge of diagnosis, symptoms, and symptom management through an engaging jeopardy-style format.     Patient Session Goals / Objectives:    ? Participated in team-based Jeopardy game  ? Identified strategies for safe use, handling, and disposal of medications  ? Discussed basic aspects of medication safety, side effects, adverse outcomes and contraindications      Patient Participation / Response:  Fully participated with the group by sharing personal reflections / insights and openly received / provided feedback with other participants.     Demonstrated understanding of topics discussed through group discussion and participation, Identified / Expressed personal readiness to practice skills and Verbalized understanding of medication education and management topic    Treatment Plan:  Patient has a current master individualized treatment plan.  See Epic treatment plan for more information.    Kezia Cleaning RN

## 2020-02-10 NOTE — GROUP NOTE
Process Group Note    PATIENT'S NAME: Remy Holloway  MRN:   [id number]  :   2000  ACCT. NUMBER: [id number]  DATE OF SERVICE: 2/10/20  START TIME: 10:00 AM  END TIME: 10:50 AM  FACILITATOR: Concepcion Carrillo PsyD  TOPIC:  Process Group    Diagnoses:    Autism Spectrum Disorder 299.00(F84.0)  Associated with another neurodevelopmental, mental or behavioral disorder, Attention-Deficit/Hyperactivity Disorder  314.01 (F90.9) Unspecified Attention -Deficit / Hyperactivity Disorder and Specific Learning Disorder 315.2 (F81.81) With impairment in written expression grammar and punctuation accuracy  296.43 Bipolar I Disorder Current or Most Recent Episode Manic, Severe   300.00 (F41.9) Unspecified Anxiety Disorder  302.85 (F64.1) Gender dysphoria in Adolescents and Adults  With a disorder of   sex development    Psychiatrist Dr Reina Rose MD, [facility]. [phone], fax: [phone]  -Nicolette Cook @ [employer] [phone], x [phone] , fax: [phone]     Therapist: Jaylin Samson In [place]  PCP:  Dr Lana Faustin @ [facility]      Adult Mental Health Day Treatment  TRACK: 2A    NUMBER OF PARTICIPANTS: 8          Data:    Session content: At the start of this group, patients were invited to check in by identifying themselves, describing their current emotional status, and identifying issues to address in this group.   Area(s) of treatment focus addressed in this session included Symptom Management, Personal Safety and Community Resources/Discharge Planning.    Client reported being safe today.  Reported mood is anxious     their goal for today is to talk further with his mom about their schedule for the future.     The client talked to the group about their family and supports, and how they only got mad at his sister-in-law once, and that they will send a congratulations card to her for getting accepted into [place].      Therapeutic Interventions/Treatment  Strategies:  Psychotherapist offered support, feedback and validation and reinforced use of skills. Treatment modalities used include Cognitive Behavioral Therapy and Dialectical Behavioral Therapy. Interventions include Cognitive Restructuring:  Explored impact of ineffective thoughts / distortions on mood and activity, Coping Skills: Assisted patient in identifying 1-2 healthy distraction skills to reduce overall distress and Mindfulness: Encouraged a plan to use mindfulness skills in daily life.    Assessment:    Patient response:   Patient responded to session by accepting feedback, giving feedback, listening, focusing on goals, being attentive, accepting support and appearing alert    Possible barriers to participation / learning include: severity of symptoms    Health Issues:   None reported       Substance Use Review:   Substance Use: No active concerns identified.    Mental Status/Behavioral Observations  Appearance:   Appropriate   Eye Contact:   Good   Psychomotor Behavior: Normal   Attitude:   Cooperative   Orientation:   All  Speech   Rate / Production: Normal    Volume:  Normal   Mood:    Anxious  Normal  Affect:    Appropriate   Thought Content:   Rumination  Thought Form:  Coherent  Logical  Tangential     Insight:    Good     Plan:     Safety Plan: none    Barriers to treatment: None identified    Patient Contracts (see media tab):  None    Substance Use: Not addressed in session     Continue or Discharge: Patient will continue in Adult Day Treatment (ADT)  as planned. Patient is likely to benefit from learning and using skills as they work toward the goals identified in their treatment plan.  Stabilization of symptoms is needed.      Yoana Alfredo., D,  Licensed Psychologist   February 10, 2020

## 2020-02-11 ENCOUNTER — HOSPITAL ENCOUNTER (OUTPATIENT)
Dept: BEHAVIORAL HEALTH | Facility: CLINIC | Age: 20
End: 2020-02-11
Attending: PSYCHIATRY & NEUROLOGY
Payer: COMMERCIAL

## 2020-02-11 PROCEDURE — G0177 OPPS/PHP; TRAIN & EDUC SERV: HCPCS

## 2020-02-11 PROCEDURE — 90853 GROUP PSYCHOTHERAPY: CPT | Performed by: PSYCHOLOGIST

## 2020-02-11 PROCEDURE — G0177 OPPS/PHP; TRAIN & EDUC SERV: HCPCS | Performed by: COUNSELOR

## 2020-02-11 NOTE — GROUP NOTE
Life Skills/OT Group Note    PATIENT'S NAME: Remy Holloway  MRN:   [id number]  :   2000  ACCT. NUMBER: [id number]  DATE OF SERVICE: 20  START TIME:  9:00 AM  END TIME:  9:50 AM  FACILITATOR: Lopez Gray OTR/L  TOPIC:  Life Skills Group: Cognitive Functioning  Adult Mental Health Day Treatment  TRACK: 5    NUMBER OF PARTICIPANTS: 2A    Summary of Group / Topics Discussed:  Cognitive Functioning Forgetfulness and Strategies to Improve Memory : Patients were taught and provided with an opportunity to gain awareness of how their mental health symptoms impact their current cognitive functioning as well as how this impacts their performance and participation in meaningful roles, relationships, and routines.  Patients were taught skills and strategies on how to monitor and improve cognitive performance through remediation or compensatory strategies.  Patients were given opportunities to practice taught skills and techniques in session and how to apply to everyday life.        Patient Session Goals / Objectives:    Identified how their mental health symptoms impact their functioning, focusing on specific cognitive challenges     Improved awareness of specific remediation and/or compensatory strategies to improve  executive functioning skills and how this relates to mental health recovery        Established a plan for practice of these skills in their own environments    Practiced and reflected on how to generalize taught skills to their everyday life          Patient Participation / Response:  Minimally participated, only when prompted / asked.    Patient presentation: Calm,alert and focused with stable mood and thought process. Reviwed discharge plans which include a trip to [place] with his family,annual Summer camp and involvement with the [facility] program in [place].    Treatment Plan:  Patient has a current master individualized treatment plan.  See Epic treatment plan for more  information.    Lopez Gray, OTR/L

## 2020-02-11 NOTE — GROUP NOTE
Process Group Note    PATIENT'S NAME: Remy Holloway  MRN:   [id number]  :   2000  ACCT. NUMBER: [id number]  DATE OF SERVICE: 20  START TIME: 10:00 AM  END TIME: 10:50 AM  FACILITATOR: Concepcion Carrillo PsyD  TOPIC:  Process Group    Diagnoses:    Autism Spectrum Disorder 299.00(F84.0)  Associated with another neurodevelopmental, mental or behavioral disorder, Attention-Deficit/Hyperactivity Disorder  314.01 (F90.9) Unspecified Attention -Deficit / Hyperactivity Disorder and Specific Learning Disorder 315.2 (F81.81) With impairment in written expression grammar and punctuation accuracy  296.43 Bipolar I Disorder Current or Most Recent Episode Manic, Severe   300.00 (F41.9) Unspecified Anxiety Disorder  302.85 (F64.1) Gender dysphoria in Adolescents and Adults  With a disorder of   sex development      Psychiatrist Dr Reina Rose MD, [facility]. [phone], fax: [phone]  -Nicolette Cook @ [employer] [phone], [phone] , fax: [phone]     Therapist: Jaylin Samson In [place]  PCP:  Dr Lana Faustin @ [facility]    Adult Mental Health Day Treatment  TRACK: 2A    NUMBER OF PARTICIPANTS: 5          Data:    Session content: At the start of this group, patients were invited to check in by identifying themselves, describing their current emotional status, and identifying issues to address in this group.   Area(s) of treatment focus addressed in this session included Symptom Management, Personal Safety and Community Resources/Discharge Planning.    Client reported being safe today.  Seema talked to the group about frustrated with scheduling different programs after they return from [place].  He reported that they were getting packed for it. They reported being able to manage anger with texts and had a goal of not sending angry texts.  Seema received feedback about being honest and trying to re-establish a relationship with the running , and listening to the .   They talked about going to [employer] hockey today.      Therapeutic Interventions/Treatment Strategies:  Psychotherapist offered support, feedback and validation and reinforced use of skills. Treatment modalities used include Cognitive Behavioral Therapy and Dialectical Behavioral Therapy. Interventions include Cognitive Restructuring:  Explored impact of ineffective thoughts / distortions on mood and activity and Assisted patient in formulating new neutral/positive alternatives to challenge less helpful / ineffective thoughts, Coping Skills: Assisted patient in identifying 1-2 healthy distraction skills to reduce overall distress and Mindfulness: Encouraged a plan to use mindfulness skills in daily life.    Assessment:    Patient response:   Patient responded to session by accepting feedback, giving feedback, listening, focusing on goals, being attentive and accepting support    Possible barriers to participation / learning include: severity of symptoms    Health Issues:   None reported       Substance Use Review:   Substance Use: No active concerns identified.    Mental Status/Behavioral Observations  Appearance:   Appropriate   Eye Contact:   Good   Psychomotor Behavior: Normal   Attitude:   Cooperative   Orientation:   All  Speech   Rate / Production: Normal    Volume:  Normal   Mood:    Anxious  Sad  frustrated   Affect:    Worrisome   Thought Content:   Rumination  Thought Form:  Coherent  Logical     Insight:    Good     Plan:     Safety Plan: Recommended that patient call 911 or go to the local ED should there be a change in any of these risk factors.     Barriers to treatment: None identified    Patient Contracts (see media tab):  None    Substance Use: Not addressed in session     Continue or Discharge: Patient will continue in Adult Day Treatment (ADT)  as planned. Patient is likely to benefit from learning and using skills as they work toward the goals identified in their treatment plan.     Stabilization  of symptoms is needed.          Yoana Alfredo., D,  Licensed Psychologist   February 11, 2020

## 2020-02-11 NOTE — GROUP NOTE
RN Group Note    PATIENT'S NAME: Remy Holloway  MRN:   [id number]  :   2000  ACCT. NUMBER: [id number]  DATE OF SERVICE: 20  START TIME: 11:00 AM  END TIME: 11:50 AM  FACILITATOR: Rebeca Melendez LPCC  TOPIC:  RN Group: Health Maintenance  Adult Mental Health Day Treatment  TRACK: 2A    NUMBER OF PARTICIPANTS: 4    Summary of Group / Topics Discussed:  Health Maintenance: Goal Setting: Meaningful goals can bring a sense of direction and purpose in life.  They also highlight our most important values. Patients were assisted by instructor to identify short term goals to promote their mental health recovery and improve overall health and wellness. Patients were educated on SMART goal setting framework as a strategy to increase outcomes and promote success.    Patient Session Goals / Objectives:  ? Explained the key concepts of SMART goal setting framework  ? Identified three goals successfully using SMART goal setting framework  ? Reviewed concept of balance in wellness as it pertains to goal setting        Patient Participation / Response:  Fully participated with the group by sharing personal reflections / insights and openly received / provided feedback with other participants.    Demonstrated understanding of topics discussed through group discussion and participation and Identified / Expressed personal readiness to practice skills    Treatment Plan:  Patient has a current master individualized treatment plan.  See Epic treatment plan for more information.    KAILASH Nelson

## 2020-02-13 ENCOUNTER — HOSPITAL ENCOUNTER (OUTPATIENT)
Dept: BEHAVIORAL HEALTH | Facility: CLINIC | Age: 20
End: 2020-02-13
Attending: PSYCHIATRY & NEUROLOGY
Payer: COMMERCIAL

## 2020-02-13 PROCEDURE — 90853 GROUP PSYCHOTHERAPY: CPT | Performed by: PSYCHOLOGIST

## 2020-02-13 PROCEDURE — G0177 OPPS/PHP; TRAIN & EDUC SERV: HCPCS

## 2020-02-13 ASSESSMENT — ANXIETY QUESTIONNAIRES
GAD7 TOTAL SCORE: 11
7. FEELING AFRAID AS IF SOMETHING AWFUL MIGHT HAPPEN: SEVERAL DAYS
3. WORRYING TOO MUCH ABOUT DIFFERENT THINGS: MORE THAN HALF THE DAYS
6. BECOMING EASILY ANNOYED OR IRRITABLE: SEVERAL DAYS
2. NOT BEING ABLE TO STOP OR CONTROL WORRYING: MORE THAN HALF THE DAYS
1. FEELING NERVOUS, ANXIOUS, OR ON EDGE: MORE THAN HALF THE DAYS
5. BEING SO RESTLESS THAT IT IS HARD TO SIT STILL: SEVERAL DAYS

## 2020-02-13 ASSESSMENT — PATIENT HEALTH QUESTIONNAIRE - PHQ9: 5. POOR APPETITE OR OVEREATING: MORE THAN HALF THE DAYS

## 2020-02-13 NOTE — GROUP NOTE
Life Skills/OT Group Note    PATIENT'S NAME: Remy Holloway  MRN:   [id number]  :   2000  ACCT. NUMBER: [id number]  DATE OF SERVICE: 20  START TIME: 10:00 AM  END TIME: 10:50 AM  FACILITATOR: Bryan Gerber OT  TOPIC:  Life Skills Group: Resiliency Development  Adult Mental Health Day Treatment  TRACK: 2A    NUMBER OF PARTICIPANTS: 6    Summary of Group / Topics Discussed:  Resiliency Development:  Coping Skills: Aftercare Coping Cards: Patients were taught how to begin to develop a relapse management kit for both mental and substance use/abuse by creating individualized coping cards focused on creating a tool that they can carry with them and be helpful when needed.     Patient Session Goals / Objectives:    Identified individualized coping skills they can use for effectively managing both mental health and substance abuse/abuse symptoms     Improved awareness of different types of coping skills and how to personalize them to their unique situation and circumstances      Established a plan for practice of these skills in their own environments    Practiced and reflected on how to generalize taught skills to their everyday life        Patient Participation / Response:  Fully participated with the group by sharing personal reflections / insights and openly received / provided feedback with other participants.    Verbalized understanding of content    Treatment Plan:  Patient has a current master individualized treatment plan.  See Epic treatment plan for more information.    Bryan Gerber OT

## 2020-02-13 NOTE — GROUP NOTE
Life Skills/OT Group Note    PATIENT'S NAME: Remy Holloway  MRN:   [id number]  :   2000  ACCT. NUMBER: [id number]  DATE OF SERVICE: 20  START TIME: 11:00 AM  END TIME: 11:50 AM  FACILITATOR: Lopez Gray OTR/L  TOPIC:  Life Skills Group: Resiliency Development  Adult Mental Health Day Treatment  TRACK: 2A    NUMBER OF PARTICIPANTS: 5    Summary of Group / Topics Discussed:  Resiliency Development:  Self Awareness Assessment Thinking about Change: Patients explored and identified their strengths, emotions, barriers, skills, and behavior patterns that occur when changes happen in life.  Focus was on recognizing these aspects and developing ways to help support moving forward, making changes as needed to support resiliency.      Patient Session Goals / Objectives:    Identified emotions, barriers, skills, strengths, and behavior patterns that occur when changes happen in life and how to effectively cope     Improved awareness of the process of change and skills and strategies that support this       Established a plan for practice of these skills in their own environments    Practiced and reflected on how to generalize taught skills to their everyday life        Patient Participation / Response:  Fully participated with the group by sharing personal reflections / insights and openly received / provided feedback with other participants.    Patient Presentation: Calm,alert,focused with stable mood and thought process.Patient completed a post-discharge mental health recovery scale receiving a score of 23/30 placing this individual in stage 4/4 recovery. Pre-admission score was 11/30 on 10/3/19 stage 2/4 recovery.    Treatment Plan:  Patient has See Epic Treatment Plan - Patient is discharging.    NAVEEN Mcdermott/QUAN

## 2020-02-19 ASSESSMENT — PATIENT HEALTH QUESTIONNAIRE - PHQ9: SUM OF ALL RESPONSES TO PHQ QUESTIONS 1-9: 14

## 2020-02-19 NOTE — ADDENDUM NOTE
Encounter addended by: Carlo Lainez on: 2/19/2020 11:13 AM   Actions taken: Visit Navigator Flowsheet section accepted

## 2020-02-20 DIAGNOSIS — R63.5 WEIGHT GAIN: Primary | ICD-10-CM

## 2020-02-20 ASSESSMENT — ANXIETY QUESTIONNAIRES: GAD7 TOTAL SCORE: 11

## 2020-04-02 ENCOUNTER — VIRTUAL VISIT (OUTPATIENT)
Dept: FAMILY MEDICINE | Facility: OTHER | Age: 20
End: 2020-04-02

## 2020-04-02 NOTE — PROGRESS NOTES
"Date: 2020 16:50:06  Clinician: Gail Ferrera  Clinician NPI: [id number]  Patient: Remy Holloway  Patient : 2000  Patient Address: [address] [place] [id number]  Patient Phone: [phone]  Visit Protocol: URI  Patient Summary:  Remy is a 20 year old ( : 2000 ) male who initiated a Visit for COVID-19 (Coronavirus) evaluation and screening. When asked the question \"Please sign me up to receive news, health information and promotions from [facility] [place] [facility].\", Remy responded \"No\".    Remy states his symptoms started 1-2 days ago.   His symptoms consist of a headache, myalgia, a cough, malaise, enlarged lymph nodes, and rhinitis. Remy also feels feverish but was unable to measure his temperature.   Symptom details     Nasal secretions: The color of his mucus is white.    Cough: Remy coughs a few times an hour and his cough is not more bothersome at night. Phlegm does not come into his throat when he coughs. He does not believe his cough is caused by post-nasal drip.     Headache: He states the headache is moderate (4-6 on a 10 point pain scale).      Remy denies having teeth pain, facial pain or pressure, chills, wheezing, sore throat, nasal congestion, and ear pain. He also denies having a sinus infection within the past year, taking antibiotic medication for the symptoms, and having recent facial or sinus surgery in the past 60 days. He is not experiencing dyspnea.   Precipitating events  He has not recently been exposed to someone with influenza. Remy has not been in close contact with any high risk individuals.   Pertinent COVID-19 (Coronavirus) information  Remy has not traveled internationally or to the areas where COVID-19 (Coronavirus) is widespread, including cruise ship travel in the last 14 days before the start of his symptoms.   Remy has not had a close contact with a laboratory-confirmed COVID-19 patient within 14 days of symptom onset. " He also has not had a close contact with a suspected COVID-19 patient within 14 days of symptom onset.   Remy is not a healthcare worker or a  and does not work in a healthcare facility. He does not live with a healthcare worker.   Pertinent medical history  Remy does not need a return to work/school note.   Weight: 200 lbs   Remy does not smoke or use smokeless tobacco.   Additional information as reported by the patient (free text): Acetaminophen taken today and previous days.  Lives with parents who are guardians and provide support with some ADL's.   Weight: 200 lbs    MEDICATIONS: Latuda oral, melatonin-pyridoxine (vitamin B6) oral, metformin oral, loratadine oral, quetiapine oral, N-A-C Sustain oral, lithium carbonate oral, guanfacine oral, clonazepam oral, ALLERGIES: NKDA  Clinician Response:  Dear Remy,   Based on the information you have provided, you do have symptoms that are consistent with Coronavirus (COVID-19).   The coronavirus causes mild to severe respiratory illness with the most common symptoms including fever, cough and difficulty breathing. Unfortunately, many viruses cause similar symptoms and it can be difficult to distinguish between viruses, especially in mild cases, so we are presuming that anyone with cough or fever has coronavirus at this time.  Coronavirus/COVID-19 has reached the point of community spread in [place], meaning that we are finding the virus in people with no known exposure risk for [place] the virus. Given the increasing commonness of coronavirus in the community we are no longer testing patients who are not critically ill.  If you are a health care worker, you should refer to your employee health office for instructions about testing and returning to work.  For everyone else who has cough or fever, you should assume you are infected with coronavirus. Since you will not be tested but have symptoms that may be consistent with  coronavirus, the CDC recommends you stay in self-isolation until these three things have happened:    You have had no fever for at least 72 hours (that is three full days of no fever without the use of medicine that reduces fevers)    AND   Other symptoms have improved (for example, when your cough or shortness of breath have improved)   AND   At least 7 days have passed since your symptoms first appeared.   How to Isolate:    Isolate yourself at home.   Do Not allow any visitors  Do Not go to work or school  Do Not go to [facility],  centers, shopping, or other public places.  Do Not shake hands.  Avoid close contact with others (hugging, kissing).   Protect Others:    Cover Your Mouth and Nose with a mask, disposable tissue or wash cloth to avoid spreading germs to others.  Wash your hands and face frequently with soap and water.   Managing Symptoms:    At this time, we primarily recommend Tylenol (Acetaminophen) for fever or pain. If you have liver or kidney problems, contact your primary care provider for instructions on use of tylenol. Adults can take 650 mg (two 325 mg pills) by mouth every 4-6 hours as needed OR 1,000 mg (two 500 mg pills) every 8 hours as needed. MAXIMUM DAILY DOSE: 3,000mg. For children, refer to dosing on bottle based on age or weight.   If you develop significant shortness of breath that prevents you from doing normal activities, please call 911 or proceed to the nearest emergency room and alert them immediately that you have been in self-isolation for possible coronavirus.   If you have a higher risk medical condition such as cancer, heart failure, end stage renal disease on dialysis or have a transplant, please reach out to your specialist's clinic to advise them of your [facility] visit should you not improve within the next two days.  For more information about COVID19 and options for caring for yourself at home, please visit the CDC website at  https://www.cdc.gov/coronavirus/2019-ncov/about/steps-when-sick.htmlFor more options for care at [facility], please visit our website at [email or web address]     Diagnosis: Cough  Diagnosis ICD: R05

## 2020-08-06 NOTE — TELEPHONE ENCOUNTER
Phone call to coordinate care with parents on [facility]  For Seema and asked them to talk about it.    Concepcion Carrillo, Yoana., D,  L.P.     .

## 2020-08-25 NOTE — GROUP NOTE
Process Group Note    PATIENT'S NAME: Remy Holloway  MRN:   [id number]  :   2000  [id number]  DATE OF SERVICE: 20  START TIME: 10:00 AM  END TIME  : 10:50 AM  FACILITATOR: Concepcion Carrillo PsyD  TOPIC:  Process Group    Diagnoses:  Client reported being safe today.  Seema talked to the group about managing anger with texts and had a goal of not sending angry texts.  Seema received feedback about being honest and trying to re-establish a relationship with the running , and listening to the .  Autism Spectrum Disorder 299.00(F84.0)  Associated with another neurodevelopmental, mental or behavioral disorder, Attention-Deficit/Hyperactivity Disorder  314.01 (F90.9) Unspecified Attention -Deficit / Hyperactivity Disorder and Specific Learning Disorder 315.2 (F81.81) With impairment in written expression grammar and punctuation accuracy  296.43 Bipolar I Disorder Current or Most Recent Episode Manic, Severe   300.00 (F41.9) Unspecified Anxiety Disorder  302.85 (F64.1) Gender dysphoria in Adolescents and Adults  With a disorder of   sex development  Psychiatrist Dr Reina Rose MD, [facility]. [phone], fax: [phone]  -Nicolette Cook @ [employer] [phone], x [phone] , fax: [phone]     Therapist: Jaylin Samson In [place]  PCP:  Dr Lana Faustin @ [facility]      Adult Mental Health Day Treatment  TRACK: 2A    NUMBER OF PARTICIPANTS: 7          Data:    Session content: At the start of this group, patients were invited to check in by identifying themselves, describing their current emotional status, and identifying issues to address in this group.   Area(s) of treatment focus addressed in this session included Symptom Management, Personal Safety and Community Resources/Discharge Planning.    Client reported being safe today.  Seema talked to the group about how they did not feel well yesterday and texted to the running  ahead of an  appointment to cancel.  They talked about managing anger with texts and had a goal of not sending angry texts.  Seema received feedback about being honest and trying to re-establish a relationship with the running , and listening to the .  They invited the running  to a birthday and graduation party in mid-February.      Therapeutic Interventions/Treatment Strategies:  Psychotherapist offered support, feedback and validation and reinforced use of skills. Treatment modalities used include Cognitive Behavioral Therapy. Interventions include Cognitive Restructuring:  Assisted patient in formulating new neutral/positive alternatives to challenge less helpful / ineffective thoughts, Coping Skills: Assisted patient in identifying 1-2 healthy distraction skills to reduce overall distress and Mindfulness: Encouraged a plan to use mindfulness skills in daily life.    Assessment:    Patient response:   Patient responded to session by accepting feedback, giving feedback, listening, focusing on goals, being attentive and accepting support    Possible barriers to participation / learning include: severity of symptoms    Health Issues:   None reported       Substance Use Review:   Substance Use: No active concerns identified.    Mental Status/Behavioral Observations  Appearance:   Appropriate   Eye Contact:   Good   Psychomotor Behavior: restlessness  Attitude:   Cooperative   Orientation:   All  Speech   Rate / Production: Normal    Volume:  Normal   Mood:    Anxious   Affect:    Appropriate   Thought Content:   Rumination  Thought Form:  Coherent  Logical  Tangential     Insight:    Good     Plan:     Safety Plan: Recommended that patient call 911 or go to the local ED should there be a change in any of these risk factors.     Barriers to treatment: None identified    Patient Contracts (see media tab):  None    Substance Use: Not addressed in session     Continue or Discharge: Patient will continue in  Adult Day Treatment (ADT)  as planned. Patient is likely to benefit from learning and using skills as they work toward the goals identified in their treatment plan.     Stabilization of symptoms is needed.        Yoana Alfredo., D,  Licensed Psychologist   January 21, 2020   25-Aug-2020 09:35

## 2020-08-28 ENCOUNTER — TELEPHONE (OUTPATIENT)
Dept: OTHER | Facility: OUTPATIENT CENTER | Age: 20
End: 2020-08-28

## 2020-11-07 ENCOUNTER — HEALTH MAINTENANCE LETTER (OUTPATIENT)
Age: 20
End: 2020-11-07

## 2021-03-19 ENCOUNTER — IMMUNIZATION (OUTPATIENT)
Dept: NURSING | Facility: CLINIC | Age: 21
End: 2021-03-19
Payer: COMMERCIAL

## 2021-03-19 PROCEDURE — 0011A PR COVID VAC MODERNA 100 MCG/0.5 ML IM: CPT

## 2021-03-19 PROCEDURE — 91301 PR COVID VAC MODERNA 100 MCG/0.5 ML IM: CPT

## 2021-04-16 ENCOUNTER — IMMUNIZATION (OUTPATIENT)
Dept: NURSING | Facility: CLINIC | Age: 21
End: 2021-04-16
Attending: INTERNAL MEDICINE
Payer: COMMERCIAL

## 2021-04-16 PROCEDURE — 91301 PR COVID VAC MODERNA 100 MCG/0.5 ML IM: CPT

## 2021-04-16 PROCEDURE — 0012A PR COVID VAC MODERNA 100 MCG/0.5 ML IM: CPT

## 2021-05-17 ENCOUNTER — OFFICE VISIT (OUTPATIENT)
Dept: FAMILY MEDICINE | Facility: CLINIC | Age: 21
End: 2021-05-17
Payer: COMMERCIAL

## 2021-05-17 VITALS
HEART RATE: 85 BPM | TEMPERATURE: 98.8 F | HEIGHT: 72 IN | SYSTOLIC BLOOD PRESSURE: 110 MMHG | BODY MASS INDEX: 27.77 KG/M2 | WEIGHT: 205 LBS | DIASTOLIC BLOOD PRESSURE: 72 MMHG | OXYGEN SATURATION: 96 % | RESPIRATION RATE: 16 BRPM

## 2021-05-17 DIAGNOSIS — Z23 NEED FOR PROPHYLACTIC VACCINATION AGAINST DIPHTHERIA AND TETANUS: Primary | ICD-10-CM

## 2021-05-17 DIAGNOSIS — Z02.89 ENCOUNTER FOR CAMP ADMISSION HISTORY AND PHYSICAL: ICD-10-CM

## 2021-05-17 DIAGNOSIS — F31.77 BIPOLAR DISORDER, IN PARTIAL REMISSION, MOST RECENT EPISODE MIXED (H): ICD-10-CM

## 2021-05-17 RX ORDER — OLANZAPINE 5 MG/1
5-10 TABLET ORAL PRN
COMMUNITY
Start: 2021-04-09 | End: 2024-01-16

## 2021-05-17 RX ORDER — DIVALPROEX SODIUM 500 MG/1
2000 TABLET, EXTENDED RELEASE ORAL AT BEDTIME
COMMUNITY
Start: 2021-04-09

## 2021-05-17 RX ORDER — LITHIUM CARBONATE 450 MG
TABLET, EXTENDED RELEASE ORAL
Qty: 90 TABLET | Refills: 0
Start: 2021-05-17

## 2021-05-17 ASSESSMENT — MIFFLIN-ST. JEOR: SCORE: 1966.12

## 2021-05-17 NOTE — PROGRESS NOTES
"OVERVIEW: \"Seema\" Jewel is a 21 year old gender-fluid individual who uses they/them pronouns who presents to [facility] today for Physical (camp physical tdap and an updated vaccine list )  In need of a Tetanus immunization and a print out of past immunizations.     Has received both Covid-19 immunizations. The 2nd injection was 4/16.     ASSESSMENT/PLAN:    21-year-old gender fluid individual with autism, bipolar, ADHD, anxiety and gender dysphoria.  Here for camp physical.    Plan-given tetanus.  Immunizations were printed.  Medications were reviewed at length along with their father, and.  Medications were printed.  Of note these may be changed by Dr. Rose prior to camp starting.    Form for camp was filled out.    --Amador Cuevas MD    SUBJECTIVE:   Seema is a 21-year-old transgender, gender fluid person who formally went by the name Remy.  Now uses they, them pronouns.  Here for a camp physical.  Attends a [employer] in [place].  Has attended for approximately 5 years.  Accompanied today by their father, Ed.  Main medical issues have been psychiatric with autism bipolar ADHD and anxiety.  This is all managed by Dr. Reina Rose.  Please see note from Dr. Rose from earlier today done by telemedicine.  Is in need of a tetanus vaccination.  Has had both Covid vaccinations.  No other complaints paints or concerns.    Review Of Systems:    Feeling a bit less anxious than most times in the recent years.  This is documented in Dr. Brink note from earlier today.        Cardiovascular: negative  Respiratory: No shortness of breath, dyspnea on exertion, cough, or hemoptysis  Gastrointestinal: negative  Genitourinary: negative    Problem list per EMR:  Patient Active Problem List   Diagnosis    Constipation    Attention deficit hyperactivity disorder (ADHD)    GENERALIZED ANXIETY and depression    LD- nonverbal, dysgraphia and dyspraxia    Dysuria/ likely passed a kidney stone    " Exercise-induced asthma    Autism spectrum disorder    Gender dysphoria in adolescent and adult    Folliculitis    Gastroesophageal reflux disease without esophagitis    Bipolar disorder in partial remission (H)    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)    Gluten-sensitive enteropathy    Gluten intolerance    Seasonal allergic rhinitis    Mild intermittent asthma without complication    Gender dysphoria, unspecified    Bipolar 1 disorder (H)       Current Outpatient Medications   Medication Sig Dispense Refill    acetylcysteine (N-ACETYL CYSTEINE) 600 MG CAPS capsule Take two po bid 60 capsule 3    clonazePAM (KLONOPIN) 1 MG tablet 1 mg At Bedtime Take 0.5 -1 tablet in the morning, and may repeat one additional dose q day prn 90 tablet 1    divalproex sodium extended-release (DEPAKOTE ER) 500 MG 24 hr tablet Take 500 mg by mouth      lithium (ESKALITH) 450 MG CR tablet 450 mg 450 mg in AM and 450 mg in HS 90 tablet 3    lithium (ESKALITH/LITHOBID) 300 MG CR tablet 900 mg At Bedtime Take 2 po q am and one po q hs along with 450mg dose 90 tablet 1    loratadine (CLARITIN) 10 MG capsule Take 10 mg by mouth daily 30 capsule 3    lurasidone (LATUDA) 80 MG TABS tablet Take 160 mg by mouth daily 2 tabs at bedtime      melatonin 3 MG tablet Take 2-3 tablets at night 30 tablet 0    metFORMIN (GLUCOPHAGE) 1000 MG tablet Take 1 tablet (1,000 mg) by mouth 2 times daily (with meals)      OLANZapine (ZYPREXA) 5 MG tablet Take 5-10 mg by mouth as needed      QUEtiapine (SEROQUEL) 400 MG tablet Take 1 tablet (400 mg) by mouth At Bedtime Take 2 (200mg) po q hs 30 tablet 3    QUEtiapine (SEROQUEL) 50 MG tablet 50 mg as needed Take 1-2 po TID prn 120 tablet 1    guanFACINE HCl (INTUNIV) 4 MG TB24 Take one daily for ADHD 30 tablet 0    polyethylene glycol (MIRALAX/GLYCOLAX) powder Take 17 g (1 capful) by mouth daily PRN constipation 1 Bottle     sodium chloride (OCEAN) 0.65 % nasal spray Spray 2 sprays into  "both nostrils daily as needed for congestion (Patient not taking: Reported on 5/17/2021) 30 mL 0       Allergies   Allergen Reactions    Gluten Meal Other (See Comments)    No Known Allergies         OBJECTIVE    Vitals: /72   Pulse 85   Temp 98.8  F (37.1  C)   Resp 16   Ht 1.826 m (5' 11.89\")   Wt 93 kg (205 lb)   SpO2 96%   BMI 27.89 kg/m    BMI= Body mass index is 27.89 kg/m .  Appears in no distress.  More conversant than the past few times that I have seen them.    HEENT is unremarkable    Cardiovascular-regular rate and rhythm without murmur    Lungs-clear to auscultation    Abdomen-normal     exam-normal male genitalia.    Extremities-normal.    SEE TOP OF NOTE FOR ASSESSMENT AND PLAN  35 minutes spent on the date of the encounter doing chart review, history and exam, documentation and further activities as noted in the note.     --Amador Cuevas MD  [facility], Department of Family Medicine and Community Health  "

## 2021-05-17 NOTE — NURSING NOTE
Prior to immunization administration, verified patients identity using patient s name and date of birth. Please see Immunization Activity for additional information.     Screening Questionnaire for Adult Immunization    Are you sick today?   No   Do you have allergies to medications, food, a vaccine component or latex?   No   Have you ever had a serious reaction after receiving a vaccination?   No   Do you have a long-term health problem with heart, lung, kidney, or metabolic disease (e.g., diabetes), asthma, a blood disorder, no spleen, complement component deficiency, a cochlear implant, or a spinal fluid leak?  Are you on long-term aspirin therapy?   No   Do you have cancer, leukemia, HIV/AIDS, or any other immune system problem?   No   Do you have a parent, brother, or sister with an immune system problem?   No   In the past 3 months, have you taken medications that affect  your immune system, such as prednisone, other steroids, or anticancer drugs; drugs for the treatment of rheumatoid arthritis, Crohn s disease, or psoriasis; or have you had radiation treatments?   No   Have you had a seizure, or a brain or other nervous system problem?   No   During the past year, have you received a transfusion of blood or blood    products, or been given immune (gamma) globulin or antiviral drug?   No   For women: Are you pregnant or is there a chance you could become       pregnant during the next month?   No   Have you received any vaccinations in the past 4 weeks?   No     Immunization questionnaire answers were all negative.        Per orders of Dr. Cuevas, injection of Tdap given by Florina Davalos MA. Patient instructed to remain in clinic for 15 minutes afterwards, and to report any adverse reaction to me immediately.       Screening performed by Florina Davalos MA on 5/17/2021 at 4:03 PM.

## 2021-05-17 NOTE — NURSING NOTE
"21 year old  Chief Complaint   Patient presents with     Physical     camp physical tdap and an updated vaccine list        Blood pressure 110/72, pulse 85, temperature 98.8  F (37.1  C), resp. rate 16, height 1.826 m (5' 11.89\"), weight 93 kg (205 lb), SpO2 96 %. Body mass index is 27.89 kg/m .  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)     Gluten-sensitive enteropathy     Gluten intolerance     Seasonal allergic rhinitis     Mild intermittent asthma without complication     Gender dysphoria, unspecified     Bipolar 1 disorder (H)       Wt Readings from Last 2 Encounters:   05/17/21 93 kg (205 lb)   12/03/19 94.2 kg (207 lb 12 oz)     BP Readings from Last 3 Encounters:   05/17/21 110/72   12/03/19 117/74   11/19/19 122/57         Current Outpatient Medications   Medication     acetylcysteine (N-ACETYL CYSTEINE) 600 MG CAPS capsule     clonazePAM (KLONOPIN) 1 MG tablet     divalproex sodium extended-release (DEPAKOTE ER) 500 MG 24 hr tablet     lithium (ESKALITH) 450 MG CR tablet     lithium (ESKALITH/LITHOBID) 300 MG CR tablet     loratadine (CLARITIN) 10 MG capsule     lurasidone (LATUDA) 80 MG TABS tablet     melatonin 3 MG tablet     metFORMIN (GLUCOPHAGE) 1000 MG tablet     OLANZapine (ZYPREXA) 5 MG tablet     QUEtiapine (SEROQUEL) 400 MG tablet     QUEtiapine (SEROQUEL) 50 MG tablet     guanFACINE HCl (INTUNIV) 4 MG TB24     polyethylene glycol (MIRALAX/GLYCOLAX) powder     sodium chloride (OCEAN) 0.65 % nasal spray     No current " facility-administered medications for this visit.        Social History     Tobacco Use     Smoking status: Never Smoker     Smokeless tobacco: Never Used   Substance Use Topics     Alcohol use: No     Drug use: No       Health Maintenance Due   Topic Date Due     ADVANCE CARE PLANNING  Never done     Pneumococcal Vaccine: Pediatrics (0 to 5 Years) and At-Risk Patients (6 to 64 Years) (1 of 2 - PPSV23) Never done     HEPATITIS C SCREENING  Never done     MENTAL HEALTH TX PLAN  12/25/2019     PREVENTIVE CARE VISIT  01/24/2020     ASTHMA CONTROL TEST  06/03/2020     ASTHMA ACTION PLAN  12/03/2020     DTAP/TDAP/TD IMMUNIZATION (7 - Td) 04/12/2021       No results found for: PAP      May 17, 2021 3:14 PM

## 2021-08-13 ENCOUNTER — TELEPHONE (OUTPATIENT)
Dept: FAMILY MEDICINE | Facility: CLINIC | Age: 21
End: 2021-08-13

## 2021-08-13 NOTE — TELEPHONE ENCOUNTER
Form or Letter Request    Type or form/letter needing completion: Physical Exam Form  Provider: Dr. Cuevas  Has provider seen patient for office visit related to reason for form request? Yes  Date form needed: 8/17/21   Once completed: Fax form to: [phone] and mail to home address
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
## 2021-09-05 ENCOUNTER — HEALTH MAINTENANCE LETTER (OUTPATIENT)
Age: 21
End: 2021-09-05

## 2021-11-22 ENCOUNTER — VIRTUAL VISIT (OUTPATIENT)
Dept: ENDOCRINOLOGY | Facility: CLINIC | Age: 21
End: 2021-11-22
Payer: COMMERCIAL

## 2021-11-22 VITALS — HEIGHT: 71 IN | WEIGHT: 187 LBS | BODY MASS INDEX: 26.18 KG/M2

## 2021-11-22 DIAGNOSIS — R63.5 ABNORMAL WEIGHT GAIN: Primary | ICD-10-CM

## 2021-11-22 PROCEDURE — 99203 OFFICE O/P NEW LOW 30 MIN: CPT | Mod: 95 | Performed by: INTERNAL MEDICINE

## 2021-11-22 ASSESSMENT — MIFFLIN-ST. JEOR: SCORE: 1870.36

## 2021-11-22 ASSESSMENT — PAIN SCALES - GENERAL: PAINLEVEL: MODERATE PAIN (4)

## 2021-11-22 NOTE — LETTER
"2021       RE: Remy Holloway  [address]  [place] [facility] [id number]     Dear Colleague,    Thank you for referring your patient, Remy Holloway, to the [facility]. Please see a copy of my visit note below.    Seema Castañeda is a 21 year old who is being evaluated via a billable video visit.      How would you like to obtain your AVS? MyChart and mailed   If the video visit is dropped, the invitation should be resent by: Text to cell phone: [phone]  Will anyone else be joining your video visit? Yes: Both Parents    New Medical Weight Management Consult    PATIENT:  Remy Holloway  MRN:         [id number]  :         2000  HUSSEIN:         2021    Dear Lana Faustin MD,    I had the pleasure of seeing your patient, Remy Holloway.  Full intake/assessment done to determine barriers to weight loss success and develop a treatment plan.  Remy Holloway is a 21 year old adult interested in treatment of medical problems associated with weight.  Her weight today is 187 lbs 0 oz, Body mass index is 26.08 kg/m ., and she has the following co-morbidities:     2021   I have the following health issues associated with obesity: None of the above   I have the following symptoms associated with obesity: None of the above     Patient Goals 2021   I am interested in having a healthier weight to diminish current health problems: Yes   I am interested in having a healthier weight in order to prevent future health problems: Yes   I am interested in having a healthier weight in order to have a future surgery: No     Referring Provider 2021   Please name the provider who referred you to Medical Weight Management.  If you do not know, please answer: \"I Don't Know\". none     Wt Readings from Last 4 Encounters:   21 84.8 kg (187 lb)   21 93 kg (205 lb)   19 " 94.2 kg (207 lb 12 oz)   11/19/19 95.3 kg (210 lb)     Weight History 11/18/2021   How concerned are you about your weight? Somewhat Concerned   Would you describe your weight gain as gradual? No   I became overweight: As an Adult   The following factors have contributed to my weight gain:  Mental Health Issues, Started on Medication that Caused Weight Gain, Eating Too Much   I have tried the following methods to lose weight: Other   Please list the other methods.  food choice   My lowest weight since age 17 was: 190   My highest weight since age 17 was: 205   The most weight I have ever lost was: (lbs) 15   I have the following family history of obesity/being overweight:  My father is overweight   Has anyone in your family had weight loss surgery? No   How has your weight changed over the last year?  Lost   How many pounds? 10 to 15, especially when I went to [facility] in 2021 and due to kosher dining , food was very carefully chosen.     Diet Recall 11/18/2021   Glass juice/day 0   Glass milk/day 0   Glass sugary drink/day 0   How many cans/bottles of sugar pop/soda/tea/sports drinks do you drink in a day? 0   Diet drink/day 0   Alcohol Never     Eating Habits 11/18/2021   Generally, my meals include foods like these: bread, pasta, rice, potatoes, corn, crackers, sweet dessert, pop, or juice. A Few Times a Week   Generally, my meals include foods like these: fried meats, brats, burgers, french fries, pizza, cheese, chips, or ice cream. Less Than Weekly   Eat fast food (like [employer], [employer], [employer]). Never   Eat at a buffet or sit-down restaurant. Less Than Weekly   Eat most of my meals in front of the TV or computer. Never   Often skip meals, eat at random times, have no regular eating times. Once a Week   Rarely sit down for a meal but snack or graze throughout.  Never   Eat extra snacks between meals. Everyday   Eat most of my food at the end of the day. Everyday   Eat in the middle of the night or  wake up at night to eat. Once a Week   Eat extra snacks to prevent or correct low blood sugar. Never   Eat to prevent acid reflux or stomach pain. Never   Worry about not having enough food to eat. Never   Have you been to the food shelf at least a few times this year? No   I eat when I am depressed. Once a Week   I eat when I am stressed. Once a Week   I eat when I am bored. Once a Week   I eat when I am anxious. Once a Week   I eat when I am happy or as a reward. Once a Week   I feel hungry all the time even if I just have eaten. Everyday   Feeling full is important to me. Almost Everyday   I finish all the food on my plate even if I am already full. A Few Times a Week   I can't resist eating delicious food or walk past the good food/smell. Less Than Weekly   I eat/snack without noticing that I am eating. Less Than Weekly   I eat when I am preparing the meal. Never   I eat more than usual when I see others eating. Less Than Weekly   I have trouble not eating sweets, ice cream, cookies, or chips if they are around the house. Everyday   I think about food all day. A Few Times a Week   What foods, if any, do you crave? Sweets/Candy/Chocolate   Please list any other foods you crave? pizza, gluten-free, dairy free     Activity/Exercise History 11/18/2021   How much of a typical 12 hour day do you spend sitting? Half the Day   How much of a typical 12 hour day do you spend lying down? Half the Day   How much of a typical day do you spend walking/standing? Less Than Half the Day   How many hours (not including work) do you spend on the TV/Video Games/Computer/Tablet/Phone? 6 Hours or More   How many times a week are you active for the purpose of exercise? Once a Week   What keeps you from being more active? Other   How many total minutes do you spend doing some activity for the purpose of exercising when you exercise? More Than 30 Minutes     PAST MEDICAL HISTORY:  Past Medical History:   Diagnosis Date     ADHD       Amblyopia, unspecified      Bipolar 1 disorder (H)      Esotropia, unspecified      Gender dysphoria      Lack of normal physiological development, unspecified     Normal chromosomes, nl MRI, neg fragile X     Learning disabilities      Redundant prepuce and phimosis     Penile coronal adhesions-repaired     Umbilical hernia without mention of obstruction or gangrene     repaired     Unspecified otitis media     Recurrent     Work/Social History Reviewed With Patient 11/18/2021   My employment status is: Part-Time   My job is: [employer]   How much of your job is spent on the computer or phone? Less Than 50%   How many hours do you spend commuting to work daily?  1 to 2 hours   What is your marital status? Single   If in a relationship, is your significant other overweight? N/A   If you have children, are they overweight? N/A   Who do you live with?  parents   Are they supportive of your health goals? Yes   Who does the food shopping?  parents     Mental Health History Reviewed With Patient 11/18/2021   Have you ever been physically or sexually abused? No   If yes, do you feel that the abuse is affecting your weight? N/A   If yes, would you like to talk to a counselor about the abuse? Yes   How often in the past 2 weeks have you felt little interest or pleasure in doing things? For Several Days   Over the past 2 weeks how often have you felt down, depressed, or hopeless? For Several Days     Sleep History Reviewed With Patient 11/18/2021   How many hours do you sleep at night? 7   Do you think that you snore loudly or has anybody ever heard you snore loudly (louder than talking or so loud it can be heard behind a shut door)? No   Has anyone seen or heard you stop breathing during your sleep? No   Do you often feel tired, fatigued, or sleepy during the day? Yes   Do you have a TV/Computer in your bedroom? No     MEDICATIONS:   Current Outpatient Medications   Medication Sig Dispense Refill      "acetylcysteine (N-ACETYL CYSTEINE) 600 MG CAPS capsule Take two po bid 60 capsule 3     clonazePAM (KLONOPIN) 1 MG tablet 1 mg At Bedtime Take 0.5 -1 tablet in the morning, and may repeat one additional dose q day prn 90 tablet 1     divalproex sodium extended-release (DEPAKOTE ER) 500 MG 24 hr tablet Take 500 mg by mouth       lithium (ESKALITH CR/LITHOBID) 450 MG CR tablet 450 mg in AM and 900 mg in HS 90 tablet 0     loratadine (CLARITIN) 10 MG capsule Take 10 mg by mouth daily 30 capsule 3     lurasidone (LATUDA) 80 MG TABS tablet Take 160 mg by mouth daily 2 tabs at bedtime       melatonin 3 MG tablet Take 2-3 tablets at night 30 tablet 0     OLANZapine (ZYPREXA) 5 MG tablet Take 5-10 mg by mouth as needed       QUEtiapine (SEROQUEL) 50 MG tablet 50 mg as needed Take 1-2 po TID prn 120 tablet 1     metFORMIN (GLUCOPHAGE) 1000 MG tablet Take 1 tablet (1,000 mg) by mouth 2 times daily (with meals)       polyethylene glycol (MIRALAX/GLYCOLAX) powder Take 17 g (1 capful) by mouth daily PRN constipation 1 Bottle      QUEtiapine (SEROQUEL) 400 MG tablet Take 1 tablet (400 mg) by mouth At Bedtime Take 2 (200mg) po q hs 30 tablet 3     ALLERGIES:   Allergies   Allergen Reactions     Dairy Digestive Other (See Comments)     Gluten Meal Other (See Comments)     No Known Allergies      Serotonin Reuptake Inhibitors (Ssris) [Serotonin Reuptake Inhibitors]      Other reaction(s): Psychosis  Manic with SSRI use     PHYSICAL EXAM:  Ht 1.803 m (5' 11\")   Wt 84.8 kg (187 lb)   BMI 26.08 kg/m     A & O x 3  HEENT: NCAT, mucous membranes moist  Respirations unlabored  Location of obesity: Central Obesity    ASSESSMENT:  Remy is a patient with mature onset overweight without significant element of familial/genetic influence and without current health consequences. She does not need aggressive weight loss plan due to weight.  Consultation is primarily for induced eating with lack of satiation and at times weight gain due to " Seroquel. The medication is taken for treatment of bipolar and is apparently the result of many tried medications by psychiatrist.    Remy Holloway has perception of intense hunger and eats late night after seroquel.    Her problem is complicated by mental health/psychopharmacological barriers    Her ability to lose weight is impacted by lack of confidence.    PLAN:    Reviewed status of seroquel as strong appetite stimulant and common contributor to sometimes alarming weight gain. Reviewed options of reducing or stopping seroquel versus adding a medication like topiramate or semaglutide with their own potential side effects. Parents elected to try seroquel reduction in consultation with psychiatrist.    RTC:    12 weeks.            Again, thank you for allowing me to participate in the care of your patient.      Sincerely,    Yuniel Mayo MD
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
## 2021-11-22 NOTE — NURSING NOTE
"Chief Complaint   Patient presents with     RECHECK     New MWM -- BMI:28.9 + comorbidities       Vitals:    11/22/21 0826   Weight: 84.8 kg (187 lb)   Height: 1.803 m (5' 11\")       Body mass index is 26.08 kg/m .          JIMMY GATES EMT    "

## 2021-11-22 NOTE — PROGRESS NOTES
Seema They Them is a 21 year old who is being evaluated via a billable video visit.      How would you like to obtain your AVS? MyChart and mailed   If the video visit is dropped, the invitation should be resent by: Text to cell phone: [phone]  Will anyone else be joining your video visit? Yes: Both Parents    Video-Visit Details    Type of service:  Video Visit    Start: 11/22/2021 08:40 am  Stop: 11/22/2021 09:03 am    Originating Location (pt. Location): Home    Distant Location (provider location):  [facility] [place]     Platform used for Video Visit: [employer]

## 2021-11-22 NOTE — PROGRESS NOTES
"Remy Holloway is a 21 year old adult who is being evaluated via a billable video visit.      The patient has been notified of following:     \"This video visit will be conducted via a call between you and your physician/provider. We have found that certain health care needs can be provided without the need for an in-person physical exam.  This service lets us provide the care you need with a video conversation.  If a prescription is necessary we can send it directly to your pharmacy.  If lab work is needed we can place an order for that and you can then stop by our lab to have the test done at a later time.    Video visits are billed at different rates depending on your insurance coverage.  Please reach out to your insurance provider with any questions.    If during the course of the call the physician/provider feels a video visit is not appropriate, you will not be charged for this service.\"    Patient has given verbal consent for Video visit? Yes  How would you like to obtain your AVS? MyChart  Will anyone else be joining your video visit? No  {If patient encounters technical issues they should call [phone]      Video-Visit Details    Type of service:  Video Visit    Video Start Time: 8:00 AM  Video End Time: 8:29 AM    Originating Location (pt. Location): Home    Distant Location (provider location):  [facility] [place]     Platform used for Video Visit: [employer]    During this virtual visit the patient is located in [place], patient verifies this as the location during the entirety of this visit.     New Weight Management Nutrition Consultation    Remy Holloway is a 21 year old adult presents today for new weight management nutrition consultation. Patient presents today accompanied by parents. Patient referred by Dr. Mayo on November 22, 2021.    Patient with Co-morbidities of obesity including:  none     Anthropometrics:  Estimated body mass index is 26.08 kg/m  as " "calculated from the following:    Height as of 11/22/21: 1.803 m (5' 11\").    Weight as of 11/22/21: 84.8 kg (187 lb).     Current weight (per home scale): 184 lbs     Wt Readings from Last 15 Encounters:   11/22/21 84.8 kg (187 lb)   05/17/21 93 kg (205 lb)   12/03/19 94.2 kg (207 lb 12 oz)   11/19/19 95.3 kg (210 lb)   08/05/19 89.6 kg (197 lb 8 oz)   03/13/19 97.1 kg (214 lb)   02/20/19 95.8 kg (211 lb 3.2 oz)   01/24/19 95.7 kg (211 lb) (95 %, Z= 1.65)*   06/11/18 89.4 kg (197 lb 1.3 oz) (92 %, Z= 1.38)*   04/19/18 88 kg (194 lb) (91 %, Z= 1.32)*   01/30/18 81.6 kg (180 lb) (83 %, Z= 0.96)*   01/29/18 83 kg (183 lb 0.6 oz) (85 %, Z= 1.05)*   08/18/17 83.5 kg (184 lb 1.9 oz) (87 %, Z= 1.13)*   04/11/17 87.8 kg (193 lb 8 oz) (92 %, Z= 1.41)*   05/04/15 78.5 kg (173 lb) (90 %, Z= 1.27)*     * Growth percentiles are based on CDC (Boys, 2-20 Years) data.       Medications for Weight Loss:  none    NUTRITION HISTORY  See MD note for details.  Per questionnaire: \"medications for bi-polar & depression make weight and appetite variable.\"  Strong appetite at night after taking seroquel. Will eat a couple sandwiches + fruit at 9pm if taken meds at 8:30pm. Going to bed around 10 pm.   Does not eat dairy, avoids gluten/wheat.   Summer camp, kept Kosher, structured meals, lost 20-30 lbs. Camp ended August, was 180 lbs, starting to creep back up.    Goes to a day program 4 days per week, brining lunch.   Drinking coffee through the day. Drinking a lot of water.   Mom reports pt with h/o iron deficiency anemia.     Recent food recall:  If you do eat breakfast, what types of food do you eat? Overnight oatmeal with raisin sometimes skipping, not feeling very hungry in the am.   Do you typically eat lunch? Yes   If you do eat lunch, what types of food do you typically eat?  Turkey sandwich, yogurt, pear, carrot sticks/cucumbers + seltzer water     Do you typically eat supper? Yes   If you do eat supper, what types of food do you " "typically eat? meat, veggies, fruit, and sometimes potato or GF pasta or rice; protein + cooked veggie/salad + starch (rice). 1 plate.      Do you typically eat snacks? Yes   If you do snack, what types of food do you typically eat? sandwiches, peanut butter & jelly or turkey       Physical Activity:  Working out with a  1-2 times per week. Walking half mile to coffee shop.     Nutrition Prescription  Recommended energy/nutrient modification.    Nutrition Diagnosis  Overweight r/t long history of positive energy balance aeb BMI >25.    Nutrition Intervention  Materials/education provided on Volumetric eating to help satiety level on fewer calories; portion control and healthy food choices (Plate Method and Volumetrics handouts), 100 calorie snack choices, meal and snack planning and websites, and mindful eating habits. Pt's Mom inquiring about foods to help bring up iron levels, provided education on high-iron foods and effect calcium, vitamin C and caffeine have on iron absorption. Encouraged pt to talk with psychiatrist about timing of medication, if possible to take right before bed. Patient and parents demonstrates understanding.     Expected Engagement: good    The Plate Method  [email or web address]    Protein Sources   [email or web address]     Carbohydrates  [email or web address]     Mindful Eating  [email or web address]     Summary of Volumetrics Eating Plan  [email or web address]     Healthy Recipe Resources:  \"The Volumetrics Eating Plan\" by Lida Fuentes, Ph.D.  https://www.Tripsourcing[employer]ive.[employer]/  www.[employer] computer.[employer] Inc  www.[employer]ung[employer]ungi Inc[employer].org  Dash Diet Recipes [facility]  www.extension.[facility] - the recipe box  \"Cooking that Counts\" by editors of CookingLight  https://www.[facility]/communityculinary/[facility]_Cookbook_KT_Final_11-6_NoCrops-compressed.pdf  Https://www.Eyepicmyplate.gov/myplatekitchen  https://snaped.fns.usda.gov/recipes-menus - SNAP " recipes  https://www.diabetesfoodhub.org/all-recipes.html  https://www.Bright.com.com/    High-iron foods:  100% iron-fortified ready-to-eat cereal   cup, 18 mg  Grits, instant   cup, 7.1   Cream of wheat   cup, 5.2   Oatmeal, instant   cup, 5 mg  Soybeans, cooked   cup, 4.4 mg  White beans, canned   cup, 3.9 mg   Lentils   cup, 3.3 mg  White rice 1/3 cup, 3 mg  Spinach   cup cooked, 1 cup raw, 3 mg   Beef tenderloin 3 oz, 3 mg  Baked beans 1/3 cup, 3 mg  Vegetable or soy burger 1 clyde, 2.9 mg  Soy milk 1 cup (8 oz), 2.7 mg   Chickpeas   cup, 2.5 mg  Kidney beans   cup, 2.5 mg  Sardines 3 oz, 2.5 mg   Nuts: almonds or pistachios   cup, 1.3 mg   [place] sprouts, cooked   cup, 1 mg   Egg 1 whole, 1 mg      Follow-Up:  PRN    Time spent with patient: 28 minutes.  Nhi Perez RD, LD

## 2021-11-23 ENCOUNTER — VIRTUAL VISIT (OUTPATIENT)
Dept: ENDOCRINOLOGY | Facility: CLINIC | Age: 21
End: 2021-11-23
Payer: COMMERCIAL

## 2021-11-23 ENCOUNTER — VIRTUAL VISIT (OUTPATIENT)
Dept: FAMILY MEDICINE | Facility: CLINIC | Age: 21
End: 2021-11-23
Payer: COMMERCIAL

## 2021-11-23 DIAGNOSIS — L98.9 SKIN LESION: ICD-10-CM

## 2021-11-23 DIAGNOSIS — R63.5 ABNORMAL WEIGHT GAIN: ICD-10-CM

## 2021-11-23 DIAGNOSIS — K59.00 CONSTIPATION, UNSPECIFIED CONSTIPATION TYPE: ICD-10-CM

## 2021-11-23 DIAGNOSIS — Z71.3 NUTRITIONAL COUNSELING: Primary | ICD-10-CM

## 2021-11-23 DIAGNOSIS — R10.84 ABDOMINAL PAIN, GENERALIZED: ICD-10-CM

## 2021-11-23 DIAGNOSIS — D64.9 ANEMIA, UNSPECIFIED TYPE: Primary | ICD-10-CM

## 2021-11-23 PROCEDURE — 97802 MEDICAL NUTRITION INDIV IN: CPT | Mod: 95 | Performed by: DIETITIAN, REGISTERED

## 2021-11-23 RX ORDER — OMEPRAZOLE 40 MG/1
40 CAPSULE, DELAYED RELEASE ORAL DAILY
Qty: 90 CAPSULE | Refills: 1 | Status: SHIPPED | OUTPATIENT
Start: 2021-11-23 | End: 2022-04-28

## 2021-11-23 ASSESSMENT — ANXIETY QUESTIONNAIRES
3. WORRYING TOO MUCH ABOUT DIFFERENT THINGS: MORE THAN HALF THE DAYS
GAD7 TOTAL SCORE: 14
1. FEELING NERVOUS, ANXIOUS, OR ON EDGE: MORE THAN HALF THE DAYS
5. BEING SO RESTLESS THAT IT IS HARD TO SIT STILL: MORE THAN HALF THE DAYS
6. BECOMING EASILY ANNOYED OR IRRITABLE: MORE THAN HALF THE DAYS
IF YOU CHECKED OFF ANY PROBLEMS ON THIS QUESTIONNAIRE, HOW DIFFICULT HAVE THESE PROBLEMS MADE IT FOR YOU TO DO YOUR WORK, TAKE CARE OF THINGS AT HOME, OR GET ALONG WITH OTHER PEOPLE: SOMEWHAT DIFFICULT
7. FEELING AFRAID AS IF SOMETHING AWFUL MIGHT HAPPEN: MORE THAN HALF THE DAYS
2. NOT BEING ABLE TO STOP OR CONTROL WORRYING: MORE THAN HALF THE DAYS

## 2021-11-23 ASSESSMENT — PATIENT HEALTH QUESTIONNAIRE - PHQ9: 5. POOR APPETITE OR OVEREATING: MORE THAN HALF THE DAYS

## 2021-11-23 NOTE — LETTER
"11/23/2021       RE: Remy Holloway  [address]  [facility] [address]     Dear Colleague,    Thank you for referring your patient, Remy Holloway, to the [facility] WEIGHT MANAGEMENT [facility]INIC [facility]. Please see a copy of my visit note below.    Remy Holloway is a 21 year old adult who is being evaluated via a billable video visit.      The patient has been notified of following:     \"This video visit will be conducted via a call between you and your physician/provider. We have found that certain health care needs can be provided without the need for an in-person physical exam.  This service lets us provide the care you need with a video conversation.  If a prescription is necessary we can send it directly to your pharmacy.  If lab work is needed we can place an order for that and you can then stop by our lab to have the test done at a later time.    Video visits are billed at different rates depending on your insurance coverage.  Please reach out to your insurance provider with any questions.    If during the course of the call the physician/provider feels a video visit is not appropriate, you will not be charged for this service.\"    Patient has given verbal consent for Video visit? Yes  How would you like to obtain your AVS? MyChart  Will anyone else be joining your video visit? No  {If patient encounters technical issues they should call [phone]    New Weight Management Nutrition Consultation    Remy Holloway is a 21 year old adult presents today for new weight management nutrition consultation. Patient presents today accompanied by parents. Patient referred by Dr. Mayo on November 22, 2021.    Patient with Co-morbidities of obesity including:  none     Anthropometrics:  Estimated body mass index is 26.08 kg/m  as calculated from the following:    Height as of 11/22/21: 1.803 m (5' 11\").    Weight as of " "11/22/21: 84.8 kg (187 lb).     Current weight (per home scale): 184 lbs     Wt Readings from Last 15 Encounters:   11/22/21 84.8 kg (187 lb)   05/17/21 93 kg (205 lb)   12/03/19 94.2 kg (207 lb 12 oz)   11/19/19 95.3 kg (210 lb)   08/05/19 89.6 kg (197 lb 8 oz)   03/13/19 97.1 kg (214 lb)   02/20/19 95.8 kg (211 lb 3.2 oz)   01/24/19 95.7 kg (211 lb) (95 %, Z= 1.65)*   06/11/18 89.4 kg (197 lb 1.3 oz) (92 %, Z= 1.38)*   04/19/18 88 kg (194 lb) (91 %, Z= 1.32)*   01/30/18 81.6 kg (180 lb) (83 %, Z= 0.96)*   01/29/18 83 kg (183 lb 0.6 oz) (85 %, Z= 1.05)*   08/18/17 83.5 kg (184 lb 1.9 oz) (87 %, Z= 1.13)*   04/11/17 87.8 kg (193 lb 8 oz) (92 %, Z= 1.41)*   05/04/15 78.5 kg (173 lb) (90 %, Z= 1.27)*     * Growth percentiles are based on [facility] (Boys, 2-20 Years) data.     Medications for Weight Loss:  none    NUTRITION HISTORY  See MD note for details.  Per questionnaire: \"medications for bi-polar & depression make weight and appetite variable.\"  Strong appetite at night after taking seroquel. Will eat a couple sandwiches + fruit at 9pm if taken meds at 8:30pm. Going to bed around 10 pm.   Does not eat dairy, avoids gluten/wheat.   Summer camp, kept Kosher, structured meals, lost 20-30 lbs. Camp ended August, was 180 lbs, starting to creep back up.    Goes to a day program 4 days per week, brining lunch.   Drinking coffee through the day. Drinking a lot of water.   Mom reports pt with h/o iron deficiency anemia.     Recent food recall:  If you do eat breakfast, what types of food do you eat? Overnight oatmeal with raisin sometimes skipping, not feeling very hungry in the am.   Do you typically eat lunch? Yes   If you do eat lunch, what types of food do you typically eat?  Turkey sandwich, yogurt, pear, carrot sticks/cucumbers + seltzer water     Do you typically eat supper? Yes   If you do eat supper, what types of food do you typically eat? meat, veggies, fruit, and sometimes potato or GF pasta or rice; protein + " "cooked veggie/salad + starch (rice). 1 plate.      Do you typically eat snacks? Yes   If you do snack, what types of food do you typically eat? sandwiches, peanut butter & jelly or turkey       Physical Activity:  Working out with a  1-2 times per week. Walking half mile to coffee shop.     Nutrition Prescription  Recommended energy/nutrient modification.    Nutrition Diagnosis  Overweight r/t long history of positive energy balance aeb BMI >25.    Nutrition Intervention  Materials/education provided on Volumetric eating to help satiety level on fewer calories; portion control and healthy food choices (Plate Method and Volumetrics handouts), 100 calorie snack choices, meal and snack planning and websites, and mindful eating habits. Pt's Mom inquiring about foods to help bring up iron levels, provided education on high-iron foods and effect calcium, vitamin C and caffeine have on iron absorption. Encouraged pt to talk with psychiatrist about timing of medication, if possible to take right before bed. Patient and parents demonstrates understanding.     Expected Engagement: good    The Plate Method  http://www.[email or web address]/066701tm.pdf    Protein Sources   http://LinkCycle/214438.pdf     Carbohydrates  [email or web address]://fvfiles.com/431635.pdf     Mindful Eating  http://LinkCycle/608175.pdf     Summary of Volumetrics Eating Plan  [email or web address]://[email or web address]     Healthy Recipe Resources:  \"The Volumetrics Eating Plan\" by Lida Fuentes, Ph.D.  https://www.Openbuilds.Cignis/  www.Eons  www.oldDPSIpt.org  Dash Diet Recipes [facility]  www.extension.[facility] - the recipe box  \"Cooking that Counts\" by editors of TurnTide  https://www.[facility]/communityculinary/[facility]_Cookbook_KT_Final_11-6_NoCrops-compressed.pdf  Https://www.choosemyplate.gov/myplatekitchen  https://snaped.fns.usda.gov/recipes-menus - SNAP recipes  https://www.diabetesfoodhub.org/all-recipes.html  https://www.Edamam.com/    High-iron " foods:  100% iron-fortified ready-to-eat cereal   cup, 18 mg  Grits, instant   cup, 7.1   Cream of wheat   cup, 5.2   Oatmeal, instant   cup, 5 mg  Soybeans, cooked   cup, 4.4 mg  White beans, canned   cup, 3.9 mg   Lentils   cup, 3.3 mg  White rice 1/3 cup, 3 mg  Spinach   cup cooked, 1 cup raw, 3 mg   Beef tenderloin 3 oz, 3 mg  Baked beans 1/3 cup, 3 mg  Vegetable or soy burger 1 clyde, 2.9 mg  Soy milk 1 cup (8 oz), 2.7 mg   Chickpeas   cup, 2.5 mg  Kidney beans   cup, 2.5 mg  Sardines 3 oz, 2.5 mg   Nuts: almonds or pistachios   cup, 1.3 mg   [place] sprouts, cooked   cup, 1 mg   Egg 1 whole, 1 mg      Follow-Up:  PRN    Time spent with patient: 28 minutes.          Again, thank you for allowing me to participate in the care of your patient.      Sincerely,    Nhi Perez RD

## 2021-11-23 NOTE — NURSING NOTE
21 year old  Chief Complaint   Patient presents with     Results     Low iron levels       There were no vitals taken for this visit. There is no height or weight on file to calculate BMI.  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)     Gluten-sensitive enteropathy     Gluten intolerance     Seasonal allergic rhinitis     Mild intermittent asthma without complication     Gender dysphoria, unspecified     Bipolar 1 disorder (H)       Wt Readings from Last 2 Encounters:   11/22/21 84.8 kg (187 lb)   05/17/21 93 kg (205 lb)     BP Readings from Last 3 Encounters:   05/17/21 110/72   12/03/19 117/74   11/19/19 122/57         Current Outpatient Medications   Medication     acetylcysteine (N-ACETYL CYSTEINE) 600 MG CAPS capsule     clonazePAM (KLONOPIN) 1 MG tablet     divalproex sodium extended-release (DEPAKOTE ER) 500 MG 24 hr tablet     lithium (ESKALITH CR/LITHOBID) 450 MG CR tablet     loratadine (CLARITIN) 10 MG capsule     lurasidone (LATUDA) 80 MG TABS tablet     melatonin 3 MG tablet     OLANZapine (ZYPREXA) 5 MG tablet     QUEtiapine (SEROQUEL) 50 MG tablet     metFORMIN (GLUCOPHAGE) 1000 MG tablet     polyethylene glycol (MIRALAX/GLYCOLAX) powder     QUEtiapine (SEROQUEL) 400 MG tablet     No current facility-administered medications for this visit.       Social History     Tobacco Use     Smoking status: Never Smoker     Smokeless tobacco: Never Used   Substance Use Topics     Alcohol use: No     Drug use: No       Health Maintenance Due    Topic Date Due     ADVANCE CARE PLANNING  Never done     Pneumococcal Vaccine: Pediatrics (0 to 5 Years) and At-Risk Patients (6 to 64 Years) (1 of 2 - PPSV23) Never done     HEPATITIS C SCREENING  Never done     MENTAL HEALTH TX PLAN  12/25/2019     PREVENTIVE CARE VISIT  01/24/2020     ASTHMA CONTROL TEST  06/03/2020     ASTHMA ACTION PLAN  12/03/2020     INFLUENZA VACCINE (1) 09/01/2021       No results found for: PAP      November 23, 2021 9:46 AM

## 2021-11-23 NOTE — PATIENT INSTRUCTIONS
"Hi Angella,    Follow-up with RD as needed.     Thank you,    Nhi Perez, RD, LD  If you would like to schedule or reschedule an appointment with the RD, please call [phone]    Nutrition Resources  The Plate Method  http://www.Persimmon Technologies/093713ah.pdf    Protein Sources   http://Persimmon Technologies/878400.pdf     Carbohydrates  http://fvfiles.com/338970.pdf     Mindful Eating  http://Persimmon Technologies/901591.pdf     Summary of Volumetrics Eating Plan  http://[email or web address].[email or web address].pdf     Healthy Recipe Resources:  \"The Volumetrics Eating Plan\" by Lida Fuentes, Ph.D.  https://www.The Daily Caller.Medgenics/  www.Crispy Gamer  www.Yabidu.Woozworld  Dash Diet Recipes [facility]  www.extension.[facility] - the recipe box  \"Cooking that Counts\" by editors of [employer]  https://www.[facility]/communityculinary/[facility]_Cookbook_KT_Final_11-6_NoCrops-compressed.pdf  Https://www.choosemyplate.gov/myplatekitchen  https://snaped.fns.usda.gov/recipes-menus - SNAP recipes  https://www.diabetesfoodhub.org/all-recipes.html  https://www.[employer].com/    High-iron foods:  100% iron-fortified ready-to-eat cereal   cup, 18 mg  Grits, instant   cup, 7.1   Cream of wheat   cup, 5.2   Oatmeal, instant   cup, 5 mg  Soybeans, cooked   cup, 4.4 mg  White beans, canned   cup, 3.9 mg   Lentils   cup, 3.3 mg  White rice 1/3 cup, 3 mg  Spinach   cup cooked, 1 cup raw, 3 mg   Beef tenderloin 3 oz, 3 mg  Baked beans 1/3 cup, 3 mg  Vegetable or soy burger 1 clyde, 2.9 mg  Soy milk 1 cup (8 oz), 2.7 mg   Chickpeas   cup, 2.5 mg  Kidney beans   cup, 2.5 mg  Sardines 3 oz, 2.5 mg   Nuts: almonds or pistachios   cup, 1.3 mg   [place] sprouts, cooked   cup, 1 mg   Egg 1 whole, 1 mg      Interested in working with a health ?  Health coaches work with you to improve your overall health and wellbeing.  They look at the whole person, and may involve discussion of different areas of life, including, but not limited to the four pillars of health (sleep, exercise, nutrition, " and stress management). Discuss with your care team if you would like to start working a health .    Health Coaching-3 Pack:    $99 for three health coaching visits    Visits may be done in person or via phone    Coaching is a partnership between the  and the client; Coaches do not prescribe or diagnose    Coaching helps inspire the client to reach his/her personal goals      COMPREHENSIVE WEIGHT MANAGEMENT PROGRAM  VIRTUAL SUPPORT GROUPS    For Support Group Information:      We offer support groups for patients who are working on weight loss and considering, preparing for or have had weight loss surgery.   There is no cost for this opportunity.  You are invited to attend the?Virtual Support Groups?provided by any of the following locations:    1. [facility] via Microsoft Teams with Josselin Duggan RN  2.   [place] via The 19th Floor with Mehdi Giraldo, PhD, LP  3.   [place] via The 19th Floor with Sophie Dent RN  4.   [facility] via Microsoft Teams with Sophie Cavazos [facility]    The following Support Group information can also be found on our website:  [email or web address]://[email or web address]      [facility] Weight Loss Surgery Support Group    [facility] Weight Loss Surgery Support Group  The support group is a patient-lead forum that meets monthly to share experiences, encouragement and education. It is open to those who have had weight loss surgery, are scheduled for surgery, and those who are considering surgery.   WHEN: This group meets on the 3rd Wednesday of each month from 5:00PM - 6:00PM virtually using Microsoft Teams.   FACILITATOR: Led by Josselin Duggan, the program's Clinical Coordinator.   TO REGISTER: Please contact the clinic via VipVenta or call the nurse line directly at [phone] to inform our staff that you would like an invite sent to you and to let us know the email you would like the invite sent to. Prior to  "the meeting, a link with directions on how to join the meeting will be sent to you.    2021 Meetings  August 18: \"Let's Talk\" a time for the group to share.  September 15: \"Let's Talk\" a time for the group to share.  October 20: \"Let's Talk\" a time for the group to share.  November 17: Elizabeth Contreras RD, TIM \"Protein, Metabolism and Meal Planning\"  December 15: Edison Manriquez RD, TIM will speak, \"Recipe Modification\"    [facility] Support Groups    Connections: Bariatric Care Support Group?  This is open to all [facility] (and those external to this program) pre- and post- operative bariatric surgery patients as well as their support system.   WHEN: This group meets the 2nd Tuesday of each month from 6:30 PM - 8:00 PM virtually using Microsoft Teams.   FACILITATOR: Led by Mehdi Giraldo, Ph.D who is a Licensed Psychologist with the [facility] Comprehensive Weight Management Program.   TO REGISTER: Please send an email to Mehdi Giraldo, Ph.D., LP at?[email or web address]?if you would like an invitation to the group and to learn about using Microsoft Teams.    2021 Meetings  August 10: Open Forum  September 14: Guest Speaker: Sophie Dent RN,CBN, [facility], [facility] Comprehensive Weight Management Program, \"Your Hospital Stay and Post-Operative Compliance\"  October 12: Open Forum  November 9: Guest Speaker: Arabella Petersen RD,TIM, [facility] Comprehensive Weight Management Program,\"Holiday Eating\".  December 14: Guest Speaker Lizeth Woods MD, MPH, [facility], [facility], \"Body Contouring Surgery for Bariatric Surgery Patients\"     Connections: Post-Operative Bariatric Surgery Support Group  This is a support group for [facility] bariatric patients (and those external to [facility]) who have had bariatric surgery and are at least 3 months post-surgery.  WHEN: This support group meets the 4th Wednesday of the month from 11:00 " AM - 12:00 PM virtually using Microsoft Teams.   FACILITATOR: Led by Certified Bariatric Nurse, Sophie Dent RN.   TO REGISTER: Please send an email to Sophie at [email or web address] if you would like an invitation to the group and to learn about using Barnebys.      [facility] Healthy Lifestyle Virtual Support Group    Healthy Lifestyle Virtual Support Group?  This is 60 minutes of small group guided discussion, support and resources. All are welcome who want a healthy lifestyle.  WHEN: This group meets monthly on a Friday from 12:30 PM - to 1:30 PM virtually using Microsoft Teams.   FACILITATOR: Led by National Board Certified Health , Sophie Cavazos, [facility].   TO REGISTER: Please send an email to Sophie [email or web address] to receive monthly invites to the group or if you have any questions about having a health .  Prior to the meeting, a link with directions on how to join the meeting will be sent to you.    2021 Meetings  August 27: Open Forum  September 24: Sleep and the 7 Types of Rest  October 29: Open Forum  November 19: Gratitude     December 10: Open Forum

## 2021-11-23 NOTE — PROGRESS NOTES
"Seema Castañeda is a 21 year old who is being evaluated via a billable video visit.      How would you like to obtain your AVS? {AVS Preference:141923}  If the video visit is dropped, the invitation should be resent by: {video visit invitation:405754}  Will anyone else be joining your video visit? {:583882}  {If patient encounters technical issues they should call [phone] :340107}    Video Start Time: {video visit start/end time for provider to select:763941}    {PROVIDER CHARTING PREFERENCE:312343}    Subjective   Seema Castañeda is a 21 year old who presents for the following health issues {ACCOMPANIED BY STATEMENT (Optional):109547}    HPI   {ALERT  Recent PHQ-9 score indicates suicidal ideations :289889}  {SUPERLIST (Optional):733686}  {additonal problems for provider to add (Optional):613361}    Review of Systems   {ROS COMP (Optional):012043}      Objective    Vitals - Patient Reported  Systolic (Patient Reported): 136  Diastolic (Patient Reported): 85  Weight (Patient Reported): 83.8 kg (184 lb 11.2 oz)        Physical Exam   {video visit exam brief selected:900577::\"GENERAL: Healthy, alert and no distress\",\"EYES: Eyes grossly normal to inspection.  No discharge or erythema, or obvious scleral/conjunctival abnormalities.\",\"RESP: No audible wheeze, cough, or visible cyanosis.  No visible retractions or increased work of breathing.  \",\"SKIN: Visible skin clear. No significant rash, abnormal pigmentation or lesions.\",\"NEURO: Cranial nerves grossly intact.  Mentation and speech appropriate for age.\",\"PSYCH: Mentation appears normal, affect normal/bright, judgement and insight intact, normal speech and appearance well-groomed.\"}    {Diagnostic Test Results (Optional):429159}    {AMBULATORY ATTESTATION (Optional):269686}        Video-Visit Details    Type of service:  Video Visit    Video End Time:{video visit start/end time for provider to select:601953}    Originating Location (pt. Location): {video visit " "patient location:742031::\"Home\"}    Distant Location (provider location):  [facility]     Platform used for Video Visit: {Virtual Visit Platforms:143898::\"Barre\"}  "

## 2021-11-23 NOTE — PROGRESS NOTES
Seema (They Them) is a 21 year old who is being evaluated via a billable video visit.      How would you like to obtain your AVS? MyChart  If the video visit is dropped, the invitation should be resent by: Send to e-mail at: [email or web address]  Will anyone else be joining your video visit? Yes, parents      Video Start Time: 9:52 AM   Video End Time: 10:32 AM  Total video time: 40 minutes    Assessment & Plan     (D64.9) Anemia, unspecified type  (primary encounter diagnosis)  Comment: new onset anemia with low circulating iron, normal ferritin. Longstanding abdominal pain. DDx gastritis, hemorrhoids, diverticulosis.   Plan: Start omeprazole 40 mg each morning. Cut back on caffeine consumption. Recheck numbers in 6 weeks.     (R10.84) Abdominal pain, generalized  Comment: suspect gastritis  Plan: omeprazole (PRILOSEC) 40 MG DR capsule        As above.     (K59.00) Constipation, unspecified constipation type  Comment: history of constipation, using miralax prn  Plan: titrate to effect, high fiber diet.     (L98.9) Skin lesion  Comment: concern for changing mole on back  Plan: will send dermatology referral via My chart.    RTC in January 2022 for recheck of anemia, abdominal symptoms.       Lana Faustin MD  Internal Medicine/Pediatrics      I, Meenu Alarcon, am serving as a scribe to document services personally performed by Dr. Lana Faustin, based on data collection and the provider's statements to me. Dr. Faustin has reviewed, edited, and approved the above note.       Subjective   Seema is a 21 year old adult who presents for the following health issues. They are accompanied by their parents, Ed and Daisha.     HPI     Anemia  Seema follows with Dr. Reina Rose in psychiatry at [facility] and recently had labs completed on 11/13/21. These results showed a low iron level with normal TIBC, possible iron deficiency. Hemoglobin was slightly decreased at 13.2, blood counts normal. Seema's mother noted  "that Seema is always tired, always reaching for more caffeine\". Noted that Seema's diet includes meats like turkey and chicken.     Today, Seema reports that they eat \"a lot\" of meat, lots of fruits and vegetables. Upon review of past lab work, hemoglobin was on the lower end of normal on 4/9/21. No significant epistaxis or gum bleeding. Unsure if this could be related to abdominal symptoms, as below. Not taking ibuprofen, occasional Tylenol.      GI concerns, abdominal pain  Seema states that they have been having a lot of abdominal pain but this is \"lifelong\", no recent worsening or onset. Pain is throughout the abdomen, not sure if anything helps relieve it. Usually able to have a BM daily but does not always feel like they are completely emptying bowel, endorses occasional constipation. Used Miralax for a while and this helped the constipation but not the abdominal pain. Feels like drinking water is helpful for constipation. No current medications or supplements. Seema stopped eating dairy or gluten products around July 2021 but this has not seemed to help with the pain. Seema's mom reports that Seema has been complaining less about stomach pain, but not sure if this means that pain has improved. No melena. Infrequent bleeding due to hemorrhoids. No ETOH. Has 2-3 shots of espresso in the morning and 1 shot of decaf espresso in the afternoon, multiple cups of coffee during daily program but amount of this is unclear. Frequent intake of carbonated water.     Seema will sometimes choke on their food if they do not drink water with meals. They have been doing this daily for a \"long time\". Reports that food will get \"stuck\" higher or towards the middle of their chest. Happens throughout the day. Seema states that they take large bites as they are \"so hungry\". Endorses some burping, does not feel like they have any acid coming up during the night.      Weight loss  Concerted weight loss of about 20-25 pounds, Seema's " "mom attributes this to eliminating dairy and gluten. Seema reports that food do not seem appetizing in the morning due to abdominal pain. \"Huge\" appetite at night, may be due to medication.     Wt Readings from Last 4 Encounters:   11/22/21 84.8 kg (187 lb)   05/17/21 93 kg (205 lb)   12/03/19 94.2 kg (207 lb 12 oz)   11/19/19 95.3 kg (210 lb)       Concerns about urination, fluid intake  Seema's dad reports that Seema has said that they are \"constantly\" going to the bathroom, has noted that they feel like urination is excessive. Seema reports feeling \"really, really thirsty\". A1c was 4.2% on 9/9/21.  Drinking 2-3 full bottles of water, about 16 oz each. Also having multiple glasses of water daily. Not logging daily fluid intake. Parents think that medications may be contributing to increased thirst.       Skin lesion  Daisha reports that Angella has a \"suspicious\" raised, multicolored mole on their back. Requesting referral to dermatology.       Review of Systems   Constitutional, HEENT, cardiovascular, pulmonary, gi and gu systems are negative, except as otherwise noted.      Objective    Vitals - Patient Reported  Systolic (Patient Reported): 136  Diastolic (Patient Reported): 85  Weight (Patient Reported): 83.8 kg (184 lb 11.2 oz)      Vitals:  No vitals were obtained today due to virtual visit.    Physical Exam   GENERAL: Healthy, alert and no distress  EYES: Eyes grossly normal to inspection.  No discharge or erythema, or obvious scleral/conjunctival abnormalities.  RESP: No audible wheeze, cough, or visible cyanosis.  No visible retractions or increased work of breathing.    SKIN: Visible skin clear. No significant rash, abnormal pigmentation or lesions.  NEURO: Cranial nerves grossly intact.  Mentation and speech appropriate for age.  PSYCH: Mentation appears normal, affect normal/bright, judgement and insight intact, normal speech and appearance well-groomed.        Video-Visit Details    Type of service:  Video " Visit    Video End Time:10:32 AM    Originating Location (pt. Location): Home    Distant Location (provider location):  [facility]     Platform used for Video Visit: Annia

## 2021-11-24 ASSESSMENT — ANXIETY QUESTIONNAIRES: GAD7 TOTAL SCORE: 14

## 2021-11-24 ASSESSMENT — ASTHMA QUESTIONNAIRES: ACT_TOTALSCORE: 24

## 2021-11-24 ASSESSMENT — PATIENT HEALTH QUESTIONNAIRE - PHQ9: SUM OF ALL RESPONSES TO PHQ QUESTIONS 1-9: 23

## 2021-12-08 ENCOUNTER — TELEPHONE (OUTPATIENT)
Dept: FAMILY MEDICINE | Facility: CLINIC | Age: 21
End: 2021-12-08
Payer: COMMERCIAL

## 2021-12-08 NOTE — TELEPHONE ENCOUNTER
----- Message from Lana Faustin MD sent at 12/7/2021 11:25 PM CST -----  Regarding: needs 40 min in person appt in Jan 2022  Please call parents of Seema.  They need 40 min appt with me in Jan 2022 for follow up anemia, abdominal pain and labs.  In person appt at Clinic.    Thanks  They are expecting your call    Lana

## 2021-12-26 ENCOUNTER — HEALTH MAINTENANCE LETTER (OUTPATIENT)
Age: 21
End: 2021-12-26

## 2022-03-17 ENCOUNTER — OFFICE VISIT (OUTPATIENT)
Dept: DERMATOLOGY | Facility: CLINIC | Age: 22
End: 2022-03-17
Payer: COMMERCIAL

## 2022-03-17 DIAGNOSIS — L98.9 SKIN LESION: ICD-10-CM

## 2022-03-17 DIAGNOSIS — D18.01 CHERRY ANGIOMA: ICD-10-CM

## 2022-03-17 DIAGNOSIS — L30.9 CHRONIC DERMATITIS OF HANDS: Primary | ICD-10-CM

## 2022-03-17 PROCEDURE — 99203 OFFICE O/P NEW LOW 30 MIN: CPT | Mod: GC

## 2022-03-17 RX ORDER — CLOBETASOL PROPIONATE 0.5 MG/G
OINTMENT TOPICAL
Qty: 45 G | Refills: 1 | Status: SHIPPED | OUTPATIENT
Start: 2022-03-17 | End: 2024-01-16

## 2022-03-17 ASSESSMENT — PAIN SCALES - GENERAL: PAINLEVEL: NO PAIN (0)

## 2022-03-17 NOTE — NURSING NOTE
Dermatology Rooming Note    Remy Holloway's goals for this visit include:   Chief Complaint   Patient presents with     Derm Problem     Seema is here today for a spot of concern on the back     Adore Costa, EMT

## 2022-03-17 NOTE — LETTER
3/17/2022       RE: Remy Holloway  [address]  [place] [address]     Dear Colleague,    Thank you for referring your patient, Remy Holloway, to the [facility] [place] [facility]. Please see a copy of my visit note below.    [facility] Dermatology Note  Encounter Date: Mar 17, 2022  Date of Last Dermatology Office Visit: Visit date not found **N/A (Patient new to dermatology clinic)    Dermatology Problem List:  *GOES BY IAN; PRONOUNS ARE THEY/THEM*  # Dermatitis of the hands - moderate/severe  Current tx: clobetasol x 1 month starting 3/17/2022, aggressive moisturize  # Angioma, right upper back  ____________________________________________    Assessment & Plan:     # Dermatitis of the hands   Within the context of frequent hand washing and moisturizing. Discussed with patient etiology and natural history of condition, and explained that use of topical steroids to control inflammation, to be supplemented by aggressive moisturization, would be the recommended course of action.   -Start clobetasol BID for 4-6 weeks.   -Use Eucerin cream or other equivalent moisturizer with every hand washing.   -Consider cotton glove application at night for clobetasol and or moisturizer.     # Angioma  Without concerning signs on dermoscopy. Possibly in regression.   - No further treatment indicated at this time.     Procedures Performed: None    Follow-up: 6 week(s) virtually (telephone with photos), or earlier for new or changing lesions    Staff and Resident:     Staff: Dr. Carolina Walters MD  PGY-2, Internal Medicine-Dermatology  Pager: TextPage Link    Patient was seen and examined with the medicine/dermatology resident. I agree with the history, review of systems, physical examination, assessments and plan.    Carolina Mora MD  Professor and  Chair  Department of  Dermatology  [facility]  ____________________________________________    CC: Derm Problem (Seema is here today for a spot of concern on the back)      HPI:  Ms. Remy Holloway is a(n) 22 year old adult who presents today as a new patient (or re-establishing care following two year hiatus) for the following chief complaint(s): Derm Problem (Seema is here today for a spot of concern on the back)    Duration of problem: Not sure. Noticed by mother some time ago.   Primary location(s) affected: Back, single spot.   Notes: Describes it occasionally being itchy.     Of note, patient presents with their month, who assists in providing history.     -Denies personal or immediate family history of skin cancer.   -Denies occasional (though not frequent) history of peeling burns in childhood/adolescence.   -Denies any smoking or other tobacco use history.   -Denies any history of tanning bed use.     -After skin exam, the topic of dermatitis was brought up.   -Washes hands approximately 10 times a day (mother thinks much more frequently)  -Notes that lotion stings when he applies it to his hands.     Patient is otherwise feeling well, without additional skin concerns.    Labs Reviewed:  N/A    Physical Exam:  Vitals: There were no vitals taken for this visit.  General: Well developed adult. Resting comfortably; no acute distress. Conversational and cooperative with exam.  HEENT: Anicteric sclera. EOMI. Mucous membranes moist.   Skin Exam:   - Whitney Type I: Sun-occluded skin with minimal background pigmentation. Slightly pink..   - Patient with over 100 nevi  - At the upper right shoulder is a 5 mm cerebriform vascular papule. Nontender to palpation.   - At the dorsum of the hands is noted lichenification and erythema in a glove-like distribution that extends to the wrists. Particularly affected are the MCP joints of the 4th and 5th digits, where there is observed cracking and bleeding. Slight  lichenification additionally appreciated at the palmar aspect of the hands.   - Pink irregular eczematous patches at the upper arms.   - No other lesions of concern on areas examined.   Psych: Reserved, however with appropriate mood and affect for situation.     Medications:  Current Outpatient Medications   Medication     acetylcysteine (N-ACETYL CYSTEINE) 600 MG CAPS capsule     clonazePAM (KLONOPIN) 1 MG tablet     divalproex sodium extended-release (DEPAKOTE ER) 500 MG 24 hr tablet     lithium (ESKALITH CR/LITHOBID) 450 MG CR tablet     loratadine (CLARITIN) 10 MG capsule     lurasidone (LATUDA) 80 MG TABS tablet     melatonin 3 MG tablet     OLANZapine (ZYPREXA) 5 MG tablet     omeprazole (PRILOSEC) 40 MG DR capsule     QUEtiapine (SEROQUEL) 50 MG tablet     No current facility-administered medications for this visit.        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)     Gluten-sensitive enteropathy     Gluten intolerance     Seasonal allergic rhinitis     Mild intermittent asthma without complication     Gender dysphoria, unspecified     Bipolar 1 disorder (H)     Past Medical History:   Diagnosis Date     ADHD      Amblyopia, unspecified      Bipolar 1 disorder (H)      Esotropia, unspecified      Gender dysphoria      Lack of normal physiological development, unspecified     Normal chromosomes, nl MRI, neg fragile X     Learning disabilities      Redundant prepuce and phimosis     Penile coronal adhesions-repaired      Umbilical hernia without mention of obstruction or gangrene     repaired     Unspecified otitis media     Recurrent       CC Lana Faustin MD  [address] [place] [phone] on close of this encounter.

## 2022-03-17 NOTE — PROGRESS NOTES
[facility] Dermatology Note  Encounter Date: Mar 17, 2022  Date of Last Dermatology Office Visit: Visit date not found **N/A (Patient new to dermatology clinic)    Dermatology Problem List:  *GOES BY IAN; PRONOUNS ARE THEY/THEM*  # Dermatitis of the hands - moderate/severe  Current tx: clobetasol x 1 month starting 3/17/2022, aggressive moisturize  # Angioma, right upper back  ____________________________________________    Assessment & Plan:     # Dermatitis of the hands   Within the context of frequent hand washing and moisturizing. Discussed with patient etiology and natural history of condition, and explained that use of topical steroids to control inflammation, to be supplemented by aggressive moisturization, would be the recommended course of action.   -Start clobetasol BID for 4-6 weeks.   -Use Eucerin cream or other equivalent moisturizer with every hand washing.   -Consider cotton glove application at night for clobetasol and or moisturizer.     # Angioma  Without concerning signs on dermoscopy. Possibly in regression.   - No further treatment indicated at this time.     Procedures Performed: None    Follow-up: 6 week(s) virtually (telephone with photos), or earlier for new or changing lesions    Staff and Resident:     Staff: Dr. Carolina Walters MD  PGY-2, Internal Medicine-Dermatology  Pager: TextPage Link    Patient was seen and examined with the medicine/dermatology resident. I agree with the history, review of systems, physical examination, assessments and plan.    Carolina Mora MD  Professor and  Chair  Department of Dermatology  [facility]  ____________________________________________    CC: Derm Problem (Ian is here today for a spot of concern on the back)      HPI:  Ms. Remy Holloway is a(n) 22 year old adult who presents today as a new patient (or re-establishing care following two year hiatus) for the following chief  complaint(s): Derm Problem (Seema is here today for a spot of concern on the back)    Duration of problem: Not sure. Noticed by mother some time ago.   Primary location(s) affected: Back, single spot.   Notes: Describes it occasionally being itchy.     Of note, patient presents with their month, who assists in providing history.     -Denies personal or immediate family history of skin cancer.   -Denies occasional (though not frequent) history of peeling burns in childhood/adolescence.   -Denies any smoking or other tobacco use history.   -Denies any history of tanning bed use.     -After skin exam, the topic of dermatitis was brought up.   -Washes hands approximately 10 times a day (mother thinks much more frequently)  -Notes that lotion stings when he applies it to his hands.     Patient is otherwise feeling well, without additional skin concerns.    Labs Reviewed:  N/A    Physical Exam:  Vitals: There were no vitals taken for this visit.  General: Well developed adult. Resting comfortably; no acute distress. Conversational and cooperative with exam.  HEENT: Anicteric sclera. EOMI. Mucous membranes moist.   Skin Exam:   - Whitney Type I: Sun-occluded skin with minimal background pigmentation. Slightly pink..   - Patient with over 100 nevi  - At the upper right shoulder is a 5 mm cerebriform vascular papule. Nontender to palpation.   - At the dorsum of the hands is noted lichenification and erythema in a glove-like distribution that extends to the wrists. Particularly affected are the MCP joints of the 4th and 5th digits, where there is observed cracking and bleeding. Slight lichenification additionally appreciated at the palmar aspect of the hands.   - Pink irregular eczematous patches at the upper arms.   - No other lesions of concern on areas examined.   Psych: Reserved, however with appropriate mood and affect for situation.     Medications:  Current Outpatient Medications   Medication     acetylcysteine  (N-ACETYL CYSTEINE) 600 MG CAPS capsule     clonazePAM (KLONOPIN) 1 MG tablet     divalproex sodium extended-release (DEPAKOTE ER) 500 MG 24 hr tablet     lithium (ESKALITH CR/LITHOBID) 450 MG CR tablet     loratadine (CLARITIN) 10 MG capsule     lurasidone (LATUDA) 80 MG TABS tablet     melatonin 3 MG tablet     OLANZapine (ZYPREXA) 5 MG tablet     omeprazole (PRILOSEC) 40 MG DR capsule     QUEtiapine (SEROQUEL) 50 MG tablet     No current facility-administered medications for this visit.        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)     Gluten-sensitive enteropathy     Gluten intolerance     Seasonal allergic rhinitis     Mild intermittent asthma without complication     Gender dysphoria, unspecified     Bipolar 1 disorder (H)     Past Medical History:   Diagnosis Date     ADHD      Amblyopia, unspecified      Bipolar 1 disorder (H)      Esotropia, unspecified      Gender dysphoria      Lack of normal physiological development, unspecified     Normal chromosomes, nl MRI, neg fragile X     Learning disabilities      Redundant prepuce and phimosis     Penile coronal adhesions-repaired     Umbilical hernia without mention of obstruction or gangrene     repaired     Unspecified otitis media     Recurrent       CC Lana Faustin MD  [address]  [place] [address] on close of this encounter.

## 2022-03-17 NOTE — PATIENT INSTRUCTIONS
Moisturizers we can recommend:     Eucerin Advanced Repair Cream  Vanicream  CereVe    Gentle Skin Care Recommendations    Bathing   - limit showers to once daily, 15 minutes or less, with warm water   - use gentle soaps that are free of colors and scents and are not too strong of cleansers  - consider limiting soap to only the 'dirty' areas, like the armpits  and groin as much as possible to prevent stripping your skin in other areas of their natural moisture  - do not vigorously scrub when cleansing  - avoid any soaps with microbeads  - after showering, pat your skin gently dry with a towel  - make sure you are applying a good moisturizer after bathing every time (see below)  - do not take bubble baths as many of these products contain harsh chemicals that can irritate the skin    Examples of gentle cleansers:   - Mild bar soaps: Vanicream bar soap, Neutragena glycerin bar, Dove sensitive skin (fragrance-free)   - Mild liquid soaps: Vanicream liquid cleanser, Cerave liquid cleanser    Moisturizing     It is important to moisturize at least twice per day to the whole body. IMMEDIATELY after getting out of the shower, apply a moisturizer (preferably from the list below) to the entire body. If you have been prescribed any medications, apply those medications to the skin first and then you can apply the moisturizer on top.    Moisturizers come in a variety of types some of which are greasier, heavier, or lighter. The heaviest moisturizers are ointments, which are greasy like vaseline. The next best are creams, which are usually white and thick but absorb into the skin a little better. The lightest are lotions which are typically thin and contain more ingredients which can be irritating to your skin. For this reason, we do NOT recommend lotions.     Examples of good moisturizers:   - Ointments: vaseline, Cerave healing ointment, Vaniply, coconut oil   - Creams: Cerave, Vanicream, Neutrogena Norwegian Formula Hand Cream  "    Laundry     Be sure to use laundry products that are free of dyes and fragrances--many laundry products that claim to be fragrance-free actually are not free of these! Do not use laundry softeners or dryer sheets.     Good laundry products include:  - 7th generation Natural Laundry Detergent Liquid, 7th Generation Free and Clear Natural Laundry Detergent packs, 7th Generation Free and Clear natural 4x Concentrated Laundry Detergent, ECOS hypoallergenic laundry detergent, free & clear, ERA Ultra Era Free Liquid Laundry Detergent, All Free & Clear     Other Gentle Skin Recommendations    - Do not use products such as powders, perfumes, or colognes on your skin  - Avoid saunas and steam baths - these temperatures are too hot   - Avoid tight or  scratchy  clothing such as wool  - Always wash new clothing before wearing them for the first time  - Sometimes a humidifier or vaporizer, used at night can help the dry skin - but remember to keep it clean to void mold growth.  - Avoid applying essential oils to the skin or diffusing in the home (Many essential oils can be irritate to the skin!   - Keep in mind, just because something is \"natural\" it does not mean that it cannot irritate your skin (for example, poison ivy is natural, but will cause a painful rash!)      "

## 2022-04-28 DIAGNOSIS — R10.84 ABDOMINAL PAIN, GENERALIZED: ICD-10-CM

## 2022-04-28 RX ORDER — OMEPRAZOLE 40 MG/1
40 CAPSULE, DELAYED RELEASE ORAL DAILY
Qty: 90 CAPSULE | Refills: 1 | Status: CANCELLED | OUTPATIENT
Start: 2022-04-28

## 2022-04-28 RX ORDER — OMEPRAZOLE 40 MG/1
40 CAPSULE, DELAYED RELEASE ORAL DAILY
Qty: 90 CAPSULE | Refills: 0 | Status: SHIPPED | OUTPATIENT
Start: 2022-04-28 | End: 2022-05-04

## 2022-04-28 NOTE — TELEPHONE ENCOUNTER
Last visit 11/23/21, future visit 5/4/22  Prescription approved per [facility] Refill Protocol.  Shadia Mejia RN  [facility]

## 2022-05-03 NOTE — PROGRESS NOTES
"Seema Holloway is a 22 year old adult here for the following issues. They are accompanied by their mother.     Fatigue, low iron  I last met with Seema in November to review labs that have been drawn by their psychiatrist.  Hemoglobin was slightly decreased at 13.2 but they had a slightly low iron level.  Seeam's mother commented that Seema was always tired, always reaching for more caffeine. Seema reported eating \"a lot\" of meat, lots of fruits and vegetables.    Abdominal pain, dysphagia  At our last visit, Seema reported \"lifelong\" abdominal pain throughout the abdomen. They tend to have constipation so we discussed use of miralax and high fiber diet.  Seema stopped eating dairy and gluten products around July 2021, without significant change in abdominal symptoms. No melena. Infrequent bleeding due to hemorrhoids. Had been drinking 2-3 shots of espresso in the morning and 1 shot of decaf espresso in the afternoon, multiple cups of coffee during the day. Seema has since stopped drinking caffeine.     Seema previously reported that food was getting stuck in the upper chest, but did not feel acid in their throat. I recommended starting omeprazole 40mg daily. They continue to take it.        Abdominal pain is still present and is constant, mainly at the upper abdomen. Dysphagia symptoms have improved since our last visit. Constipation is also better, no longer using Miralax, having 1-2 BMs per day. Mom wonders if some of the medications that Seema is on could be contributing to chronic constipation.    Seema is sleeping OK \"for the most part\", going to bed around 9:30 PM. Occasionally waking up during the night due to hunger and will eat during this time. Getting regular exercise. No dyspnea of exercise intolerance.     Weight loss  At our last visit, Seema reported a concerted weight loss of about 20-25 pounds, Seema's mom attributed this to eliminating dairy and gluten. Seema stated that food does not seem " "appetizing in the morning due to abdominal pain. \"Huge\" appetite at night, may be due to medication.    Today, Seema reports that they have continued with a gluten and dairy free diet. They state that they are eating 1-2 meals per day, but Mom says 3 meals per day. Met with endocrinology (Dr. Mayo) and a nutritionist who suggested holding Seroquel until right before bed, and this has greatly helped with nighttime cravings.     Wt Readings from Last 4 Encounters:   05/04/22 78.5 kg (173 lb 0.6 oz)   11/22/21 84.8 kg (187 lb)   05/17/21 93 kg (205 lb)   12/03/19 94.2 kg (207 lb 12 oz)       Skin lesion  At our last visit, I referred Seema to dermatology for evaluation of a mole on their back. They report today that they saw dermatology at the [facility] and the provider was \"not worried about it\".        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 Use under gloves at night. 45 g 1     clonazePAM (KLONOPIN) 1 MG tablet 1 mg At Bedtime Take 0.5 -1 tablet in the morning, and may repeat one additional dose q day prn 90 " "tablet 1     divalproex sodium extended-release (DEPAKOTE ER) 500 MG 24 hr tablet Take 500 mg by mouth       lithium (ESKALITH CR/LITHOBID) 450 MG CR tablet 450 mg in AM and 900 mg in HS 90 tablet 0     loratadine 10 MG capsule Take 10 mg by mouth daily 30 capsule 3     lurasidone (LATUDA) 80 MG TABS tablet Take 160 mg by mouth daily 2 tabs at bedtime       melatonin 3 MG tablet Take 2-3 tablets at night 30 tablet 0     OLANZapine (ZYPREXA) 5 MG tablet Take 5-10 mg by mouth as needed       omeprazole (PRILOSEC) 40 MG DR capsule Take 1 capsule (40 mg) by mouth daily 90 capsule 0     QUEtiapine (SEROQUEL) 50 MG tablet 200 mg as needed Take 1-2 po TID prn 120 tablet 1       Allergies   Allergen Reactions     Dairy Digestive Other (See Comments)     Gluten Meal Other (See Comments)     No Known Allergies      Serotonin Reuptake Inhibitors (Ssris) [Serotonin Reuptake Inhibitors]      Other reaction(s): Psychosis  Manic with SSRI use        EXAM  /82   Pulse 82   Temp 98.1  F (36.7  C)   Ht 1.826 m (5' 11.89\")   Wt 78.5 kg (173 lb 0.6 oz)   SpO2 97%   BMI 23.54 kg/m    Gen: Alert, pleasant, NAD  COR: S1,S2, no murmur  Lungs: CTA bilaterally, no rhonchi, wheezes or rales  Abdomen: soft, no HSM or mass  Tenderness over the RUQ and midepigastric region, feels \"full\" over the LUQ and LLQ    Results for orders placed or performed in visit on 05/04/22   CBC with Platelets     Status: Normal   Result Value Ref Range    WBC Count 10.9 4.0 - 11.0 10e3/uL    RBC Count 4.43 3.80 - 5.90 10e6/uL    Hemoglobin 12.8 11.7 - 17.7 g/dL    Hematocrit 38.6 35.0 - 53.0 %    MCV 87 78 - 100 fL    MCH 28.9 26.5 - 33.0 pg    MCHC 33.2 31.5 - 36.5 g/dL    RDW 12.1 10.0 - 15.0 %    Platelet Count 334 150 - 450 10e3/uL    Narrative    The sex of this patient cannot be reliably determined based on discrepancies in demographics (legal sex, sex assigned at birth, gender identity).  Both male and female reference ranges are provided where " applicable.  Careful evaluation of the patient s results as compared to the gender specific reference intervals is required in this setting.    Ferritin     Status: Normal   Result Value Ref Range    Ferritin 56 12 - 388 ng/mL    Narrative    The sex of this patient cannot be reliably determined based on discrepancies in demographics (legal sex, sex assigned at birth, gender identity).  Both male and female reference ranges are provided where applicable.  Careful evaluation of the patient s results as compared to the gender specific reference intervals is required in this setting.    Iron and Iron Binding Capacity     Status: Abnormal   Result Value Ref Range    Iron 28 (L) 35 - 180 ug/dL    Iron Binding Capacity 318 240 - 430 ug/dL    Iron Sat Index 9 (L) 15 - 46 %       Assessment:  (K21.9) Gastroesophageal reflux disease without esophagitis  (primary encounter diagnosis)  Comment: continued abdominal pain despite use of omeprazole and eliminating caffeine, pain is constant and longstanding  Plan: CBC with Platelets, Ferritin, Helicobacter         pylori Antigen Stool       Stool test kit given, to test for H. pylori infection upon completion and return. Refills of omeprazole sent in today.   If H Pylori test is negative, then consider EGD. Plan discussed with Seema and mom today.    (K59.03) Drug-induced constipation  Comment: chronic constipation, not currently using Miralax  Plan: Recommend regular use of Miralax and fiber supplement to help with constipation.     23 minutes spend on the date of this encounter doing chart review, history and exam, documentation and further activities as noted above.     Lana Faustin MD  Internal Medicine/Pediatrics      I, Meenu Alarcon, am serving as a scribe to document services personally performed by Dr. Lana Faustin, based on data collection and the provider's statements to me. Dr. Faustin has reviewed, edited, and approved the above note.

## 2022-05-04 ENCOUNTER — OFFICE VISIT (OUTPATIENT)
Dept: FAMILY MEDICINE | Facility: CLINIC | Age: 22
End: 2022-05-04
Payer: COMMERCIAL

## 2022-05-04 VITALS
DIASTOLIC BLOOD PRESSURE: 82 MMHG | WEIGHT: 173.04 LBS | OXYGEN SATURATION: 97 % | TEMPERATURE: 98.1 F | SYSTOLIC BLOOD PRESSURE: 129 MMHG | HEART RATE: 82 BPM | HEIGHT: 72 IN | BODY MASS INDEX: 23.44 KG/M2

## 2022-05-04 DIAGNOSIS — E61.1 LOW SERUM IRON: ICD-10-CM

## 2022-05-04 DIAGNOSIS — K21.9 GASTROESOPHAGEAL REFLUX DISEASE WITHOUT ESOPHAGITIS: Primary | ICD-10-CM

## 2022-05-04 DIAGNOSIS — K59.03 DRUG-INDUCED CONSTIPATION: ICD-10-CM

## 2022-05-04 LAB
ERYTHROCYTE [DISTWIDTH] IN BLOOD BY AUTOMATED COUNT: 12.1 % (ref 10–15)
FERRITIN SERPL-MCNC: 56 NG/ML (ref 12–388)
HCT VFR BLD AUTO: 38.6 % (ref 35–53)
HGB BLD-MCNC: 12.8 G/DL (ref 11.7–17.7)
MCH RBC QN AUTO: 28.9 PG (ref 26.5–33)
MCHC RBC AUTO-ENTMCNC: 33.2 G/DL (ref 31.5–36.5)
MCV RBC AUTO: 87 FL (ref 78–100)
PLATELET # BLD AUTO: 334 10E3/UL (ref 150–450)
RBC # BLD AUTO: 4.43 10E6/UL (ref 3.8–5.9)
WBC # BLD AUTO: 10.9 10E3/UL (ref 4–11)

## 2022-05-04 PROCEDURE — 82728 ASSAY OF FERRITIN: CPT | Performed by: INTERNAL MEDICINE

## 2022-05-04 RX ORDER — OMEPRAZOLE 40 MG/1
40 CAPSULE, DELAYED RELEASE ORAL DAILY
Qty: 90 CAPSULE | Refills: 0 | Status: SHIPPED | OUTPATIENT
Start: 2022-05-04 | End: 2024-01-16

## 2022-05-04 NOTE — NURSING NOTE
"22 year old  Chief Complaint   Patient presents with     RECHECK     Follow up on labs that pt had done awhile ago.       Blood pressure 129/82, pulse 82, temperature 98.1  F (36.7  C), height 1.826 m (5' 11.89\"), weight 78.5 kg (173 lb 0.6 oz), SpO2 97 %. Body mass index is 23.54 kg/m .  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)     Gluten-sensitive enteropathy     Gluten intolerance     Seasonal allergic rhinitis     Mild intermittent asthma without complication     Gender dysphoria, unspecified     Bipolar 1 disorder (H)       Wt Readings from Last 2 Encounters:   05/04/22 78.5 kg (173 lb 0.6 oz)   11/22/21 84.8 kg (187 lb)     BP Readings from Last 3 Encounters:   05/04/22 129/82   05/17/21 110/72   12/03/19 117/74         Current Outpatient Medications   Medication     acetylcysteine (N-ACETYL CYSTEINE) 600 MG CAPS capsule     clobetasol (TEMOVATE) 0.05 % external ointment     clonazePAM (KLONOPIN) 1 MG tablet     divalproex sodium extended-release (DEPAKOTE ER) 500 MG 24 hr tablet     lithium (ESKALITH CR/LITHOBID) 450 MG CR tablet     loratadine 10 MG capsule     lurasidone (LATUDA) 80 MG TABS tablet     melatonin 3 MG tablet     OLANZapine (ZYPREXA) 5 MG tablet     omeprazole (PRILOSEC) 40 MG DR capsule     QUEtiapine (SEROQUEL) 50 MG tablet     No current facility-administered medications for this visit.       Social History     Tobacco Use     Smoking status: Never Smoker     Smokeless tobacco: Never Used   Substance Use Topics     " Alcohol use: No     Drug use: No       Health Maintenance Due   Topic Date Due     ADVANCE CARE PLANNING  Never done     HEPATITIS C SCREENING  Never done     MENTAL HEALTH TX PLAN  12/25/2019     PREVENTIVE CARE VISIT  01/24/2020     ASTHMA ACTION PLAN  12/03/2020     ASTHMA CONTROL TEST  05/23/2022       No results found for: PAP      May 4, 2022 3:06 PM

## 2022-05-05 PROBLEM — E61.1 LOW SERUM IRON: Status: ACTIVE | Noted: 2022-05-05

## 2022-05-05 LAB
IRON SATN MFR SERPL: 9 % (ref 15–46)
IRON SERPL-MCNC: 28 UG/DL (ref 35–180)
TIBC SERPL-MCNC: 318 UG/DL (ref 240–430)

## 2022-06-04 ENCOUNTER — LAB (OUTPATIENT)
Dept: LAB | Facility: CLINIC | Age: 22
End: 2022-06-04
Payer: COMMERCIAL

## 2022-06-04 DIAGNOSIS — K21.9 GASTROESOPHAGEAL REFLUX DISEASE WITHOUT ESOPHAGITIS: ICD-10-CM

## 2022-06-04 PROCEDURE — 87338 HPYLORI STOOL AG IA: CPT

## 2022-06-06 LAB — H PYLORI AG STL QL IA: NEGATIVE

## 2022-06-08 ENCOUNTER — OFFICE VISIT (OUTPATIENT)
Dept: FAMILY MEDICINE | Facility: CLINIC | Age: 22
End: 2022-06-08
Payer: COMMERCIAL

## 2022-06-08 VITALS
DIASTOLIC BLOOD PRESSURE: 70 MMHG | SYSTOLIC BLOOD PRESSURE: 118 MMHG | HEIGHT: 72 IN | BODY MASS INDEX: 23.84 KG/M2 | WEIGHT: 176.04 LBS | TEMPERATURE: 98 F | HEART RATE: 89 BPM | OXYGEN SATURATION: 98 %

## 2022-06-08 DIAGNOSIS — K21.00 GASTROESOPHAGEAL REFLUX DISEASE WITH ESOPHAGITIS WITHOUT HEMORRHAGE: Primary | ICD-10-CM

## 2022-06-08 DIAGNOSIS — Z71.1 CONCERN ABOUT DIABETES MELLITUS WITHOUT DIAGNOSIS: ICD-10-CM

## 2022-06-08 LAB
ANION GAP SERPL CALCULATED.3IONS-SCNC: 3 MMOL/L (ref 3–14)
BUN SERPL-MCNC: 11 MG/DL (ref 7–30)
CALCIUM SERPL-MCNC: 9.6 MG/DL (ref 8.5–10.1)
CHLORIDE BLD-SCNC: 105 MMOL/L (ref 94–109)
CO2 SERPL-SCNC: 33 MMOL/L (ref 20–32)
CREAT SERPL-MCNC: 1 MG/DL (ref 0.52–1.25)
ERYTHROCYTE [DISTWIDTH] IN BLOOD BY AUTOMATED COUNT: 12.4 % (ref 10–15)
GFR SERPL CREATININE-BSD FRML MDRD: >90 ML/MIN/1.73M2
GLUCOSE BLD-MCNC: 54 MG/DL (ref 70–99)
HBA1C MFR BLD: 4.4 % (ref 0–5.6)
HCT VFR BLD AUTO: 40.8 % (ref 35–53)
HGB BLD-MCNC: 13.5 G/DL (ref 11.7–17.7)
IRON SATN MFR SERPL: 31 % (ref 15–46)
IRON SERPL-MCNC: 103 UG/DL (ref 35–180)
MCH RBC QN AUTO: 29.2 PG (ref 26.5–33)
MCHC RBC AUTO-ENTMCNC: 33.1 G/DL (ref 31.5–36.5)
MCV RBC AUTO: 88 FL (ref 78–100)
PLATELET # BLD AUTO: 316 10E3/UL (ref 150–450)
POTASSIUM BLD-SCNC: 4.8 MMOL/L (ref 3.4–5.3)
RBC # BLD AUTO: 4.63 10E6/UL (ref 3.8–5.9)
SODIUM SERPL-SCNC: 141 MMOL/L (ref 133–144)
TIBC SERPL-MCNC: 336 UG/DL (ref 240–430)
WBC # BLD AUTO: 12.1 10E3/UL (ref 4–11)

## 2022-06-08 PROCEDURE — 80048 BASIC METABOLIC PNL TOTAL CA: CPT | Performed by: INTERNAL MEDICINE

## 2022-06-08 PROCEDURE — 83550 IRON BINDING TEST: CPT | Performed by: INTERNAL MEDICINE

## 2022-06-08 ASSESSMENT — ASTHMA QUESTIONNAIRES
QUESTION_5 LAST FOUR WEEKS HOW WOULD YOU RATE YOUR ASTHMA CONTROL: COMPLETELY CONTROLLED
QUESTION_2 LAST FOUR WEEKS HOW OFTEN HAVE YOU HAD SHORTNESS OF BREATH: ONCE OR TWICE A WEEK
QUESTION_3 LAST FOUR WEEKS HOW OFTEN DID YOUR ASTHMA SYMPTOMS (WHEEZING, COUGHING, SHORTNESS OF BREATH, CHEST TIGHTNESS OR PAIN) WAKE YOU UP AT NIGHT OR EARLIER THAN USUAL IN THE MORNING: ONCE OR TWICE
QUESTION_1 LAST FOUR WEEKS HOW MUCH OF THE TIME DID YOUR ASTHMA KEEP YOU FROM GETTING AS MUCH DONE AT WORK, SCHOOL OR AT HOME: A LITTLE OF THE TIME
ACT_TOTALSCORE: 22
QUESTION_4 LAST FOUR WEEKS HOW OFTEN HAVE YOU USED YOUR RESCUE INHALER OR NEBULIZER MEDICATION (SUCH AS ALBUTEROL): NOT AT ALL
ACT_TOTALSCORE: 22

## 2022-06-08 ASSESSMENT — PATIENT HEALTH QUESTIONNAIRE - PHQ9: SUM OF ALL RESPONSES TO PHQ QUESTIONS 1-9: 21

## 2022-06-08 NOTE — PROGRESS NOTES
"Remy Holloway is a 22 year old adult, accompanied by their mother Daisha, here for the following issues:    Concern about diabetes mellitus without diagnosis  Angella reports persisent fatigue. Parents note that Angella continues to feel tired despite sleeping well and eating healthfully. Also said that Angella tends to get \"hangry\" between meals. Gluten-free and dairy-free diet, no caffeine, not sated after meals. Concerned about the possibility of diabetes.     Today, Seema and their mother report that they have been eating overnight oats for breakfast, lunch x2  @ 11 AM and 4PM -- usually fruit, veggies, and turkey sandwhich, and dinner. Hunger is primarily occurring in primarily in the mid-morning. Feels thirsty a lot, occasionally getting up at night to urinate. Angella works in the morning, they get two breaks, but does not snack at work. Parents have also noticed weight loss.    Wt Readings from Last 4 Encounters:   06/08/22 79.9 kg (176 lb 0.6 oz)   05/04/22 78.5 kg (173 lb 0.6 oz)   11/22/21 84.8 kg (187 lb)   05/17/21 93 kg (205 lb)   Body mass index is 23.95 kg/m .      Gastroesophageal reflux disease with esophagitis without hemorrhage  Angella was seen for abdominal pain and dysphagia on 5/4/22. Had been taking omeprazole 40 mg without improvement of symptoms. Negative H. Pylori test on 6/4/22. Concerned about constipation. Angella takes magnesium/night for constipation. They are still experiencing abdominal pain. Does not know if they are taking omeprazole currently. Endorses regurgitation and stomach pain. Also reports some dysphagia mid to lower esophagus and persistent irritated throat.     Throat Pain  Seema reports that they have had an intermittent \"throat ache\" which coincides with pain in their head for a \"long time\". Occurs primarily during the day, endorses belching, coughing.    Mental Health  Seema asserts that they have been following with psychiatry and feels comfortable reaching out for help if needed. They " frequently discuss mental health with their parents.    PHQ 2/13/2020 11/23/2021 6/8/2022   PHQ-9 Total Score 14 23 21   Q9: Thoughts of better off dead/self-harm past 2 weeks More than half the days More than half the days More than half the days   Passive SI, score is 2, no active thoughts, discussed this with Angella and mom during clinic visit. Contract for safety discussed.    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 Use under gloves at night. 45 g 1     clonazePAM (KLONOPIN) 1 MG tablet 1 mg At Bedtime Take 0.5 -1 tablet in the morning, and may repeat one additional dose q day prn 90 tablet 1     divalproex sodium extended-release (DEPAKOTE ER) 500 MG 24 hr tablet Take 500 mg by mouth       lithium (ESKALITH CR/LITHOBID) 450 MG CR tablet 450 mg in AM and 900 mg in HS 90 tablet 0     loratadine 10 MG capsule Take 10 mg by mouth daily 30 capsule 3     lurasidone (LATUDA) 80 MG TABS tablet Take 160 mg by mouth daily 2 tabs at bedtime       melatonin 3 MG tablet Take 2-3 tablets at night 30 tablet 0  "    OLANZapine (ZYPREXA) 5 MG tablet Take 5-10 mg by mouth as needed       omeprazole (PRILOSEC) 40 MG DR capsule Take 1 capsule (40 mg) by mouth daily 90 capsule 0     QUEtiapine (SEROQUEL) 50 MG tablet 200 mg as needed Take 1-2 po TID prn 120 tablet 1       Allergies   Allergen Reactions     Dairy Digestive Other (See Comments)     Gluten Meal Other (See Comments)     No Known Allergies      Serotonin Reuptake Inhibitors (Ssris) [Serotonin Reuptake Inhibitors]      Other reaction(s): Psychosis  Manic with SSRI use        EXAM  /70   Pulse 89   Temp 98  F (36.7  C)   Ht 1.826 m (5' 11.89\")   Wt 79.9 kg (176 lb 0.6 oz)   SpO2 98%   BMI 23.95 kg/m    Gen: Alert, pleasant, NAD  COR: S1,S2, no murmur  HENT: No lymphadenopathy.   Abdomen: soft, mild midepigastric and lower quadrant tenderness, normal bowel sounds, no HSM     Assessment:  (K21.00) Gastroesophageal reflux disease with esophagitis without hemorrhage  (primary encounter diagnosis)  Comment: Persistent symptoms, despite PPI use, reports throat irritation and some dysphagia.   Plan: Adult Gastro Ref - Procedure Only, Iron and         Iron Binding Capacity, CBC with Platelets        Recommend upper endoscopy to check for stricture, esophagitis. Recommend daily use of omeprazole.    (Z71.1) Concern about diabetes mellitus without diagnosis  Comment: Weight loss, thirst, also reports feeling \"hangry\" between meals. A1c is low, no evidence for diabetes.  Plan: Hemoglobin A1c, Basic metabolic panel        Check for diabetes. Recommend pairing a protein at each meal or at snack time to stabilize blood sugars.  Lab Results   Component Value Date    A1C 4.4 06/08/2022     26 minutes spend on the date of this encounter doing chart review, history and exam, documentation and further activities as noted above.     Follow-up after endoscopy    Lana Faustin MD  Internal Medicine/Pediatrics      I, Loree Norton, am serving as a scribe to document services " personally performed by Dr. Lana Faustin, based on data collection and the provider's statements to me. Dr. Faustin has reviewed, edited, and approved the above note.

## 2022-06-08 NOTE — NURSING NOTE
"22 year old  Chief Complaint   Patient presents with     RECHECK     Pt reports losing weight despite eating normally. Ongoing for about 9 months. Also being very thirsty and a blood test was abnormal.        Blood pressure 118/70, pulse 89, temperature 98  F (36.7  C), height 1.826 m (5' 11.89\"), weight 79.9 kg (176 lb 0.6 oz), SpO2 98 %. Body mass index is 23.95 kg/m .  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)     Gluten-sensitive enteropathy     Gluten intolerance     Seasonal allergic rhinitis     Mild intermittent asthma without complication     Gender dysphoria, unspecified     Bipolar 1 disorder (H)     Low serum iron       Wt Readings from Last 2 Encounters:   06/08/22 79.9 kg (176 lb 0.6 oz)   05/04/22 78.5 kg (173 lb 0.6 oz)     BP Readings from Last 3 Encounters:   06/08/22 118/70   05/04/22 129/82   05/17/21 110/72         Current Outpatient Medications   Medication     acetylcysteine (N-ACETYL CYSTEINE) 600 MG CAPS capsule     clobetasol (TEMOVATE) 0.05 % external ointment     clonazePAM (KLONOPIN) 1 MG tablet     divalproex sodium extended-release (DEPAKOTE ER) 500 MG 24 hr tablet     lithium (ESKALITH CR/LITHOBID) 450 MG CR tablet     loratadine 10 MG capsule     lurasidone (LATUDA) 80 MG TABS tablet     melatonin 3 MG tablet     OLANZapine (ZYPREXA) 5 MG tablet     omeprazole (PRILOSEC) 40 MG DR capsule     QUEtiapine (SEROQUEL) 50 MG tablet     No current facility-administered medications for this visit.       Social History "     Tobacco Use     Smoking status: Never Smoker     Smokeless tobacco: Never Used   Substance Use Topics     Alcohol use: No     Drug use: No       Health Maintenance Due   Topic Date Due     ADVANCE CARE PLANNING  Never done     HEPATITIS C SCREENING  Never done     MENTAL HEALTH TX PLAN  12/25/2019     PREVENTIVE CARE VISIT  01/24/2020     ASTHMA ACTION PLAN  12/03/2020       No results found for: PAP      June 8, 2022 9:47 AM

## 2022-06-20 ENCOUNTER — HOSPITAL ENCOUNTER (OUTPATIENT)
Facility: AMBULATORY SURGERY CENTER | Age: 22
End: 2022-06-20
Attending: INTERNAL MEDICINE
Payer: COMMERCIAL

## 2022-06-20 ENCOUNTER — TELEPHONE (OUTPATIENT)
Dept: GASTROENTEROLOGY | Facility: CLINIC | Age: 22
End: 2022-06-20
Payer: COMMERCIAL

## 2022-06-20 NOTE — TELEPHONE ENCOUNTER
Screening Questions  BlueKIND OF PREP RedLOCATION [review exclusion criteria] GreenSEDATION TYPE      1. Are you able to give consent for your medical care? Do you have a legal guardian or medical Power of ?  N MOM MAKE (Sedation review/consideration needed)    2. Have you had a positive covid test in the last 90 days? N  a. If yes, what date?N    3. Are you active on mychart? Y    4. What insurance is in the chart? [employer] [place]      3.   Ordering/Referring Provider: AMAURY     4. BMI 24.5 [BMI OVER 40-EXTENDED PREP]  If greater than 40 review exclusion criteria [PAC APPT IF @ [facility]]        5.  Respiratory Screening :  [If yes to any of the following HOSPITAL setting only]     Do you use daily home oxygen? N    Do you have mod to severe Obstructive Sleep Apnea? N  [OKAY @ [facility] SH PH RI]   Do you have Pulmonary Hypertension? N     Do you have UNCONTROLLED asthma? N        6.   Have you had a heart or lung transplant? N      7.   Are you currently on dialysis? N [ If yes, G-PREP & HOSPITAL setting only]     8.   Do you have chronic kidney disease? N [ If yes, G-PREP ]    9.   Have you had a stroke or Transient ischemic attack (TIA - aka  mini stroke ) within 6 months?  N (If yes, please review exclusion criteria)    10.   In the past 6 months, have you had any heart related issues including cardiomyopathy or heart attack? N           If yes, did it require cardiac stenting or other implantable device? N      11.   Do you have any implantable devices in your body (pacemaker, defib, LVAD)? N (If yes, please review exclusion criteria)    12.   Do you take nitroglycerin? N           If yes, how often? N  (if yes, HOSPITAL setting ONLY)    13.   Are you currently taking any blood thinners? N           [IF YES, INFORM PATIENT TO FOLLOW UP W/ ORDERING PROVIDER FOR BRIDGING INSTRUCTIONS]     14.   Do you have a diagnosis of diabetes? N   [ If yes, G-PREP ]    15.   [FEMALES] Are you currently pregnant? NA    If  yes, how many weeks? NA    16.   Are you taking any prescription pain medications on a routine schedule?  N  [ If yes, EXTENDED PREP.] [If yes, MAC]    17.   Do you have any chemical dependencies such as alcohol, street drugs, or methadone?  N [If yes, MAC]    18.   Do you have any history of post-traumatic stress syndrome, severe anxiety or history of psychosis?  Y DEVELOPMENTAL DISABILITY   [If yes, MAC]    19.   Do you transfer independently?  Y    20.  On a regular basis do you go 3-5 days between bowel movements? NA   [ If yes, EXTENDED PREP.]    21.   [facility] LOC[facility] Pharm[facility] for Pre Prescription   [facility] [place], [place] - [address]      Scheduling Details      Caller : BRADY (LEGAL GUARDIAN)   (Please ask for phone number if not scheduled by patient)    Type of Procedure Scheduled: EGD   Which Colonoscopy Prep was Sent?:   ALISSA CF PATIENTS & GROEN'S PATIENTS REQUIRE EXTENDED PREP  Surgeon: brandon  Date of [place]: 8/17  [place] [facility] [place]: mac  Conscious Sedation- Needs  for 6 hours after the procedure  MAC/General-Needs  for 24 hours after procedure    Pre-op Required at [facility] [place], [place] and OR for MAC sedation: n  (advise patient they will need a pre-op prior to procedure -)      Informed patient they will need an adult  y  Cannot take any type of public or medical transportation alone    Pre-Procedure Covid test to be completed at [facility] or Externally: y    Confirmed Nurse will call to complete assessment y    Additional comments: n

## 2022-07-28 ENCOUNTER — TELEPHONE (OUTPATIENT)
Dept: GASTROENTEROLOGY | Facility: CLINIC | Age: 22
End: 2022-07-28

## 2022-07-28 NOTE — TELEPHONE ENCOUNTER
Caller: Remy Holloway      Procedure: EGD    Date, Location, and Surgeon of Procedure Cancelled: 8/17, Jason PALACIO    Ordering Provider:Lana Faustin MD     Reason for cancel (please be detailed, any staff messages or encounters to note?): Need to change the date        Rescheduled: Y     If rescheduled:    Date: 10/17   [place] [facility] [place]ent: Yes (changes to prep?)   Covid Test Rescheduled: No   Note any change or update to original order/sedation:

## 2022-10-10 ENCOUNTER — TELEPHONE (OUTPATIENT)
Dept: GASTROENTEROLOGY | Facility: CLINIC | Age: 22
End: 2022-10-10

## 2022-10-10 NOTE — TELEPHONE ENCOUNTER
Per chart review patient's parents Daisha and Ed are pt's guardians; paperwork under media tab [phone].    Attempted to contact patient regarding upcoming EGD procedure on 10.17.2022 for pre assessment questions. No answer.     Left message to return call to [phone] #4    Discuss at home rapid antigen COVID test 1-2 days prior to procedure.    Arrival time: 0730    Facility location: [facility]    Sedation type: MAC-bipolar    Indication for procedure: GERD, dysphagia    Eulalia St RN

## 2022-10-14 NOTE — TELEPHONE ENCOUNTER
RN spoke with Ed Jewel, pt's father and guardian.    Pre assessment questions completed for upcoming EGD procedure scheduled on 10.17.2022    Discussed at home rapid antigen COVID test 1-2 days prior to procedure.    Reviewed procedural arrival time 0730 and facility location [facility].    Designated  policy reviewed. Instructed to have someone stay 24 hours post procedure.     Anticoagulation/blood thinners? no    Electronic implanted devices? no    Reviewed EGD prep instructions with patient.     Guardians will be present at appointment to sign consent.    Patient verbalized understanding and had no questions or concerns at this time.    Eulalia St RN

## 2022-10-17 ENCOUNTER — ANESTHESIA EVENT (OUTPATIENT)
Dept: SURGERY | Facility: AMBULATORY SURGERY CENTER | Age: 22
End: 2022-10-17
Payer: COMMERCIAL

## 2022-10-17 ENCOUNTER — HOSPITAL ENCOUNTER (OUTPATIENT)
Facility: AMBULATORY SURGERY CENTER | Age: 22
Discharge: HOME | End: 2022-10-17
Attending: INTERNAL MEDICINE | Admitting: INTERNAL MEDICINE
Payer: COMMERCIAL

## 2022-10-17 ENCOUNTER — ANESTHESIA (OUTPATIENT)
Dept: SURGERY | Facility: AMBULATORY SURGERY CENTER | Age: 22
End: 2022-10-17
Payer: COMMERCIAL

## 2022-10-17 VITALS
RESPIRATION RATE: 14 BRPM | HEART RATE: 64 BPM | HEIGHT: 71 IN | TEMPERATURE: 97.3 F | DIASTOLIC BLOOD PRESSURE: 77 MMHG | OXYGEN SATURATION: 97 % | BODY MASS INDEX: 26.6 KG/M2 | WEIGHT: 190 LBS | SYSTOLIC BLOOD PRESSURE: 128 MMHG

## 2022-10-17 VITALS — HEART RATE: 64 BPM

## 2022-10-17 LAB — UPPER GI ENDOSCOPY: NORMAL

## 2022-10-17 PROCEDURE — 43239 EGD BIOPSY SINGLE/MULTIPLE: CPT

## 2022-10-17 PROCEDURE — 88305 TISSUE EXAM BY PATHOLOGIST: CPT | Mod: TC | Performed by: INTERNAL MEDICINE

## 2022-10-17 RX ORDER — PROPOFOL 10 MG/ML
INJECTION, EMULSION INTRAVENOUS CONTINUOUS PRN
Status: DISCONTINUED | OUTPATIENT
Start: 2022-10-17 | End: 2022-10-17

## 2022-10-17 RX ORDER — NALOXONE HYDROCHLORIDE 0.4 MG/ML
0.2 INJECTION, SOLUTION INTRAMUSCULAR; INTRAVENOUS; SUBCUTANEOUS
Status: DISCONTINUED | OUTPATIENT
Start: 2022-10-17 | End: 2022-10-18 | Stop reason: HOSPADM

## 2022-10-17 RX ORDER — LIDOCAINE 40 MG/G
CREAM TOPICAL
Status: DISCONTINUED | OUTPATIENT
Start: 2022-10-17 | End: 2022-10-17 | Stop reason: HOSPADM

## 2022-10-17 RX ORDER — FLUMAZENIL 0.1 MG/ML
0.2 INJECTION, SOLUTION INTRAVENOUS
Status: ACTIVE | OUTPATIENT
Start: 2022-10-17 | End: 2022-10-17

## 2022-10-17 RX ORDER — ONDANSETRON 2 MG/ML
4 INJECTION INTRAMUSCULAR; INTRAVENOUS
Status: DISCONTINUED | OUTPATIENT
Start: 2022-10-17 | End: 2022-10-17 | Stop reason: HOSPADM

## 2022-10-17 RX ORDER — KETAMINE HYDROCHLORIDE 10 MG/ML
INJECTION INTRAMUSCULAR; INTRAVENOUS PRN
Status: DISCONTINUED | OUTPATIENT
Start: 2022-10-17 | End: 2022-10-17

## 2022-10-17 RX ORDER — PROCHLORPERAZINE MALEATE 10 MG
10 TABLET ORAL EVERY 6 HOURS PRN
Status: DISCONTINUED | OUTPATIENT
Start: 2022-10-17 | End: 2022-10-18 | Stop reason: HOSPADM

## 2022-10-17 RX ORDER — ONDANSETRON 2 MG/ML
4 INJECTION INTRAMUSCULAR; INTRAVENOUS EVERY 6 HOURS PRN
Status: DISCONTINUED | OUTPATIENT
Start: 2022-10-17 | End: 2022-10-18 | Stop reason: HOSPADM

## 2022-10-17 RX ORDER — SODIUM CHLORIDE, SODIUM LACTATE, POTASSIUM CHLORIDE, CALCIUM CHLORIDE 600; 310; 30; 20 MG/100ML; MG/100ML; MG/100ML; MG/100ML
INJECTION, SOLUTION INTRAVENOUS CONTINUOUS PRN
Status: DISCONTINUED | OUTPATIENT
Start: 2022-10-17 | End: 2022-10-17

## 2022-10-17 RX ORDER — NALOXONE HYDROCHLORIDE 0.4 MG/ML
0.4 INJECTION, SOLUTION INTRAMUSCULAR; INTRAVENOUS; SUBCUTANEOUS
Status: DISCONTINUED | OUTPATIENT
Start: 2022-10-17 | End: 2022-10-18 | Stop reason: HOSPADM

## 2022-10-17 RX ORDER — LIDOCAINE HYDROCHLORIDE 20 MG/ML
INJECTION, SOLUTION INFILTRATION; PERINEURAL PRN
Status: DISCONTINUED | OUTPATIENT
Start: 2022-10-17 | End: 2022-10-17

## 2022-10-17 RX ORDER — ONDANSETRON 4 MG/1
4 TABLET, ORALLY DISINTEGRATING ORAL EVERY 6 HOURS PRN
Status: DISCONTINUED | OUTPATIENT
Start: 2022-10-17 | End: 2022-10-18 | Stop reason: HOSPADM

## 2022-10-17 RX ADMIN — PROPOFOL 300 MCG/KG/MIN: 10 INJECTION, EMULSION INTRAVENOUS at 08:48

## 2022-10-17 RX ADMIN — SODIUM CHLORIDE, SODIUM LACTATE, POTASSIUM CHLORIDE, CALCIUM CHLORIDE: 600; 310; 30; 20 INJECTION, SOLUTION INTRAVENOUS at 08:45

## 2022-10-17 RX ADMIN — LIDOCAINE HYDROCHLORIDE 60 MG: 20 INJECTION, SOLUTION INFILTRATION; PERINEURAL at 08:51

## 2022-10-17 RX ADMIN — LIDOCAINE HYDROCHLORIDE 40 MG: 20 INJECTION, SOLUTION INFILTRATION; PERINEURAL at 08:48

## 2022-10-17 RX ADMIN — KETAMINE HYDROCHLORIDE 30 MG: 10 INJECTION INTRAMUSCULAR; INTRAVENOUS at 08:47

## 2022-10-17 NOTE — ANESTHESIA POSTPROCEDURE EVALUATION
Patient: Remy Holloway    Procedure: Procedure(s):  ESOPHAGOGASTRODUODENOSCOPY, WITH BIOPSY       Anesthesia Type:  MAC    Note:  Disposition: Outpatient   Postop Pain Control: Uneventful            Sign Out: Well controlled pain   PONV: No   Neuro/Psych: Uneventful            Sign Out: Acceptable/Baseline neuro status   Airway/Respiratory: Uneventful            Sign Out: Acceptable/Baseline resp. status   CV/Hemodynamics: Uneventful            Sign Out: Acceptable CV status; No obvious hypovolemia; No obvious fluid overload   Other NRE: NONE   DID A NON-ROUTINE EVENT OCCUR? No           Last vitals:  Vitals Value Taken Time   /77 10/17/22 0930   Temp 36.3  C (97.3  F) 10/17/22 0930   Pulse 64 10/17/22 0930   Resp 14 10/17/22 0930   SpO2 97 % 10/17/22 0930       Electronically Signed By: Montana Hdz MD, MD  October 17, 2022  11:30 AM

## 2022-10-17 NOTE — ANESTHESIA CARE TRANSFER NOTE
Patient: Remy Holloway    Procedure: Procedure(s):  ESOPHAGOGASTRODUODENOSCOPY, WITH BIOPSY       Diagnosis: Gastroesophageal reflux disease with esophagitis without hemorrhage [K21.00]  Diagnosis Additional Information: No value filed.    Anesthesia Type:   MAC     Note:    Oropharynx: oropharynx clear of all foreign objects and spontaneously breathing  Level of Consciousness: drowsy  Oxygen Supplementation: room air    Independent Airway: airway patency satisfactory and stable  Dentition: dentition unchanged  Vital Signs Stable: post-procedure vital signs reviewed and stable  Report to RN Given: handoff report given  Patient transferred to: Phase II  Comments: Report to Phase II RN. Resps easy and regular.   Handoff Report: Identifed the Patient, Identified the Reponsible Provider, Reviewed the pertinent medical history, Discussed the surgical course, Reviewed Intra-OP anesthesia mangement and issues during anesthesia, Set expectations for post-procedure period and Allowed opportunity for questions and acknowledgement of understanding      Vitals:  Vitals Value Taken Time   /66 10/17/22 0912   Temp 36.3  C (97.4  F) 10/17/22 0912   Pulse 70 10/17/22 0912   Resp 16 10/17/22 0912   SpO2 95 % 10/17/22 0912       Electronically Signed By: TREY MENJIVAR CRNA  October 17, 2022  9:16 AM

## 2022-10-17 NOTE — H&P
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 Tubes - removed 2002       Past Medical History:   Past Medical History:   Diagnosis Date     ADHD      Amblyopia, unspecified      Bipolar 1 disorder (H)      Esotropia, unspecified      Gender dysphoria      Lack of normal physiological development, unspecified     Normal chromosomes, nl MRI, neg fragile X     Learning disabilities      Redundant prepuce and phimosis     Penile coronal adhesions-repaired     Umbilical hernia without mention of obstruction or gangrene     repaired     Unspecified otitis media     Recurrent       Social History:   Social History     Tobacco Use     Smoking status: Never     Smokeless tobacco: Never   Substance Use Topics     Alcohol use: No       Family History:   Family History   Problem Relation Age of Onset     Migraines Mother      Breast Cancer  "Natural parent         Mother     Depression Paternal Grandfather        Allergies:   Allergies   Allergen Reactions     Dairy Digestive Other (See Comments)     Gluten Meal Other (See Comments)     No Known Allergies      Serotonin Reuptake Inhibitors (Ssris) [Serotonin Reuptake Inhibitors]      Other reaction(s): Psychosis  Manic with SSRI use       Active Medications:   Current Outpatient Medications   Medication Sig Dispense Refill     acetylcysteine (N-ACETYL CYSTEINE) 600 MG CAPS capsule Take two po bid 60 capsule 3     clobetasol (TEMOVATE) 0.05 % external ointment Apply topically twice daily. Use under gloves at night. 45 g 1     clonazePAM (KLONOPIN) 1 MG tablet 1 mg At Bedtime Take 0.5 -1 tablet in the morning, and may repeat one additional dose q day prn 90 tablet 1     divalproex sodium extended-release (DEPAKOTE ER) 500 MG 24 hr tablet Take 1,500 mg by mouth At Bedtime       lithium (ESKALITH CR/LITHOBID) 450 MG CR tablet 450 mg in AM and 900 mg in HS 90 tablet 0     loratadine 10 MG capsule Take 10 mg by mouth daily 30 capsule 3     lurasidone (LATUDA) 80 MG TABS tablet Take 160 mg by mouth daily 2 tabs at bedtime       melatonin 3 MG tablet Take 2-3 tablets at night 30 tablet 0     OLANZapine (ZYPREXA) 5 MG tablet Take 5-10 mg by mouth as needed       omeprazole (PRILOSEC) 40 MG DR capsule Take 1 capsule (40 mg) by mouth daily 90 capsule 0     QUEtiapine (SEROQUEL) 50 MG tablet 200 mg as needed Take 1-2 po TID prn 120 tablet 1       Systemic Review:   CONSTITUTIONAL: NEGATIVE for fever, chills, change in weight  ENT/MOUTH: NEGATIVE for ear, mouth and throat problems  RESP: NEGATIVE for significant cough or SOB  CV: NEGATIVE for chest pain, palpitations or peripheral edema    Physical Examination:   Vital Signs: /78 (BP Location: Right arm)   Pulse 74   Temp 98.4  F (36.9  C) (Temporal)   Resp 18   Ht 1.803 m (5' 11\")   Wt 86.2 kg (190 lb)   SpO2 99%   BMI 26.50 kg/m    GENERAL: healthy, " alert and no distress  NECK: no adenopathy, no asymmetry, masses, or scars  RESP: lungs clear to auscultation - no rales, rhonchi or wheezes  CV: regular rate and rhythm, normal S1 S2, no S3 or S4, no murmur, click or rub, no peripheral edema and peripheral pulses strong  ABDOMEN: soft, nontender, no hepatosplenomegaly, no masses and bowel sounds normal  MS: no gross musculoskeletal defects noted, no edema    Plan: Appropriate to proceed as scheduled.      Esa Morgan MD  10/17/2022    PCP:  Lana Faustin

## 2022-10-17 NOTE — ANESTHESIA PREPROCEDURE EVALUATION
Anesthesia Pre-Procedure Evaluation    Patient: Remy Holloway   MRN: [id number] : 2000        Procedure : Procedure(s):  ESOPHAGOGASTRODUODENOSCOPY (EGD)          Past Medical History:   Diagnosis Date     ADHD      Amblyopia, unspecified      Bipolar 1 disorder (H)      Esotropia, unspecified      Gender dysphoria      Lack of normal physiological development, unspecified     Normal chromosomes, nl MRI, neg fragile X     Learning disabilities      Redundant prepuce and phimosis     Penile coronal adhesions-repaired     Umbilical hernia without mention of obstruction or gangrene     repaired     Unspecified otitis media     Recurrent      Past Surgical History:   Procedure Laterality Date     DENTAL SURGERY      wisdom teeth removed     HC REPAIR, INCOMPLETE CIRCUMCISION      Recircumcised     HERNIA REPAIR, UMBILICAL      repair umbilical hernia     ZZHC CREATE EARDRUM OPENING,GEN ANESTH  2000    P.E. Tubes - removed       Allergies   Allergen Reactions     Dairy Digestive Other (See Comments)     Gluten Meal Other (See Comments)     No Known Allergies      Serotonin Reuptake Inhibitors (Ssris) [Serotonin Reuptake Inhibitors]      Other reaction(s): Psychosis  Manic with SSRI use      Social History     Tobacco Use     Smoking status: Never     Smokeless tobacco: Never   Substance Use Topics     Alcohol use: No      Wt Readings from Last 1 Encounters:   10/17/22 86.2 kg (190 lb)        Anesthesia Evaluation            ROS/MED HX  ENT/Pulmonary:     (+) asthma     Neurologic:       Cardiovascular:       METS/Exercise Tolerance:     Hematologic:       Musculoskeletal:       GI/Hepatic:     (+) GERD, Symptomatic,     Renal/Genitourinary:       Endo:       Psychiatric/Substance Use:     (+) psychiatric history anxiety, depression and bipolar     Infectious Disease:       Malignancy:       Other:               OUTSIDE LABS:  CBC:   Lab Results   Component Value Date    WBC 12.1 (H) 2022     WBC 10.9 05/04/2022    HGB 13.5 06/08/2022    HGB 12.8 05/04/2022    HCT 40.8 06/08/2022    HCT 38.6 05/04/2022     06/08/2022     05/04/2022     BMP:   Lab Results   Component Value Date     06/08/2022     02/20/2019    POTASSIUM 4.8 06/08/2022    POTASSIUM 4.0 02/20/2019    CHLORIDE 105 06/08/2022    CHLORIDE 105 02/20/2019    CO2 33 (H) 06/08/2022    CO2 27 02/20/2019    BUN 11 06/08/2022    BUN 12 02/20/2019    CR 1.00 06/08/2022    CR 0.94 02/20/2019    GLC 54 (L) 06/08/2022    GLC 84 02/20/2019     COAGS: No results found for: PTT, INR, FIBR  POC:   Lab Results   Component Value Date    BGM 99 04/29/2015     HEPATIC:   Lab Results   Component Value Date    ALBUMIN 4.3 02/20/2019    PROTTOTAL 8.1 02/20/2019    ALT 29 02/20/2019    AST 26 02/20/2019    ALKPHOS 139 02/20/2019    BILITOTAL 0.4 02/20/2019     OTHER:   Lab Results   Component Value Date    A1C 4.4 06/08/2022    GAIL 9.6 06/08/2022    TSH 1.96 01/22/2015    CRP 12.7 (H) 03/13/2019    SED 10 02/20/2019       Anesthesia Plan    ASA Status:  2      Anesthesia Type: MAC.     - Reason for MAC: immobility needed              Consents    Anesthesia Plan(s) and associated risks, benefits, and realistic alternatives discussed. Questions answered and patient/representative(s) expressed understanding.     - Discussed: Risks, Benefits and Alternatives for BOTH SEDATION and the PROCEDURE were discussed     - Discussed with:  Patient      - Extended Intubation/Ventilatory Support Discussed: No.      - Patient is DNR/DNI Status: No    Use of blood products discussed: No .     Postoperative Care    Pain management: Oral pain medications.        Comments:                Montana Hdz MD, MD

## 2022-10-18 LAB
PATH REPORT.COMMENTS IMP SPEC: NORMAL
PATH REPORT.COMMENTS IMP SPEC: NORMAL
PATH REPORT.FINAL DX SPEC: NORMAL
PATH REPORT.GROSS SPEC: NORMAL
PATH REPORT.MICROSCOPIC SPEC OTHER STN: NORMAL
PATH REPORT.RELEVANT HX SPEC: NORMAL
PHOTO IMAGE: NORMAL

## 2022-10-18 PROCEDURE — 88305 TISSUE EXAM BY PATHOLOGIST: CPT | Mod: 26 | Performed by: PATHOLOGY

## 2022-10-23 ENCOUNTER — HEALTH MAINTENANCE LETTER (OUTPATIENT)
Age: 22
End: 2022-10-23

## 2023-03-01 ENCOUNTER — NURSE TRIAGE (OUTPATIENT)
Dept: FAMILY MEDICINE | Facility: CLINIC | Age: 23
End: 2023-03-01

## 2023-03-01 NOTE — TELEPHONE ENCOUNTER
"Pt's mom called to report \"unresolved constipation\", c/o stomach cramps since Monday; mostly happens after meals. They have been awake during night from sunday to Monday.  Tried Senokot on Monday & Tuesday around 8pm.  Miralax powder administered along with Senokot.  Reports pain comes and goes.   Pt states it is \"almost as bad now as on Monday.\"  Taking in fluids: kombucha, water, lemonade, Gatorade.   Ate oatmeal for breakfast, hamburger for lunch.   LBM mid-morning \"loose consistency.  Reporting 5 episodes of diarrhea each day.  Advised to stop Miralax and Senokot and switch to clear fluids or half-strength Gatorade. Hold coffee. Eat bland foods, not hamburger.  Discussed OTC Pepto Bismol and bland diet.  Call back with worsening or unresolving symptoms.      Reason for Disposition    [1] MILD-MODERATE pain AND [2] comes and goes (cramps)    Additional Information    Negative: Shock suspected (e.g., cold/pale/clammy skin, too weak to stand, low BP, rapid pulse)    Negative: Difficult to awaken or acting confused (e.g., disoriented, slurred speech)    Negative: Passed out (i.e., lost consciousness, collapsed and was not responding)    Negative: Sounds like a life-threatening emergency to the triager    Negative: Chest pain    Negative: Pain is mainly in upper abdomen  (if needed ask: \"is it mainly above the belly button?\")    Negative: Followed an abdomen (stomach) injury    Negative: [1] SEVERE pain (e.g., excruciating) AND [2] present > 1 hour    Negative: [1] SEVERE pain AND [2] age > 60 years    Negative: [1] Vomiting AND [2] contains red blood or black (\"coffee ground\") material  (Exception: few red streaks in vomit that only happened once)    Negative: Blood in bowel movements  (Exception: Blood on surface of BM with constipation)    Negative: Black or tarry bowel movements (Exception: chronic-unchanged black-grey bowel movements AND is taking iron pills or Pepto-bismol)    Negative: [1] Unable to urinate " "(or only a few drops) > 4 hours AND [2] bladder feels very full (e.g., palpable bladder or strong urge to urinate)    Negative: [1] Pain in the scrotum or testicle AND [2] present > 1 hour    Negative: Patient sounds very sick or weak to the triager    Negative: [1] MILD-MODERATE pain AND [2] constant AND [3] present > 2 hours    Negative: [1] Vomiting AND [2] abdomen looks much more swollen than usual    Negative: [1] Vomiting AND [2] contains bile (green color)    Negative: White of the eyes have turned yellow (i.e., jaundice)    Negative: Fever > 103 F (39.4 C)    Negative: [1] Fever > 101 F (38.3 C) AND [2] age > 60 years    Negative: [1] Fever > 100.0 F (37.8 C) AND [2] bedridden (e.g., nursing home patient, CVA, chronic illness, recovering from surgery)    Negative: [1] Fever > 100.0 F (37.8 C) AND [2] diabetes mellitus or weak immune system (e.g., HIV positive, cancer chemo, splenectomy, organ transplant, chronic steroids)    Negative: [1] SEVERE pain AND [2] present < 1 hour    Answer Assessment - Initial Assessment Questions  1. LOCATION: \"Where does it hurt?\"       Pointing to middle of stomach. They have Autism.   2. RADIATION: \"Does the pain shoot anywhere else?\" (e.g., chest, back)      No  3. ONSET: \"When did the pain begin?\" (Minutes, hours or days ago)       2/27/2023  4. SUDDEN: \"Gradual or sudden onset?\"      Sudden  5. PATTERN \"Does the pain come and go, or is it constant?\"     - If constant: \"Is it getting better, staying the same, or worsening?\"       (Note: Constant means the pain never goes away completely; most serious pain is constant and it progresses)      - If intermittent: \"How long does it last?\" \"Do you have pain now?\"      (Note: Intermittent means the pain goes away completely between bouts)      Comes and goes  6. SEVERITY: \"How bad is the pain?\"  (e.g., Scale 1-10; mild, moderate, or severe)     - MILD (1-3): doesn't interfere with normal activities, abdomen soft and not tender to " "touch      - MODERATE (4-7): interferes with normal activities or awakens from sleep, abdomen tender to touch      - SEVERE (8-10): excruciating pain, doubled over, unable to do any normal activities        Moderate to severe mostly after eating  7. RECURRENT SYMPTOM: \"Have you ever had this type of stomach pain before?\" If Yes, ask: \"When was the last time?\" and \"What happened that time?\"       They have chronic stomach issues but not like this.  8. CAUSE: \"What do you think is causing the stomach pain?\"      Eating  9. RELIEVING/AGGRAVATING FACTORS: \"What makes it better or worse?\" (e.g., movement, antacids, bowel movement)      Eating makes it worse  10. OTHER SYMPTOMS: \"Do you have any other symptoms?\" (e.g., back pain, diarrhea, fever, urination pain, vomiting)        Diarrhea possibly from laxative. Pain in low to mid back.    Protocols used: ABDOMINAL PAIN - MALE-A-    JUSTIN Ibrahim, RN  03/01/23, 4:08 PM    "

## 2023-04-02 ENCOUNTER — HEALTH MAINTENANCE LETTER (OUTPATIENT)
Age: 23
End: 2023-04-02

## 2023-08-03 ENCOUNTER — TELEPHONE (OUTPATIENT)
Dept: FAMILY MEDICINE | Facility: CLINIC | Age: 23
End: 2023-08-03

## 2023-08-03 ENCOUNTER — OFFICE VISIT (OUTPATIENT)
Dept: FAMILY MEDICINE | Facility: CLINIC | Age: 23
End: 2023-08-03
Payer: COMMERCIAL

## 2023-08-03 ENCOUNTER — NURSE TRIAGE (OUTPATIENT)
Dept: NURSING | Facility: CLINIC | Age: 23
End: 2023-08-03
Payer: COMMERCIAL

## 2023-08-03 VITALS
HEART RATE: 82 BPM | SYSTOLIC BLOOD PRESSURE: 120 MMHG | DIASTOLIC BLOOD PRESSURE: 77 MMHG | HEIGHT: 71 IN | WEIGHT: 183.04 LBS | TEMPERATURE: 98.7 F | OXYGEN SATURATION: 96 % | BODY MASS INDEX: 25.63 KG/M2

## 2023-08-03 DIAGNOSIS — R05.1 ACUTE COUGH: Primary | ICD-10-CM

## 2023-08-03 DIAGNOSIS — H66.91 ACUTE RIGHT OTITIS MEDIA: Primary | ICD-10-CM

## 2023-08-03 RX ORDER — BENZONATATE 100 MG/1
100-200 CAPSULE ORAL 3 TIMES DAILY PRN
Qty: 30 CAPSULE | Refills: 0 | Status: SHIPPED | OUTPATIENT
Start: 2023-08-03 | End: 2023-08-10

## 2023-08-03 NOTE — TELEPHONE ENCOUNTER
Triage call  Father calling to report  Patient has been home sick since Sunday with a temp of  101 they have been coughing non productive. They have taken a covid test  everyday and has tested negative.  They have taken delsym cough medicine but it has not given any relief and they have been taking acetaminophen  for the fever .Denies shortness of breath . Father is guardian .    Per protocol  see in office today. Care advice given.  Verbalizes understanding and agrees with plan. Father will call Clinic for a appointment  when clinic opens but will take to urgent care if no able to get a appointment. Routing to PCP    Della Clement RN   [facility] Nurse Advisor  6:57 AM 8/3/2023    Reason for Disposition   Fever present > 3 days (72 hours)    Additional Information   Negative: Bluish (or gray) lips or face   Negative: SEVERE difficulty breathing (e.g., struggling for each breath, speaks in single words)   Negative: Rapid onset of cough and has hives   Negative: Coughing started suddenly after medicine, an allergic food or bee sting   Negative: Difficulty breathing after exposure to flames, smoke, or fumes   Negative: Sounds like a life-threatening emergency to the triager   Negative: Previous asthma attacks and this feels like asthma attack   Negative: Dry cough (non-productive; no sputum or minimal clear sputum) and within 14 days of COVID-19 Exposure   Negative: MODERATE difficulty breathing (e.g., speaks in phrases, SOB even at rest, pulse 100-120) and still present when not coughing   Negative: Chest pain present when not coughing   Negative: Passed out (i.e., fainted, collapsed and was not responding)   Negative: Patient sounds very sick or weak to the triager   Negative: MILD difficulty breathing (e.g., minimal/no SOB at rest, SOB with walking, pulse <100) and still present when not coughing   Negative: Coughed up > 1 tablespoon (15 ml) blood (Exception: Blood-tinged sputum.)   Negative: Fever > 103  F (39.4 C)   Negative: Fever > 101 F (38.3 C) and over 60 years of age   Negative: Fever > 100.0 F (37.8 C) and has diabetes mellitus or a weak immune system (e.g., HIV positive, cancer chemotherapy, organ transplant, splenectomy, chronic steroids)   Negative: Fever > 100.0 F (37.8 C) and bedridden (e.g., nursing home patient, stroke, chronic illness, recovering from surgery)   Negative: Increasing ankle swelling   Negative: Wheezing is present   Negative: SEVERE coughing spells (e.g., whooping sound after coughing, vomiting after coughing)   Negative: Coughing up evelyn-colored (reddish-brown) or blood-tinged sputum    Protocols used: Cough-A-[place]

## 2023-08-03 NOTE — TELEPHONE ENCOUNTER
I sent Tessalon perles to the pharmacy -- Seema can take 1-2 tablets up to three times daily as needed for cough.

## 2023-08-03 NOTE — TELEPHONE ENCOUNTER
Angella's father Ed is calling asking if there is anything else they can take to help manage their ongoing cough. Angella took the first dose of the Augmentin that was prescribed to treat their ear infection but Delsym and Mucinex have not been helpful with cough. Discussed supportive cares like humidifier at bedside, steamy showers, warm beverages like tea with honey. Will route to Dr. Mixon for further advice if needed.    Medications can be sent to [facility] [address]     Tevin ANDERSON, RN  08/03/23 12:21 PM

## 2023-08-03 NOTE — TELEPHONE ENCOUNTER
Called Seema's father Ed and explained how to take Tessalon appropriately. Ed has no questions at this time but will call back as needed.    Tevin ANDERSON, RN  08/03/23 12:46 PM

## 2023-08-03 NOTE — PROGRESS NOTES
"CC: Headache and Cough (Also has been having fevers off and on.)      SUBJECTIVE:   Seema Holloway is a 23 year old adult who comes in with symptoms of feverand not feeling well on Sunday (4 days ago). Stayed home from work. Had a temp of 101. Has also had a cough since then. Has been using Mucinex DM and Delsym without relief. A little bit of phlegm. No SOB at rest but feels more winded with stairs. Distance runner. No wheezing. Has a sore throat. Mild right ear pain. Endorses sinus pressure and congestion. Feeling fatigued. Mild diarrhea. No nausea or vomiting.     No known sick contacts.     Hasn't had asthma symptoms for years. Doesn't even have an inhaler at home.     Has been using acetaminophen for fever.      Daily Covid test is negative.         Allergies   Allergen Reactions    Dairy Digestive Other (See Comments)    Gluten Meal Other (See Comments)    No Known Allergies     Serotonin Reuptake Inhibitors (Ssris) [Serotonin Reuptake Inhibitors (Ssris)]      Other reaction(s): Psychosis  Manic with SSRI use       OBJECTIVE:  /77   Pulse 82   Temp 98.7  F (37.1  C)   Ht 1.803 m (5' 10.98\")   Wt 83 kg (183 lb 0.6 oz)   SpO2 96%   BMI 25.54 kg/m     Appears well. Left ear normal. Right TM erythematous and edematous. Throat and pharynx normal.  Neck supple. No adenopathy in the neck. Nose is congested and rhinorhea. Sinuses non tender. The chest is clear, without wheezes or rales. Heart is regular rate and rhythm.       ASSESSMENT:   Right otitis media    PLAN:  Augmentin BID for 10 days. I discussed with the patient the risks, benefits, and alternatives to taking this medication as well as common side effects.     Symptomatic therapy suggested: push fluids, rest, and use Tylenol as needed. Call or return to clinic prn if these symptoms worsen or fail to improve as anticipated.    "

## 2023-08-10 ENCOUNTER — TELEPHONE (OUTPATIENT)
Dept: FAMILY MEDICINE | Facility: CLINIC | Age: 23
End: 2023-08-10

## 2023-08-10 DIAGNOSIS — R05.1 ACUTE COUGH: ICD-10-CM

## 2023-08-10 RX ORDER — BENZONATATE 100 MG/1
100-200 CAPSULE ORAL 3 TIMES DAILY PRN
Qty: 30 CAPSULE | Refills: 0 | Status: SHIPPED | OUTPATIENT
Start: 2023-08-10 | End: 2024-01-16

## 2023-08-10 NOTE — TELEPHONE ENCOUNTER
Father, Ed, called regarding Angella's current illness. Angella's fever is gone, ear is feeling better, but the runny nose and coughing persists.  The cough is the most bothersome and Ed is concerned that they are nearly out of the Tessalon.  I refilled the Tessalon for Angella and recommended they use Robitussin cough syrup during the day with Ricola cough drops.  Continue antibiotic until gone as well as take Mucinex during the day to expel any trapped mucous in the sinuses and upper airway.  Discussed the cough is usually the last symptom to resolve and it can take 3-4 weeks.  ANNI TrianaN, RN, CCM  RN Care Coordinator  [facility]  08/10/23  10:58 AM  Phone: [phone]

## 2024-01-16 ENCOUNTER — OFFICE VISIT (OUTPATIENT)
Dept: FAMILY MEDICINE | Facility: CLINIC | Age: 24
End: 2024-01-16
Payer: COMMERCIAL

## 2024-01-16 VITALS
BODY MASS INDEX: 26.68 KG/M2 | HEIGHT: 72 IN | TEMPERATURE: 98.9 F | WEIGHT: 197 LBS | OXYGEN SATURATION: 97 % | HEART RATE: 82 BPM | DIASTOLIC BLOOD PRESSURE: 71 MMHG | SYSTOLIC BLOOD PRESSURE: 105 MMHG

## 2024-01-16 DIAGNOSIS — Z00.00 ANNUAL PHYSICAL EXAM: Primary | ICD-10-CM

## 2024-01-16 RX ORDER — CICLOPIROX 80 MG/ML
SOLUTION TOPICAL
COMMUNITY
Start: 2022-11-22

## 2024-01-16 RX ORDER — MUPIROCIN 20 MG/G
OINTMENT TOPICAL
COMMUNITY
Start: 2023-12-16

## 2024-01-16 ASSESSMENT — ASTHMA QUESTIONNAIRES
QUESTION_1 LAST FOUR WEEKS HOW MUCH OF THE TIME DID YOUR ASTHMA KEEP YOU FROM GETTING AS MUCH DONE AT WORK, SCHOOL OR AT HOME: NONE OF THE TIME
QUESTION_2 LAST FOUR WEEKS HOW OFTEN HAVE YOU HAD SHORTNESS OF BREATH: NOT AT ALL
QUESTION_3 LAST FOUR WEEKS HOW OFTEN DID YOUR ASTHMA SYMPTOMS (WHEEZING, COUGHING, SHORTNESS OF BREATH, CHEST TIGHTNESS OR PAIN) WAKE YOU UP AT NIGHT OR EARLIER THAN USUAL IN THE MORNING: NOT AT ALL
ACT_TOTALSCORE: 25
QUESTION_5 LAST FOUR WEEKS HOW WOULD YOU RATE YOUR ASTHMA CONTROL: COMPLETELY CONTROLLED
ACT_TOTALSCORE: 25
QUESTION_4 LAST FOUR WEEKS HOW OFTEN HAVE YOU USED YOUR RESCUE INHALER OR NEBULIZER MEDICATION (SUCH AS ALBUTEROL): NOT AT ALL

## 2024-01-16 ASSESSMENT — ENCOUNTER SYMPTOMS
FEVER: 0
FREQUENCY: 0
HEADACHES: 1
CHILLS: 0
NAUSEA: 0
WEAKNESS: 0
JOINT SWELLING: 0
COUGH: 0
ARTHRALGIAS: 0
HEARTBURN: 0
DYSURIA: 0
MYALGIAS: 1
CONSTIPATION: 1
DIZZINESS: 0
ABDOMINAL PAIN: 1
SHORTNESS OF BREATH: 0
EYE PAIN: 0
HEMATURIA: 0
PARESTHESIAS: 0
NERVOUS/ANXIOUS: 1
SORE THROAT: 0
DIARRHEA: 0
PALPITATIONS: 0
HEMATOCHEZIA: 0

## 2024-01-16 NOTE — NURSING NOTE
"Seema  23 year old    Chief Complaint   Patient presents with    Physical    Forms     Group home forms            Blood pressure 105/71, pulse 82, temperature 98.9  F (37.2  C), temperature source Skin, height 1.827 m (5' 11.93\"), weight 89.4 kg (197 lb), SpO2 97%. Body mass index is 26.77 kg/m .    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)    Gluten-sensitive enteropathy    Gluten intolerance    Seasonal allergic rhinitis    Mild intermittent asthma without complication    Gender dysphoria, unspecified    Bipolar 1 disorder (H)    Low serum iron              Wt Readings from Last 2 Encounters:   01/16/24 89.4 kg (197 lb)   08/03/23 83 kg (183 lb 0.6 oz)       BP Readings from Last 3 Encounters:   01/16/24 105/71   08/03/23 120/77   10/17/22 128/77                Current Outpatient Medications   Medication    acetylcysteine (N-ACETYL CYSTEINE) 600 MG CAPS capsule    ciclopirox (PENLAC) 8 % external solution    clonazePAM (KLONOPIN) 1 MG tablet    divalproex sodium extended-release (DEPAKOTE ER) 500 MG 24 hr tablet    lithium (ESKALITH CR/LITHOBID) 450 MG CR tablet    loratadine 10 MG capsule    lurasidone (LATUDA) 80 MG TABS tablet    melatonin 3 MG tablet    metroNIDAZOLE (METROCREAM) 0.75 % external cream    mupirocin (BACTROBAN) 2 % external ointment    OLANZapine (ZYPREXA) 5 MG tablet    benzonatate (TESSALON) 100 MG capsule    clobetasol (TEMOVATE) 0.05 % external ointment    omeprazole (PRILOSEC) 40 MG DR capsule    QUEtiapine (SEROQUEL) 50 MG " "tablet     No current facility-administered medications for this visit.              Social History     Tobacco Use    Smoking status: Never    Smokeless tobacco: Never   Substance Use Topics    Alcohol use: No    Drug use: No              Health Maintenance Due   Topic Date Due    ADVANCE CARE PLANNING  Never done    HEPATITIS C SCREENING  Never done    MENTAL HEALTH TX PLAN  12/25/2019    YEARLY PREVENTIVE VISIT  01/24/2020    ASTHMA ACTION PLAN  12/03/2020    INFLUENZA VACCINE (1) 09/01/2023    COVID-19 Vaccine (5 - 2023-24 season) 09/01/2023            No results found for: \"PAP\"           January 16, 2024 8:39 AM    "

## 2024-01-16 NOTE — PROGRESS NOTES
Remy Holloway presents to [facility] today to have an annual exam    IMPRESSION  24 yo transgender female with Autism and some other mental health concerns.   Doing well. In need of a physical for moving into a group home.     ASSESSMENT/PLAN:    Medication list was confirmed with several medications removed.   Form filled out as needed.     -Follow up: as needed with myself or with Dr. Ramin Cuevas MD  Family Medicine and Sports Medicine    Introduction: Seema is a 24 yo transgender female who uses she/her or they/them pronouns.   She is here as she's about to move into a group home in [place], near the [facility].   Needs a physical exam done prior to entry.   Accompanied to today's visit by her father, Ed.     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       Current Medications include:   Current Outpatient Medications   Medication Sig Dispense Refill    acetylcysteine (N-ACETYL CYSTEINE) 600 MG CAPS capsule Take two po bid 60 capsule 3    ciclopirox (PENLAC) 8 % external solution APPLY TO THE AFFECTED AREA(S) BY TOPICAL ROUTE ONCE DAILY PREFERABLY AT BEDTIME OR 8 HOURS BEFORE WASHING      clonazePAM (KLONOPIN) 1 MG  tablet 1 mg At Bedtime Take 0.5 -1 tablet in the morning, and may repeat one additional dose q day prn 90 tablet 1    divalproex sodium extended-release (DEPAKOTE ER) 500 MG 24 hr tablet Take 2,000 mg by mouth at bedtime      lithium (ESKALITH CR/LITHOBID) 450 MG CR tablet 450 mg in AM and 900 mg in HS 90 tablet 0    loratadine 10 MG capsule Take 10 mg by mouth daily 30 capsule 3    lurasidone (LATUDA) 80 MG TABS tablet Take 160 mg by mouth daily 2 tabs at bedtime      melatonin 3 MG tablet 3 mg Take 2-3 tablets at night 30 tablet 0    metroNIDAZOLE (METROCREAM) 0.75 % external cream Apply topically 2 times daily      mupirocin (BACTROBAN) 2 % external ointment APPLY TO AFFECTED AREA 3 TIMES A DAY FOR 7 DAYS       Allergies   Allergen Reactions    Dairy Digestive Other (See Comments)    Gluten Meal Other (See Comments)    No Known Allergies     Serotonin Reuptake Inhibitors (Ssris) [Serotonin Reuptake Inhibitors (Ssris)]      Other reaction(s): Psychosis  Manic with SSRI use         Social  Social History     Social History Narrative    From [place].     Has a brother, Freddy.         Family Information:    Parent #1    Name: SARA Mcdonough, 12-2-63  Education:  Post grad Masters  Occupation:     Parent #2    Name: AAMIR Verma, 1-16-64  Education: Post Grad  Occupation:          [employer]  PHQ-2 Score:         1/16/2024     8:28 AM 11/23/2021     9:39 AM   PHQ-2 ( 1999 Pfizer)   Q1: Little interest or pleasure in doing things 1 2   Q2: Feeling down, depressed or hopeless 1 2   PHQ-2 Score 2 4   Q1: Little interest or pleasure in doing things Several days    Q2: Feeling down, depressed or hopeless Several days    PHQ-2 Score 2    Answers submitted by the patient for this visit:  Annual Preventive Visit (Submitted on 1/16/2024)  Chief Complaint: Annual Exam:  Frequency of exercise:: 2-3 days/week  Getting at least 3 servings of Calcium per day:: NO  Diet:: Regular (no  restrictions)  Taking medications regularly:: Yes  Medication side effects:: Other  Bi-annual eye exam:: NO  Dental care twice a year:: Yes  Sleep apnea or symptoms of sleep apnea:: None  abdominal pain: Yes  Blood in stool: No  Blood in urine: No  chest pain: No  chills: No  congestion: Yes  constipation: Yes  cough: No  diarrhea: No  dizziness: No  ear pain: No  eye pain: No  nervous/anxious: Yes  fever: No  frequency: No  genital sores: No  headaches: Yes  hearing loss: No  heartburn: No  arthralgias: No  joint swelling: No  peripheral edema: No  mood changes: No  myalgias: Yes  nausea: No  dysuria: No  palpitations: No  Skin sensation changes: No  sore throat: No  urgency: No  rash: No  shortness of breath: No  visual disturbance: No  weakness: No  impotence: No  penile discharge: No  Additional concerns today:: Yes  Exercise outside of work (Submitted on 1/16/2024)  Chief Complaint: Annual Exam:  Duration of exercise:: 15-30 minutes          Lifestyle and other preventive health:       Her physical activity -Lots of walking. Plays floor hockey.   Her diet - Healthy, but a little too much.   Her sleep is good. 8 hours/night  Alcohol intake = None  She does not use use tobacco products.       Her last dental check up was about 3 months ago.   +    Health Maintenance   Topic Date Due    ADVANCE CARE PLANNING  Never done    HEPATITIS C SCREENING  Never done    MENTAL HEALTH TX PLAN  12/25/2019    YEARLY PREVENTIVE VISIT  01/24/2020    ASTHMA ACTION PLAN  12/03/2020    INFLUENZA VACCINE (1) 09/01/2023    COVID-19 Vaccine (5 - 2023-24 season) 09/01/2023    ASTHMA CONTROL TEST  07/16/2024    DTAP/TDAP/TD IMMUNIZATION (8 - Td or Tdap) 05/17/2031    HIV SCREENING  Completed    PHQ-2 (once per calendar year)  Completed    IPV IMMUNIZATION  Completed    HPV IMMUNIZATION  Completed    MENINGITIS IMMUNIZATION  Completed    HEPATITIS B IMMUNIZATION  Completed    Pneumococcal Vaccine: Pediatrics (0 to 5 Years) and At-Risk  "Patients (6 to 64 Years)  Aged Out    RSV MONOCLONAL ANTIBODY  Aged Out           Past Surgical History:   Procedure Laterality Date    DENTAL SURGERY      wisdom teeth removed    ESOPHAGOSCOPY, GASTROSCOPY, DUODENOSCOPY (EGD), COMBINED N/A 10/17/2022    Procedure: ESOPHAGOGASTRODUODENOSCOPY, WITH BIOPSY;  Surgeon: Esa Morgan MD;  Location: [facility] OR    HC REPAIR, INCOMPLETE CIRCUMCISION  2000    Recircumcised    HERNIA REPAIR, UMBILICAL  2000    repair umbilical hernia    ZZHC CREATE EARDRUM OPENING,GEN ANESTH  8/2000    P.E. Tubes - removed 2002       Family History   Problem Relation Age of Onset    Migraines Mother     Breast Cancer Natural parent         Mother    Depression Paternal Grandfather        Immunizations are as follows:      Immunization History   Administered Date(s) Administered    COVID-19 Bivalent 18+ (Moderna) 09/12/2022    COVID-19 Monovalent 18+ (Moderna) 03/19/2021, 04/16/2021, 11/12/2021    Comvax (HIB/HepB) 1999, 1999, 02/16/2000    DTAP (<7y) 1999, 1999, 1999, 06/09/2000, 03/02/2004    HEPA 02/14/2007, 12/14/2007    HPV 07/24/2012, 11/20/2013, 08/13/2014    Influenza (H1N1) 01/24/2010    Influenza (IIV3) PF 02/14/2007    Influenza Intranasal Vaccine 11/15/2007, 10/07/2008    Influenza Vaccine 18-64 (Flublok) 12/03/2019    Influenza Vaccine >6 months,quad, PF 10/04/2018    MMR 06/09/2000, 03/08/2001, 03/02/2004    Meningococcal ACWY (Menactra ) 04/12/2011, 04/19/2018    Nasal Influenza Vaccine 2-49 (FluMist) 11/20/2013    OPV, trivalent, live 1999    Poliovirus, inactivated (IPV) 1999, 1999, 03/02/2004    TDAP (Adacel,Boostrix) 05/17/2021    TDAP Vaccine (Boostrix) 04/12/2011    Varicella 02/16/2000, 02/14/2007    Yellow Fever 01/19/2010       EXAM    Vitals: /71 (BP Location: Left arm, Patient Position: Sitting, Cuff Size: Adult Large)   Pulse 82   Temp 98.9  F (37.2  C) (Skin)   Ht 1.827 m (5' 11.93\")   Wt 89.4 kg " (197 lb)   SpO2 97%   BMI 26.77 kg/m    BMI= Body mass index is 26.77 kg/m .    GENERAL APPEARANCE: Appears well.  HEENT: neck is supple. No adenopathy, thyroid normal to palpation  RESP: Lungs clear to auscultation bilaterally.  Axillae: no palpable axillary masses or adenopathy  CV: regular rate and rhythm, normal S1 S2, no murmur, no carotid bruits  ABDOMEN: soft, nontender, without HSM or masses. Bowel sounds normal  : Deferred. Reported no concerns. Has not had any gender transitioning surgeries or taken any hormones.   Rectal exam: deferred  MS: Gait - normal. Extremities with generally normal range of motion.  SKIN: No suspicious lesions or rashes  NEURO: Normal strength and tone, sensory exam grossly normal  PSYCH: mentation and affect appear normal.   EXT: no peripheral edema        SEE TOP OF NOTE FOR ASSESSMENT AND PLAN      Amador Cuevas MD  Family Medicine and Sports Medicine  [facility] Department of Family Medicine and [facility]

## 2024-01-18 ENCOUNTER — TELEPHONE (OUTPATIENT)
Dept: FAMILY MEDICINE | Facility: CLINIC | Age: 24
End: 2024-01-18

## 2024-01-18 NOTE — TELEPHONE ENCOUNTER
Med list updated and signed. Faxed to group home and left copy for father to  at .    Tevin ANDERSON, RN  01/18/24 9:23 AM

## 2024-01-18 NOTE — TELEPHONE ENCOUNTER
Father called because her forgot to have Dr. Cuevas add metformin 500 mg twice a day with meals to Seema's signed med list.    Please fax signed med list to [phone] and have original copy for  call Dad [phone].    Lanie

## 2024-03-22 ENCOUNTER — MYC MEDICAL ADVICE (OUTPATIENT)
Dept: FAMILY MEDICINE | Facility: CLINIC | Age: 24
End: 2024-03-22

## 2024-03-25 NOTE — TELEPHONE ENCOUNTER
[employer] form sent by mother, Daisha, completed and placed on Dr. Cuevas desk to sign.  Once completed, it will come back to me.  I am awaiting a response from Daisha as to how she would like the form returned to her.  Oanh Burk, BSN, RN, [facility]  RN Care Coordinator  [facility]  03/25/24  3:09 PM  Phone: [phone]

## 2024-03-26 NOTE — TELEPHONE ENCOUNTER
Form signed by Dr. Cuevas and and mother, Daisha, requested it be emailed to her at [email or web address], which was done 3/26/24 @ 10:45 am.  ANNI rTianaN, RN, [facility]  RN Care Coordinator  [facility]  03/26/24  10:47 AM  Phone: [phone]

## 2024-05-09 NOTE — PROGRESS NOTES
Seema Holloway is a 24 year old transgender female with hx of ADHD, Autism, anxiety, bipolar 1 disorder, constipation, exercise induced asthma, seasonal allergies, gluten intolerance. She is here with parents to discuss the following issues:    Preferred pronouns she/her, they, them    Weight gain  Notes weight gain since moving to a group home ([facility]) in January 2024  Weight unchanged on scale but clothes fitting differently  Previous concerted weight loss of 20-25 lb in 2022 by eliminating dairy and gluten.   Had met with Dr. Mayo (endocrine)  Strength training, starting to run  Special olympics, basketball, hockey, exercises 1x per week  Used to walk daily but since moving to new group home, unfamiliar with neighborhood.   Reports most housemates do not like veggies, he can request them but generally not offered.   Fresh fruit usually available but he tends to eat less of that  Eats cereal for breakfast, sweetened cereal Captain Crunch offered.  He would prefer to eat a vegetable, protein such as sausage    Acne  Seeing dermatologist  Doxycycline 50mg bid recommended  Topical cleocin and metronidazole if needed  Adapalene at bedtime.     Chronic alternating constipation/ diarrhea  Longstanding issue  Recently started Probiotic daily   sometime forgets, not sure if helping  No current fiber supplement      Wt Readings from Last 4 Encounters:   05/10/24 90 kg (198 lb 8 oz)   01/16/24 89.4 kg (197 lb)   08/03/23 83 kg (183 lb 0.6 oz)   10/17/22 86.2 kg (190 lb)     Body mass index is 26.97 kg/m .      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)    Gluten-sensitive enteropathy    Gluten intolerance    Seasonal allergic rhinitis    Mild intermittent asthma without complication    Gender dysphoria, unspecified    Bipolar 1 disorder (H)    Low serum iron       Current Outpatient Medications   Medication Sig Dispense Refill    acetylcysteine (N-ACETYL CYSTEINE) 600 MG CAPS capsule Take two po bid 60 capsule 3    ciclopirox (PENLAC) 8 % external solution APPLY TO THE AFFECTED AREA(S) BY TOPICAL ROUTE ONCE DAILY PREFERABLY AT BEDTIME OR 8 HOURS BEFORE WASHING      clonazePAM (KLONOPIN) 1 MG tablet 1 mg At Bedtime Take 0.5 -1 tablet in the morning, and may repeat one additional dose q day prn 90 tablet 1    divalproex sodium extended-release (DEPAKOTE ER) 500 MG 24 hr tablet Take 2,000 mg by mouth at bedtime      lithium (ESKALITH CR/LITHOBID) 450 MG CR tablet 450 mg in AM and 900 mg in HS 90 tablet 0    loratadine 10 MG capsule Take 10 mg by mouth daily 30 capsule 3    lurasidone (LATUDA) 80 MG TABS tablet Take 160 mg by mouth daily 2 tabs at bedtime      melatonin 3 MG tablet 3 mg Take 2-3 tablets at night 30 tablet 0    metFORMIN (GLUCOPHAGE) 500 MG tablet Take 1 tablet (500 mg) by mouth 2 times daily (with meals)      metroNIDAZOLE (METROCREAM) 0.75 % external cream Apply topically 2 times daily      mupirocin (BACTROBAN) 2 % external ointment APPLY TO AFFECTED AREA 3 TIMES A DAY FOR 7 DAYS         Allergies   Allergen Reactions    Dairy Digestive Other (See Comments)    Gluten Meal Other (See Comments)    No Known Allergies     Serotonin Reuptake Inhibitors (Ssris) [Serotonin Reuptake Inhibitors (Ssris)]      Other reaction(s): Psychosis  Manic with SSRI use        EXAM  /89 (BP Location: Left arm, Patient Position: Sitting, Cuff Size: Adult Regular)   Pulse 89   Temp 98.1  F (36.7  C) (Skin)   Wt 90 kg (198 lb 8 oz)   SpO2 92%   BMI 26.97 kg/m     Gen: Alert, pleasant, NAD  COR: S1,S2, no murmur  Lungs: CTA bilaterally, no rhonchi, wheezes or rales  EXT No edema      Assessment:  (R63.5) Weight gain  (primary encounter diagnosis)  Comment: weight is unchanged since Jan 2024 but clothes fitting differently, moved in January to group home, some fruit offered but he prefers veggies, and not much offered  Plan: recommend asking if group home has nutritionist. Offer vegetable with every meal with and with snack time, including packed lunch. Recommend they offer a protein choice at breakfast, sausage, egg, milk, yoghurt, cheese, peanut butter  Also recommend 30-60 min daily of exercise with staff from group home..    Paper work completed for Seema today, will be scanned to chart.     (R19.8) Alternating constipation and diarrhea  Comment: longstanding issue  Plan: recommend adding benefiber daily    (L70.9) Acne, unspecified acne type  Comment: on doxycycline, topical clinda and metronidazole  Plan: discussed use of sunscreen as doxy will trigger photosensitivity  May use neutrogena sunscreen without concern for clogged pores.     34 minutes spend on the date of this encounter doing chart review, history and exam, documentation and further activities as noted above.       RTC 6 mos    Lana Faustin MD  Internal Medicine/Pediatrics

## 2024-05-10 ENCOUNTER — OFFICE VISIT (OUTPATIENT)
Dept: FAMILY MEDICINE | Facility: CLINIC | Age: 24
End: 2024-05-10
Payer: COMMERCIAL

## 2024-05-10 VITALS
TEMPERATURE: 98.1 F | WEIGHT: 198.5 LBS | HEART RATE: 89 BPM | BODY MASS INDEX: 26.97 KG/M2 | DIASTOLIC BLOOD PRESSURE: 89 MMHG | OXYGEN SATURATION: 92 % | SYSTOLIC BLOOD PRESSURE: 131 MMHG

## 2024-05-10 DIAGNOSIS — R19.8 ALTERNATING CONSTIPATION AND DIARRHEA: ICD-10-CM

## 2024-05-10 DIAGNOSIS — R63.5 WEIGHT GAIN: Primary | ICD-10-CM

## 2024-05-10 DIAGNOSIS — L70.9 ACNE, UNSPECIFIED ACNE TYPE: ICD-10-CM

## 2024-05-10 RX ORDER — DOXYCYCLINE 50 MG/1
50 CAPSULE ORAL 2 TIMES DAILY
COMMUNITY
Start: 2024-04-22

## 2024-05-10 RX ORDER — OLANZAPINE 5 MG/1
5-10 TABLET ORAL
COMMUNITY
Start: 2024-03-12

## 2024-05-10 RX ORDER — CLOBETASOL PROPIONATE 0.5 MG/G
OINTMENT TOPICAL
COMMUNITY
Start: 2024-04-22

## 2024-05-10 RX ORDER — CLINDAMYCIN PHOSPHATE 10 MG/G
GEL TOPICAL 2 TIMES DAILY
COMMUNITY
Start: 2024-04-22

## 2024-05-10 RX ORDER — DIVALPROEX SODIUM 250 MG/1
250 TABLET, DELAYED RELEASE ORAL DAILY
COMMUNITY

## 2024-05-10 RX ORDER — ADAPALENE GEL USP, 0.3% 3 MG/G
GEL TOPICAL AT BEDTIME
COMMUNITY
Start: 2024-04-22

## 2024-05-10 RX ORDER — NIACINAMIDE 500 MG
220 TABLET, EXTENDED RELEASE ORAL 2 TIMES DAILY
COMMUNITY

## 2024-05-10 NOTE — NURSING NOTE
24 year old  Chief Complaint   Patient presents with    Weight Check     Discuss weight gain       Blood pressure 131/89, pulse 89, temperature 98.1  F (36.7  C), temperature source Skin, weight 90 kg (198 lb 8 oz), SpO2 92%. Body mass index is 26.97 kg/m .  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)    Gluten-sensitive enteropathy    Gluten intolerance    Seasonal allergic rhinitis    Mild intermittent asthma without complication    Gender dysphoria, unspecified    Bipolar 1 disorder (H)    Low serum iron       Wt Readings from Last 2 Encounters:   05/10/24 90 kg (198 lb 8 oz)   01/16/24 89.4 kg (197 lb)     BP Readings from Last 3 Encounters:   05/10/24 131/89   01/16/24 105/71   08/03/23 120/77         Current Outpatient Medications   Medication Sig Dispense Refill    acetylcysteine (N-ACETYL CYSTEINE) 600 MG CAPS capsule Take two po bid 60 capsule 3    adapalene (DIFFERIN) 0.3 % external gel       ciclopirox (PENLAC) 8 % external solution APPLY TO THE AFFECTED AREA(S) BY TOPICAL ROUTE ONCE DAILY PREFERABLY AT BEDTIME OR 8 HOURS BEFORE WASHING      clindamycin (CLEOCIN-T) 1 % external gel       clobetasol (TEMOVATE) 0.05 % external ointment       clonazePAM (KLONOPIN) 1 MG tablet 1 mg At Bedtime Take 0.5 -1 tablet in the morning, and may repeat one additional dose q day prn 90 tablet 1    Digestive Enzymes CAPS Take 220 mg by mouth 2 times daily      divalproex sodium delayed-release (DEPAKOTE) 250 MG DR tablet Take 250 mg by mouth daily       "divalproex sodium extended-release (DEPAKOTE ER) 500 MG 24 hr tablet Take 2,000 mg by mouth at bedtime      doxycycline monohydrate (MONODOX) 50 MG capsule       lithium (ESKALITH CR/LITHOBID) 450 MG CR tablet 450 mg in AM and 900 mg in HS 90 tablet 0    loratadine 10 MG capsule Take 10 mg by mouth daily 30 capsule 3    lurasidone (LATUDA) 80 MG TABS tablet Take 160 mg by mouth daily 2 tabs at bedtime      melatonin 3 MG tablet 3 mg Take 2-3 tablets at night 30 tablet 0    metFORMIN (GLUCOPHAGE) 500 MG tablet Take 1 tablet (500 mg) by mouth 2 times daily (with meals)      metroNIDAZOLE (METROCREAM) 0.75 % external cream Apply topically 2 times daily      OLANZapine (ZYPREXA) 5 MG tablet Take 5-10 mg by mouth      mupirocin (BACTROBAN) 2 % external ointment APPLY TO AFFECTED AREA 3 TIMES A DAY FOR 7 DAYS (Patient not taking: Reported on 5/10/2024)       No current facility-administered medications for this visit.       Social History     Tobacco Use    Smoking status: Never    Smokeless tobacco: Never   Substance Use Topics    Alcohol use: No    Drug use: No       Health Maintenance Due   Topic Date Due    ADVANCE CARE PLANNING  Never done    Pneumococcal Vaccine: Pediatrics (0 to 5 Years) and At-Risk Patients (6 to 64 Years) (1 of 2 - PCV) Never done    HEPATITIS C SCREENING  Never done    MENTAL HEALTH TX PLAN  12/25/2019    ASTHMA ACTION PLAN  12/03/2020    COVID-19 Vaccine (5 - 2023-24 season) 09/01/2023       No results found for: \"PAP\"      May 10, 2024 9:02 AM   "

## 2024-05-16 NOTE — PATIENT INSTRUCTIONS
Omeprazole 40mg dose  Take once each morning.    Monitor how much water and how caffeine you are drinking every day    Then cut caffeine amount in half and drink only decaf coffee      I will send  Dermatology clinic numbers  [facility]   [address]   [address]rd [address]   [place] [address]   Phone: [phone]     LISA Dermatologist (check with insurance re coverage)  [facility]  [address], [address]  Phone  [phone]    Dermatology specialists  [address] [place] [address]. [address]  Phone [phone]   Patient Specific Counseling (Will Not Stick From Patient To Patient): sarna lotion Detail Level: Zone

## 2025-02-23 ENCOUNTER — HEALTH MAINTENANCE LETTER (OUTPATIENT)
Age: 25
End: 2025-02-23

## 2025-03-09 ENCOUNTER — DOCUMENTATION ONLY (OUTPATIENT)
Dept: OTHER | Facility: CLINIC | Age: 25
End: 2025-03-09
Payer: COMMERCIAL

## 2025-06-24 NOTE — GROUP NOTE
Life Skills/OT Group Note    PATIENT'S NAME: Remy Holloway  MRN:   [id number]  :   2000  ACCT. NUMBER: [id number]  DATE OF SERVICE: 19  START TIME: 10:00 AM  END TIME: 10:50 AM  FACILITATOR: Bryan Gerber OT  TOPIC:  Life Skills Group: Sensory Approaches in Mental Health  Adult Mental Health Day Treatment  TRACK: 2A    NUMBER OF PARTICIPANTS: 6    Summary of Group / Topics Discussed:  Sensory Approaches in Mental Health:  Sensory Enhanced Mindfulness Proprioception and grounding: Patients were taught and provided with an opportunity to explore and practice how using sensory enhanced mindfulness practices can help them stay grounded in the present moment as a way to manage mental health symptoms and stressors.         Patient Session Goals / Objectives:    Identified how using sensory enhanced mindfulness practices can be used for grounding, stress management, and self regulation      Improved awareness of different types of sensory enhanced mindfulness activities that assist with healthy coping of stress and symptoms      Established a plan for practice of these skills in their own environments    Practiced and reflected on how to generalize taught skills to their everyday life        Patient Participation / Response:  Moderately participated, sharing some personal reflections / insights and adequately adequately received / provided feedback with other participants.    Patient presentation: continues with intermittent engagement. At times very engaged at others is distracted. , Verbalized understanding of content and Patient would benefit from additional opportunities to practice the content to be able to generalize it to their everyday life with increased intentionality, consistency, and efficacy in support of their psychiatric recovery    Treatment Plan:  Patient has a current master individualized treatment plan.  See Epic treatment plan for more information.    Bryan Gerber OT   SCD's

## (undated) DEVICE — SOL WATER IRRIG 500ML BOTTLE 2F7113

## (undated) DEVICE — ENDO BITE BLOCK ADULT OMNI-BLOC

## (undated) DEVICE — SUCTION MANIFOLD NEPTUNE 2 SYS 1 PORT 702-025-000

## (undated) DEVICE — SPECIMEN CONTAINER 3OZ W/FORMALIN 59901

## (undated) DEVICE — SUCTION CATH AIRLIFE TRI-FLO W/CONTROL PORT 14FR  T60C

## (undated) DEVICE — SYR 30ML SLIP TIP W/O NDL 302833

## (undated) DEVICE — GOWN IMPERVIOUS 2XL BLUE

## (undated) DEVICE — GLOVE EXAM NITRILE LG PF LATEX FREE 5064

## (undated) DEVICE — TUBING SUCTION 12"X1/4" N612

## (undated) DEVICE — ENDO FORCEP BX CAPTURA PRO SPIKE G50696

## (undated) DEVICE — KIT ENDO TURNOVER/PROCEDURE CARRY-ON 101822